# Patient Record
Sex: MALE | Race: WHITE | NOT HISPANIC OR LATINO | Employment: PART TIME | URBAN - METROPOLITAN AREA
[De-identification: names, ages, dates, MRNs, and addresses within clinical notes are randomized per-mention and may not be internally consistent; named-entity substitution may affect disease eponyms.]

---

## 2017-01-18 ENCOUNTER — APPOINTMENT (OUTPATIENT)
Dept: LAB | Facility: HOSPITAL | Age: 67
End: 2017-01-18
Attending: INTERNAL MEDICINE
Payer: COMMERCIAL

## 2017-01-18 DIAGNOSIS — R73.01 IMPAIRED FASTING GLUCOSE: ICD-10-CM

## 2017-01-18 LAB
ANION GAP SERPL CALCULATED.3IONS-SCNC: 7 MMOL/L (ref 4–13)
BUN SERPL-MCNC: 21 MG/DL (ref 5–25)
CALCIUM SERPL-MCNC: 9.3 MG/DL (ref 8.3–10.1)
CHLORIDE SERPL-SCNC: 101 MMOL/L (ref 100–108)
CO2 SERPL-SCNC: 30 MMOL/L (ref 21–32)
CREAT SERPL-MCNC: 0.91 MG/DL (ref 0.6–1.3)
GFR SERPL CREATININE-BSD FRML MDRD: >60 ML/MIN/1.73SQ M
GLUCOSE SERPL-MCNC: 128 MG/DL (ref 65–140)
POTASSIUM SERPL-SCNC: 4.1 MMOL/L (ref 3.5–5.3)
SODIUM SERPL-SCNC: 138 MMOL/L (ref 136–145)

## 2017-01-18 PROCEDURE — 36415 COLL VENOUS BLD VENIPUNCTURE: CPT

## 2017-01-18 PROCEDURE — 80048 BASIC METABOLIC PNL TOTAL CA: CPT

## 2017-01-20 ENCOUNTER — ALLSCRIPTS OFFICE VISIT (OUTPATIENT)
Dept: OTHER | Facility: OTHER | Age: 67
End: 2017-01-20

## 2017-02-07 ENCOUNTER — GENERIC CONVERSION - ENCOUNTER (OUTPATIENT)
Dept: OTHER | Facility: OTHER | Age: 67
End: 2017-02-07

## 2017-06-15 ENCOUNTER — GENERIC CONVERSION - ENCOUNTER (OUTPATIENT)
Dept: OTHER | Facility: OTHER | Age: 67
End: 2017-06-15

## 2017-06-20 ENCOUNTER — TRANSCRIBE ORDERS (OUTPATIENT)
Dept: ADMINISTRATIVE | Facility: HOSPITAL | Age: 67
End: 2017-06-20

## 2017-06-20 ENCOUNTER — APPOINTMENT (OUTPATIENT)
Dept: LAB | Facility: HOSPITAL | Age: 67
End: 2017-06-20
Payer: COMMERCIAL

## 2017-06-20 ENCOUNTER — APPOINTMENT (OUTPATIENT)
Dept: LAB | Facility: HOSPITAL | Age: 67
End: 2017-06-20
Attending: ORTHOPAEDIC SURGERY
Payer: COMMERCIAL

## 2017-06-20 ENCOUNTER — HOSPITAL ENCOUNTER (OUTPATIENT)
Dept: NON INVASIVE DIAGNOSTICS | Facility: HOSPITAL | Age: 67
Discharge: HOME | End: 2017-06-20
Attending: ORTHOPAEDIC SURGERY
Payer: COMMERCIAL

## 2017-06-20 ENCOUNTER — HOSPITAL ENCOUNTER (OUTPATIENT)
Dept: RADIOLOGY | Facility: HOSPITAL | Age: 67
Discharge: HOME | End: 2017-06-20
Attending: ORTHOPAEDIC SURGERY
Payer: COMMERCIAL

## 2017-06-20 ENCOUNTER — ANESTHESIA EVENT (OUTPATIENT)
Dept: PERIOP | Facility: HOSPITAL | Age: 67
DRG: 470 | End: 2017-06-20
Payer: COMMERCIAL

## 2017-06-20 ENCOUNTER — APPOINTMENT (OUTPATIENT)
Dept: PREADMISSION TESTING | Facility: HOSPITAL | Age: 67
End: 2017-06-20
Payer: COMMERCIAL

## 2017-06-20 VITALS
TEMPERATURE: 97 F | SYSTOLIC BLOOD PRESSURE: 160 MMHG | RESPIRATION RATE: 18 BRPM | BODY MASS INDEX: 34.66 KG/M2 | WEIGHT: 234 LBS | HEART RATE: 65 BPM | DIASTOLIC BLOOD PRESSURE: 80 MMHG | HEIGHT: 69 IN

## 2017-06-20 DIAGNOSIS — M17.12 OSTEOARTHRITIS OF LEFT KNEE, UNSPECIFIED OSTEOARTHRITIS TYPE: ICD-10-CM

## 2017-06-20 DIAGNOSIS — Z01.818 PREOP EXAMINATION: Primary | ICD-10-CM

## 2017-06-20 DIAGNOSIS — Z00.8 HEALTH EXAMINATION IN POPULATION SURVEY: ICD-10-CM

## 2017-06-20 DIAGNOSIS — Z01.818 PREOP EXAMINATION: ICD-10-CM

## 2017-06-20 DIAGNOSIS — Z00.8 HEALTH EXAMINATION IN POPULATION SURVEY: Primary | ICD-10-CM

## 2017-06-20 LAB
ALBUMIN SERPL BCP-MCNC: 4.2 G/DL (ref 3.5–5)
ALP SERPL-CCNC: 100 U/L (ref 46–116)
ALT SERPL W P-5'-P-CCNC: 29 U/L (ref 12–78)
ANION GAP SERPL CALCULATED.3IONS-SCNC: 9 MMOL/L (ref 4–13)
AST SERPL W P-5'-P-CCNC: 13 U/L (ref 5–45)
BASOPHILS # BLD AUTO: 0.03 THOUSANDS/ΜL (ref 0–0.1)
BASOPHILS NFR BLD AUTO: 1 % (ref 0–1)
BILIRUB SERPL-MCNC: 0.77 MG/DL (ref 0.2–1)
BILIRUB UR QL STRIP: NEGATIVE
BUN SERPL-MCNC: 22 MG/DL (ref 5–25)
CALCIUM SERPL-MCNC: 9.5 MG/DL (ref 8.3–10.1)
CHLORIDE SERPL-SCNC: 98 MMOL/L (ref 100–108)
CHOLEST SERPL-MCNC: 182 MG/DL (ref 50–200)
CLARITY UR: CLEAR
CO2 SERPL-SCNC: 28 MMOL/L (ref 21–32)
COLOR UR: YELLOW
CREAT SERPL-MCNC: 1.05 MG/DL (ref 0.6–1.3)
EOSINOPHIL # BLD AUTO: 0.08 THOUSAND/ΜL (ref 0–0.61)
EOSINOPHIL NFR BLD AUTO: 1 % (ref 0–6)
ERYTHROCYTE [DISTWIDTH] IN BLOOD BY AUTOMATED COUNT: 12.5 % (ref 11.6–15.1)
EST. AVERAGE GLUCOSE BLD GHB EST-MCNC: 131 MG/DL
GFR SERPL CREATININE-BSD FRML MDRD: >60 ML/MIN/1.73SQ M
GLUCOSE P FAST SERPL-MCNC: 132 MG/DL (ref 65–99)
GLUCOSE UR STRIP-MCNC: NEGATIVE MG/DL
HBA1C MFR BLD: 6.2 % (ref 4.2–6.3)
HCT VFR BLD AUTO: 40.7 % (ref 36.5–49.3)
HDLC SERPL-MCNC: 24 MG/DL (ref 40–60)
HGB BLD-MCNC: 14.2 G/DL (ref 12–17)
HGB UR QL STRIP.AUTO: NEGATIVE
INR PPP: 1.01 (ref 0.86–1.16)
KETONES UR STRIP-MCNC: NEGATIVE MG/DL
LEUKOCYTE ESTERASE UR QL STRIP: NEGATIVE
LYMPHOCYTES # BLD AUTO: 2.1 THOUSANDS/ΜL (ref 0.6–4.47)
LYMPHOCYTES NFR BLD AUTO: 32 % (ref 14–44)
MCH RBC QN AUTO: 32.1 PG (ref 26.8–34.3)
MCHC RBC AUTO-ENTMCNC: 34.9 G/DL (ref 31.4–37.4)
MCV RBC AUTO: 92 FL (ref 82–98)
MONOCYTES # BLD AUTO: 0.77 THOUSAND/ΜL (ref 0.17–1.22)
MONOCYTES NFR BLD AUTO: 12 % (ref 4–12)
NEUTROPHILS # BLD AUTO: 3.57 THOUSANDS/ΜL (ref 1.85–7.62)
NEUTS SEG NFR BLD AUTO: 54 % (ref 43–75)
NITRITE UR QL STRIP: NEGATIVE
PH UR STRIP.AUTO: 5.5 [PH] (ref 4.5–8)
PLATELET # BLD AUTO: 290 THOUSANDS/UL (ref 149–390)
PMV BLD AUTO: 11.4 FL (ref 8.9–12.7)
POTASSIUM SERPL-SCNC: 4.2 MMOL/L (ref 3.5–5.3)
PROT SERPL-MCNC: 7.9 G/DL (ref 6.4–8.2)
PROT UR STRIP-MCNC: NEGATIVE MG/DL
PROTHROMBIN TIME: 13.3 SECONDS (ref 12.1–14.4)
RBC # BLD AUTO: 4.42 MILLION/UL (ref 3.88–5.62)
SODIUM SERPL-SCNC: 135 MMOL/L (ref 136–145)
SP GR UR STRIP.AUTO: <=1.005 (ref 1–1.03)
TRIGL SERPL-MCNC: 479 MG/DL
UROBILINOGEN UR QL STRIP.AUTO: 0.2 E.U./DL
WBC # BLD AUTO: 6.55 THOUSAND/UL (ref 4.31–10.16)

## 2017-06-20 PROCEDURE — 93005 ELECTROCARDIOGRAM TRACING: CPT

## 2017-06-20 PROCEDURE — 85610 PROTHROMBIN TIME: CPT

## 2017-06-20 PROCEDURE — 83036 HEMOGLOBIN GLYCOSYLATED A1C: CPT

## 2017-06-20 PROCEDURE — 85025 COMPLETE CBC W/AUTO DIFF WBC: CPT

## 2017-06-20 PROCEDURE — 71020 HB CHEST X-RAY 2VW FRONTAL&LATL: CPT

## 2017-06-20 PROCEDURE — 36415 COLL VENOUS BLD VENIPUNCTURE: CPT

## 2017-06-20 PROCEDURE — 80053 COMPREHEN METABOLIC PANEL: CPT

## 2017-06-20 PROCEDURE — 80061 LIPID PANEL: CPT

## 2017-06-20 PROCEDURE — 81003 URINALYSIS AUTO W/O SCOPE: CPT | Performed by: ORTHOPAEDIC SURGERY

## 2017-06-20 RX ORDER — MULTIVITAMIN
1 TABLET ORAL DAILY
COMMUNITY
End: 2018-04-17 | Stop reason: SDUPTHER

## 2017-06-20 RX ORDER — AMLODIPINE BESYLATE 10 MG/1
10 TABLET ORAL DAILY
COMMUNITY
End: 2017-06-22

## 2017-06-20 RX ORDER — HYDROCHLOROTHIAZIDE 25 MG/1
25 TABLET ORAL DAILY
COMMUNITY
End: 2018-08-21 | Stop reason: SDUPTHER

## 2017-06-20 RX ORDER — ACETAMINOPHEN 500 MG
500-1000 TABLET ORAL EVERY 6 HOURS PRN
COMMUNITY
End: 2018-04-17 | Stop reason: ALTCHOICE

## 2017-06-20 RX ORDER — SPIRONOLACTONE 25 MG/1
25 TABLET ORAL DAILY
COMMUNITY
End: 2018-06-01 | Stop reason: SDUPTHER

## 2017-06-20 RX ORDER — METOPROLOL TARTRATE 50 MG/1
50 TABLET, FILM COATED ORAL DAILY
COMMUNITY
End: 2017-06-22

## 2017-06-20 RX ORDER — LOSARTAN POTASSIUM 100 MG/1
100 TABLET ORAL DAILY
COMMUNITY
End: 2018-07-26 | Stop reason: SDUPTHER

## 2017-06-21 LAB
ATRIAL RATE: 55 BPM
P AXIS: 8 DEGREES
PR INTERVAL: 142 MS
QRS AXIS: 9 DEGREES
QRSD INTERVAL: 96 MS
QT INTERVAL: 402 MS
QTC INTERVAL: 384 MS
T WAVE AXIS: 20 DEGREES
VENTRICULAR RATE: 55 BPM

## 2017-06-22 RX ORDER — LATANOPROST 50 UG/ML
1 SOLUTION/ DROPS OPHTHALMIC
COMMUNITY
End: 2021-10-19

## 2017-06-22 RX ORDER — AMLODIPINE BESYLATE 5 MG/1
5 TABLET ORAL DAILY
COMMUNITY
End: 2018-07-26 | Stop reason: SDUPTHER

## 2017-06-22 RX ORDER — METOPROLOL SUCCINATE 50 MG/1
50 TABLET, EXTENDED RELEASE ORAL DAILY
COMMUNITY
End: 2019-02-04 | Stop reason: SDUPTHER

## 2017-07-03 ENCOUNTER — ALLSCRIPTS OFFICE VISIT (OUTPATIENT)
Dept: OTHER | Facility: OTHER | Age: 67
End: 2017-07-03

## 2017-07-13 RX ORDER — TRANEXAMIC ACID 100 MG/ML
1000 INJECTION, SOLUTION INTRAVENOUS ONCE
Status: CANCELLED | OUTPATIENT
Start: 2017-07-14 | End: 2017-07-13

## 2017-07-14 ENCOUNTER — APPOINTMENT (INPATIENT)
Dept: RADIOLOGY | Facility: HOSPITAL | Age: 67
DRG: 470 | End: 2017-07-14
Payer: COMMERCIAL

## 2017-07-14 ENCOUNTER — ANESTHESIA (OUTPATIENT)
Dept: PERIOP | Facility: HOSPITAL | Age: 67
DRG: 470 | End: 2017-07-14
Payer: COMMERCIAL

## 2017-07-14 ENCOUNTER — HOSPITAL ENCOUNTER (INPATIENT)
Facility: HOSPITAL | Age: 67
LOS: 2 days | Discharge: HOME HEALTH | DRG: 470 | End: 2017-07-16
Attending: ORTHOPAEDIC SURGERY | Admitting: ORTHOPAEDIC SURGERY
Payer: COMMERCIAL

## 2017-07-14 DIAGNOSIS — M17.12 PRIMARY OSTEOARTHRITIS OF LEFT KNEE: Primary | ICD-10-CM

## 2017-07-14 LAB
ABO GROUP BLD: NORMAL
BLD GP AB SCN SERPL QL: NEGATIVE
RH BLD: POSITIVE
SPECIMEN EXPIRATION DATE: NORMAL

## 2017-07-14 PROCEDURE — C1776 JOINT DEVICE (IMPLANTABLE): HCPCS | Performed by: ORTHOPAEDIC SURGERY

## 2017-07-14 PROCEDURE — C1713 ANCHOR/SCREW BN/BN,TIS/BN: HCPCS | Performed by: ORTHOPAEDIC SURGERY

## 2017-07-14 PROCEDURE — 0SRD0J9 REPLACEMENT OF LEFT KNEE JOINT WITH SYNTHETIC SUBSTITUTE, CEMENTED, OPEN APPROACH: ICD-10-PCS | Performed by: ORTHOPAEDIC SURGERY

## 2017-07-14 PROCEDURE — 86850 RBC ANTIBODY SCREEN: CPT | Performed by: ORTHOPAEDIC SURGERY

## 2017-07-14 PROCEDURE — 86901 BLOOD TYPING SEROLOGIC RH(D): CPT | Performed by: ORTHOPAEDIC SURGERY

## 2017-07-14 PROCEDURE — 86900 BLOOD TYPING SEROLOGIC ABO: CPT | Performed by: ORTHOPAEDIC SURGERY

## 2017-07-14 PROCEDURE — 73560 X-RAY EXAM OF KNEE 1 OR 2: CPT

## 2017-07-14 PROCEDURE — 94762 N-INVAS EAR/PLS OXIMTRY CONT: CPT

## 2017-07-14 DEVICE — ATTUNE KNEE SYSTEM TIBIAL BASE FIXED BEARING SIZE 6 CEMENTED
Type: IMPLANTABLE DEVICE | Site: KNEE | Status: FUNCTIONAL
Brand: ATTUNE

## 2017-07-14 DEVICE — ATTUNE KNEE SYSTEM FEMORAL POSTERIOR STABILIZED SIZE 6 LEFT CEMENTED
Type: IMPLANTABLE DEVICE | Site: KNEE | Status: FUNCTIONAL
Brand: ATTUNE

## 2017-07-14 DEVICE — ATTUNE KNEE SYSTEM TIBIAL INSERT FIXED BEARING POSTERIOR STABILIZED 6 6MM AOX
Type: IMPLANTABLE DEVICE | Site: KNEE | Status: FUNCTIONAL
Brand: ATTUNE

## 2017-07-14 DEVICE — SMARTSET GMV HIGH PERFORMANCE GENTAMICIN MEDIUM VISCOSITY BONE CEMENT 40G
Type: IMPLANTABLE DEVICE | Site: KNEE | Status: FUNCTIONAL
Brand: SMARTSET

## 2017-07-14 DEVICE — ATTUNE PATELLA MEDIALIZED ANATOMIC 35MM CEMENTED AOX
Type: IMPLANTABLE DEVICE | Site: PATELLA | Status: FUNCTIONAL
Brand: ATTUNE

## 2017-07-14 RX ORDER — OXYCODONE HYDROCHLORIDE 10 MG/1
10 TABLET ORAL EVERY 4 HOURS PRN
Status: DISCONTINUED | OUTPATIENT
Start: 2017-07-15 | End: 2017-07-16 | Stop reason: HOSPADM

## 2017-07-14 RX ORDER — METOPROLOL SUCCINATE 50 MG/1
50 TABLET, EXTENDED RELEASE ORAL DAILY
Status: DISCONTINUED | OUTPATIENT
Start: 2017-07-15 | End: 2017-07-16 | Stop reason: HOSPADM

## 2017-07-14 RX ORDER — PROPOFOL 10 MG/ML
INJECTION, EMULSION INTRAVENOUS CONTINUOUS PRN
Status: DISCONTINUED | OUTPATIENT
Start: 2017-07-14 | End: 2017-07-14 | Stop reason: SURG

## 2017-07-14 RX ORDER — ONDANSETRON 2 MG/ML
4 INJECTION INTRAMUSCULAR; INTRAVENOUS EVERY 4 HOURS PRN
Status: DISPENSED | OUTPATIENT
Start: 2017-07-14 | End: 2017-07-15

## 2017-07-14 RX ORDER — MIDAZOLAM HYDROCHLORIDE 1 MG/ML
INJECTION INTRAMUSCULAR; INTRAVENOUS AS NEEDED
Status: DISCONTINUED | OUTPATIENT
Start: 2017-07-14 | End: 2017-07-14 | Stop reason: SURG

## 2017-07-14 RX ORDER — LATANOPROST 50 UG/ML
1 SOLUTION/ DROPS OPHTHALMIC
Status: DISCONTINUED | OUTPATIENT
Start: 2017-07-14 | End: 2017-07-16 | Stop reason: HOSPADM

## 2017-07-14 RX ORDER — METOCLOPRAMIDE HYDROCHLORIDE 5 MG/ML
5 INJECTION INTRAMUSCULAR; INTRAVENOUS EVERY 6 HOURS PRN
Status: ACTIVE | OUTPATIENT
Start: 2017-07-14 | End: 2017-07-15

## 2017-07-14 RX ORDER — TAMSULOSIN HYDROCHLORIDE 0.4 MG/1
0.4 CAPSULE ORAL
Status: DISCONTINUED | OUTPATIENT
Start: 2017-07-14 | End: 2017-07-16 | Stop reason: HOSPADM

## 2017-07-14 RX ORDER — ROPIVACAINE HYDROCHLORIDE 5 MG/ML
INJECTION, SOLUTION EPIDURAL; INFILTRATION; PERINEURAL AS NEEDED
Status: DISCONTINUED | OUTPATIENT
Start: 2017-07-14 | End: 2017-07-14 | Stop reason: SURG

## 2017-07-14 RX ORDER — SODIUM CHLORIDE 9 MG/ML
125 INJECTION, SOLUTION INTRAVENOUS CONTINUOUS
Status: DISCONTINUED | OUTPATIENT
Start: 2017-07-14 | End: 2017-07-16 | Stop reason: HOSPADM

## 2017-07-14 RX ORDER — BUPIVACAINE HYDROCHLORIDE AND EPINEPHRINE 5; 5 MG/ML; UG/ML
INJECTION, SOLUTION PERINEURAL AS NEEDED
Status: DISCONTINUED | OUTPATIENT
Start: 2017-07-14 | End: 2017-07-14 | Stop reason: HOSPADM

## 2017-07-14 RX ORDER — EPHEDRINE SULFATE 50 MG/ML
INJECTION, SOLUTION INTRAVENOUS AS NEEDED
Status: DISCONTINUED | OUTPATIENT
Start: 2017-07-14 | End: 2017-07-14 | Stop reason: SURG

## 2017-07-14 RX ORDER — FENTANYL CITRATE 50 UG/ML
INJECTION, SOLUTION INTRAMUSCULAR; INTRAVENOUS AS NEEDED
Status: DISCONTINUED | OUTPATIENT
Start: 2017-07-14 | End: 2017-07-14 | Stop reason: SURG

## 2017-07-14 RX ORDER — SODIUM CHLORIDE, SODIUM LACTATE, POTASSIUM CHLORIDE, CALCIUM CHLORIDE 600; 310; 30; 20 MG/100ML; MG/100ML; MG/100ML; MG/100ML
100 INJECTION, SOLUTION INTRAVENOUS CONTINUOUS
Status: DISCONTINUED | OUTPATIENT
Start: 2017-07-14 | End: 2017-07-16 | Stop reason: HOSPADM

## 2017-07-14 RX ORDER — NALBUPHINE HCL 10 MG/ML
3 AMPUL (ML) INJECTION
Status: DISCONTINUED | OUTPATIENT
Start: 2017-07-14 | End: 2017-07-14

## 2017-07-14 RX ORDER — KETOROLAC TROMETHAMINE 30 MG/ML
15 INJECTION, SOLUTION INTRAMUSCULAR; INTRAVENOUS EVERY 6 HOURS PRN
Status: DISCONTINUED | OUTPATIENT
Start: 2017-07-14 | End: 2017-07-16 | Stop reason: HOSPADM

## 2017-07-14 RX ORDER — ZOLPIDEM TARTRATE 5 MG/1
5 TABLET ORAL
Status: DISCONTINUED | OUTPATIENT
Start: 2017-07-15 | End: 2017-07-16 | Stop reason: HOSPADM

## 2017-07-14 RX ORDER — MORPHINE SULFATE 1 MG/ML
INJECTION, SOLUTION EPIDURAL; INTRATHECAL; INTRAVENOUS AS NEEDED
Status: DISCONTINUED | OUTPATIENT
Start: 2017-07-14 | End: 2017-07-14 | Stop reason: SURG

## 2017-07-14 RX ORDER — DOCUSATE SODIUM 100 MG/1
100 CAPSULE, LIQUID FILLED ORAL 2 TIMES DAILY
Status: DISCONTINUED | OUTPATIENT
Start: 2017-07-14 | End: 2017-07-16 | Stop reason: HOSPADM

## 2017-07-14 RX ORDER — DIPHENHYDRAMINE HYDROCHLORIDE 50 MG/ML
50 INJECTION INTRAMUSCULAR; INTRAVENOUS EVERY 6 HOURS PRN
Status: DISCONTINUED | OUTPATIENT
Start: 2017-07-15 | End: 2017-07-16 | Stop reason: HOSPADM

## 2017-07-14 RX ORDER — ASPIRIN 325 MG
325 TABLET ORAL 2 TIMES DAILY
Status: DISCONTINUED | OUTPATIENT
Start: 2017-07-14 | End: 2017-07-16 | Stop reason: HOSPADM

## 2017-07-14 RX ORDER — ALBUTEROL SULFATE 2.5 MG/3ML
2.5 SOLUTION RESPIRATORY (INHALATION) ONCE AS NEEDED
Status: DISCONTINUED | OUTPATIENT
Start: 2017-07-14 | End: 2017-07-14 | Stop reason: HOSPADM

## 2017-07-14 RX ORDER — TAMSULOSIN HYDROCHLORIDE 0.4 MG/1
0.4 CAPSULE ORAL
COMMUNITY
End: 2018-04-03 | Stop reason: SDUPTHER

## 2017-07-14 RX ORDER — NALOXONE HYDROCHLORIDE 0.4 MG/ML
0.1 INJECTION, SOLUTION INTRAMUSCULAR; INTRAVENOUS; SUBCUTANEOUS
Status: ACTIVE | OUTPATIENT
Start: 2017-07-14 | End: 2017-07-15

## 2017-07-14 RX ORDER — ONDANSETRON 2 MG/ML
4 INJECTION INTRAMUSCULAR; INTRAVENOUS ONCE AS NEEDED
Status: DISCONTINUED | OUTPATIENT
Start: 2017-07-14 | End: 2017-07-14 | Stop reason: HOSPADM

## 2017-07-14 RX ORDER — BUPIVACAINE HYDROCHLORIDE 7.5 MG/ML
INJECTION, SOLUTION INTRASPINAL AS NEEDED
Status: DISCONTINUED | OUTPATIENT
Start: 2017-07-14 | End: 2017-07-14 | Stop reason: SURG

## 2017-07-14 RX ORDER — ACETAMINOPHEN 325 MG/1
650 TABLET ORAL EVERY 6 HOURS PRN
Status: DISCONTINUED | OUTPATIENT
Start: 2017-07-14 | End: 2017-07-16 | Stop reason: HOSPADM

## 2017-07-14 RX ORDER — NALOXONE HYDROCHLORIDE 0.4 MG/ML
0.04 INJECTION, SOLUTION INTRAMUSCULAR; INTRAVENOUS; SUBCUTANEOUS AS NEEDED
Status: DISCONTINUED | OUTPATIENT
Start: 2017-07-15 | End: 2017-07-16 | Stop reason: HOSPADM

## 2017-07-14 RX ORDER — CALCIUM CARBONATE 200(500)MG
1000 TABLET,CHEWABLE ORAL DAILY PRN
Status: DISCONTINUED | OUTPATIENT
Start: 2017-07-14 | End: 2017-07-16 | Stop reason: HOSPADM

## 2017-07-14 RX ORDER — HYDROCHLOROTHIAZIDE 25 MG/1
25 TABLET ORAL DAILY
Status: DISCONTINUED | OUTPATIENT
Start: 2017-07-15 | End: 2017-07-16 | Stop reason: HOSPADM

## 2017-07-14 RX ORDER — MAGNESIUM HYDROXIDE 1200 MG/15ML
LIQUID ORAL AS NEEDED
Status: DISCONTINUED | OUTPATIENT
Start: 2017-07-14 | End: 2017-07-14 | Stop reason: HOSPADM

## 2017-07-14 RX ORDER — AMLODIPINE BESYLATE 5 MG/1
5 TABLET ORAL DAILY
Status: DISCONTINUED | OUTPATIENT
Start: 2017-07-15 | End: 2017-07-16 | Stop reason: HOSPADM

## 2017-07-14 RX ORDER — MORPHINE SULFATE 2 MG/ML
2 INJECTION, SOLUTION INTRAMUSCULAR; INTRAVENOUS EVERY 2 HOUR PRN
Status: DISCONTINUED | OUTPATIENT
Start: 2017-07-15 | End: 2017-07-16 | Stop reason: HOSPADM

## 2017-07-14 RX ORDER — TRAMADOL HYDROCHLORIDE 50 MG/1
50 TABLET ORAL EVERY 6 HOURS PRN
Status: DISCONTINUED | OUTPATIENT
Start: 2017-07-15 | End: 2017-07-16 | Stop reason: HOSPADM

## 2017-07-14 RX ORDER — TRANEXAMIC ACID 100 MG/ML
INJECTION, SOLUTION INTRAVENOUS AS NEEDED
Status: DISCONTINUED | OUTPATIENT
Start: 2017-07-14 | End: 2017-07-14 | Stop reason: HOSPADM

## 2017-07-14 RX ORDER — SPIRONOLACTONE 25 MG/1
25 TABLET ORAL DAILY
Status: DISCONTINUED | OUTPATIENT
Start: 2017-07-15 | End: 2017-07-16 | Stop reason: HOSPADM

## 2017-07-14 RX ORDER — METHOCARBAMOL 500 MG/1
500 TABLET, FILM COATED ORAL EVERY 6 HOURS PRN
Status: DISCONTINUED | OUTPATIENT
Start: 2017-07-14 | End: 2017-07-16 | Stop reason: HOSPADM

## 2017-07-14 RX ORDER — HYDROMORPHONE HCL 110MG/55ML
0.5 PATIENT CONTROLLED ANALGESIA SYRINGE INTRAVENOUS EVERY 4 HOURS PRN
Status: DISCONTINUED | OUTPATIENT
Start: 2017-07-14 | End: 2017-07-16 | Stop reason: HOSPADM

## 2017-07-14 RX ORDER — NALBUPHINE HCL 10 MG/ML
1 AMPUL (ML) INJECTION
Status: DISCONTINUED | OUTPATIENT
Start: 2017-07-14 | End: 2017-07-16 | Stop reason: HOSPADM

## 2017-07-14 RX ORDER — FENTANYL CITRATE/PF 50 MCG/ML
50 SYRINGE (ML) INJECTION
Status: DISCONTINUED | OUTPATIENT
Start: 2017-07-14 | End: 2017-07-14 | Stop reason: HOSPADM

## 2017-07-14 RX ORDER — ONDANSETRON 2 MG/ML
4 INJECTION INTRAMUSCULAR; INTRAVENOUS EVERY 8 HOURS PRN
Status: DISCONTINUED | OUTPATIENT
Start: 2017-07-14 | End: 2017-07-16 | Stop reason: HOSPADM

## 2017-07-14 RX ORDER — OXYCODONE HYDROCHLORIDE 5 MG/1
5 TABLET ORAL EVERY 4 HOURS PRN
Status: DISCONTINUED | OUTPATIENT
Start: 2017-07-15 | End: 2017-07-16 | Stop reason: HOSPADM

## 2017-07-14 RX ORDER — LOSARTAN POTASSIUM 50 MG/1
100 TABLET ORAL DAILY
Status: DISCONTINUED | OUTPATIENT
Start: 2017-07-15 | End: 2017-07-16 | Stop reason: HOSPADM

## 2017-07-14 RX ORDER — BISACODYL 10 MG
10 SUPPOSITORY, RECTAL RECTAL DAILY PRN
Status: DISCONTINUED | OUTPATIENT
Start: 2017-07-14 | End: 2017-07-16 | Stop reason: HOSPADM

## 2017-07-14 RX ADMIN — EPHEDRINE SULFATE 5 MG: 50 INJECTION, SOLUTION INTRAMUSCULAR; INTRAVENOUS; SUBCUTANEOUS at 11:54

## 2017-07-14 RX ADMIN — EPHEDRINE SULFATE 10 MG: 50 INJECTION, SOLUTION INTRAMUSCULAR; INTRAVENOUS; SUBCUTANEOUS at 12:00

## 2017-07-14 RX ADMIN — EPHEDRINE SULFATE 15 MG: 50 INJECTION, SOLUTION INTRAMUSCULAR; INTRAVENOUS; SUBCUTANEOUS at 12:13

## 2017-07-14 RX ADMIN — BUPIVACAINE HYDROCHLORIDE IN DEXTROSE 1.6 ML: 7.5 INJECTION, SOLUTION SUBARACHNOID at 11:10

## 2017-07-14 RX ADMIN — ONDANSETRON 4 MG: 2 INJECTION INTRAMUSCULAR; INTRAVENOUS at 18:02

## 2017-07-14 RX ADMIN — SODIUM CHLORIDE 125 ML/HR: 0.9 INJECTION, SOLUTION INTRAVENOUS at 08:41

## 2017-07-14 RX ADMIN — DOCUSATE SODIUM 100 MG: 100 CAPSULE, LIQUID FILLED ORAL at 18:02

## 2017-07-14 RX ADMIN — HYDROMORPHONE HYDROCHLORIDE 0.5 MG: 2 INJECTION, SOLUTION INTRAMUSCULAR; INTRAVENOUS; SUBCUTANEOUS at 23:25

## 2017-07-14 RX ADMIN — ASPIRIN 325 MG: 325 TABLET ORAL at 18:02

## 2017-07-14 RX ADMIN — CEFAZOLIN SODIUM 2000 MG: 2 SOLUTION INTRAVENOUS at 10:57

## 2017-07-14 RX ADMIN — FENTANYL CITRATE 100 MCG: 50 INJECTION, SOLUTION INTRAMUSCULAR; INTRAVENOUS at 10:35

## 2017-07-14 RX ADMIN — CEFAZOLIN SODIUM 1000 MG: 1 SOLUTION INTRAVENOUS at 20:06

## 2017-07-14 RX ADMIN — LATANOPROST 1 DROP: 50 SOLUTION OPHTHALMIC at 23:25

## 2017-07-14 RX ADMIN — MORPHINE SULFATE 0.1 MG: 1 INJECTION, SOLUTION EPIDURAL; INTRATHECAL; INTRAVENOUS at 11:10

## 2017-07-14 RX ADMIN — PROPOFOL 100 MCG/KG/MIN: 10 INJECTION, EMULSION INTRAVENOUS at 11:10

## 2017-07-14 RX ADMIN — NALBUPHINE HYDROCHLORIDE 1 MG: 10 INJECTION, SOLUTION INTRAMUSCULAR; INTRAVENOUS; SUBCUTANEOUS at 21:00

## 2017-07-14 RX ADMIN — MIDAZOLAM HYDROCHLORIDE 2 MG: 1 INJECTION, SOLUTION INTRAMUSCULAR; INTRAVENOUS at 10:35

## 2017-07-14 RX ADMIN — ROPIVACAINE HYDROCHLORIDE 30 ML: 5 INJECTION, SOLUTION EPIDURAL; INFILTRATION; PERINEURAL at 10:38

## 2017-07-14 RX ADMIN — SODIUM CHLORIDE, SODIUM LACTATE, POTASSIUM CHLORIDE, AND CALCIUM CHLORIDE 100 ML/HR: .6; .31; .03; .02 INJECTION, SOLUTION INTRAVENOUS at 16:21

## 2017-07-14 RX ADMIN — MIDAZOLAM HYDROCHLORIDE 2 MG: 1 INJECTION, SOLUTION INTRAMUSCULAR; INTRAVENOUS at 11:07

## 2017-07-14 RX ADMIN — TAMSULOSIN HYDROCHLORIDE 0.4 MG: 0.4 CAPSULE ORAL at 20:06

## 2017-07-14 RX ADMIN — TRANEXAMIC ACID 1 G: 100 INJECTION, SOLUTION INTRAVENOUS at 11:11

## 2017-07-15 ENCOUNTER — APPOINTMENT (INPATIENT)
Dept: PHYSICAL THERAPY | Facility: HOSPITAL | Age: 67
DRG: 470 | End: 2017-07-15
Payer: COMMERCIAL

## 2017-07-15 LAB
ERYTHROCYTE [DISTWIDTH] IN BLOOD BY AUTOMATED COUNT: 12.8 % (ref 11.6–15.1)
HCT VFR BLD AUTO: 30.7 % (ref 36.5–49.3)
HGB BLD-MCNC: 10.4 G/DL (ref 12–17)
MCH RBC QN AUTO: 32 PG (ref 26.8–34.3)
MCHC RBC AUTO-ENTMCNC: 33.9 G/DL (ref 31.4–37.4)
MCV RBC AUTO: 95 FL (ref 82–98)
PLATELET # BLD AUTO: 230 THOUSANDS/UL (ref 149–390)
PMV BLD AUTO: 10.7 FL (ref 8.9–12.7)
RBC # BLD AUTO: 3.25 MILLION/UL (ref 3.88–5.62)
WBC # BLD AUTO: 10.73 THOUSAND/UL (ref 4.31–10.16)

## 2017-07-15 PROCEDURE — 97530 THERAPEUTIC ACTIVITIES: CPT

## 2017-07-15 PROCEDURE — 97116 GAIT TRAINING THERAPY: CPT

## 2017-07-15 PROCEDURE — G8979 MOBILITY GOAL STATUS: HCPCS

## 2017-07-15 PROCEDURE — G8978 MOBILITY CURRENT STATUS: HCPCS

## 2017-07-15 PROCEDURE — 97162 PT EVAL MOD COMPLEX 30 MIN: CPT

## 2017-07-15 PROCEDURE — 85027 COMPLETE CBC AUTOMATED: CPT | Performed by: PHYSICIAN ASSISTANT

## 2017-07-15 RX ADMIN — OXYCODONE HYDROCHLORIDE 5 MG: 5 TABLET ORAL at 14:55

## 2017-07-15 RX ADMIN — AMLODIPINE BESYLATE 5 MG: 5 TABLET ORAL at 08:40

## 2017-07-15 RX ADMIN — LATANOPROST 1 DROP: 50 SOLUTION OPHTHALMIC at 22:36

## 2017-07-15 RX ADMIN — KETOROLAC TROMETHAMINE 15 MG: 30 INJECTION, SOLUTION INTRAMUSCULAR at 09:56

## 2017-07-15 RX ADMIN — DOCUSATE SODIUM 100 MG: 100 CAPSULE, LIQUID FILLED ORAL at 17:59

## 2017-07-15 RX ADMIN — SPIRONOLACTONE 25 MG: 25 TABLET, FILM COATED ORAL at 08:39

## 2017-07-15 RX ADMIN — CEFAZOLIN SODIUM 1000 MG: 1 SOLUTION INTRAVENOUS at 03:09

## 2017-07-15 RX ADMIN — HYDROCHLOROTHIAZIDE 25 MG: 25 TABLET ORAL at 08:39

## 2017-07-15 RX ADMIN — DOCUSATE SODIUM 100 MG: 100 CAPSULE, LIQUID FILLED ORAL at 08:40

## 2017-07-15 RX ADMIN — ASPIRIN 325 MG: 325 TABLET ORAL at 08:39

## 2017-07-15 RX ADMIN — METOPROLOL SUCCINATE 50 MG: 50 TABLET, EXTENDED RELEASE ORAL at 08:40

## 2017-07-15 RX ADMIN — OXYCODONE HYDROCHLORIDE 5 MG: 5 TABLET ORAL at 22:55

## 2017-07-15 RX ADMIN — SODIUM CHLORIDE, SODIUM LACTATE, POTASSIUM CHLORIDE, AND CALCIUM CHLORIDE 100 ML/HR: .6; .31; .03; .02 INJECTION, SOLUTION INTRAVENOUS at 03:09

## 2017-07-15 RX ADMIN — OXYCODONE HYDROCHLORIDE 5 MG: 5 TABLET ORAL at 18:53

## 2017-07-15 RX ADMIN — Medication 1 TABLET: at 08:40

## 2017-07-15 RX ADMIN — NALBUPHINE HYDROCHLORIDE 1 MG: 10 INJECTION, SOLUTION INTRAMUSCULAR; INTRAVENOUS; SUBCUTANEOUS at 03:22

## 2017-07-15 RX ADMIN — ASPIRIN 325 MG: 325 TABLET ORAL at 17:25

## 2017-07-15 RX ADMIN — HYDROMORPHONE HYDROCHLORIDE 0.5 MG: 2 INJECTION, SOLUTION INTRAMUSCULAR; INTRAVENOUS; SUBCUTANEOUS at 06:45

## 2017-07-15 RX ADMIN — LOSARTAN POTASSIUM 100 MG: 50 TABLET, FILM COATED ORAL at 08:39

## 2017-07-15 RX ADMIN — TAMSULOSIN HYDROCHLORIDE 0.4 MG: 0.4 CAPSULE ORAL at 17:25

## 2017-07-16 VITALS
WEIGHT: 234 LBS | DIASTOLIC BLOOD PRESSURE: 69 MMHG | HEIGHT: 69 IN | RESPIRATION RATE: 16 BRPM | TEMPERATURE: 98.9 F | BODY MASS INDEX: 34.66 KG/M2 | SYSTOLIC BLOOD PRESSURE: 139 MMHG | HEART RATE: 73 BPM | OXYGEN SATURATION: 94 %

## 2017-07-16 LAB
ANION GAP SERPL CALCULATED.3IONS-SCNC: 7 MMOL/L (ref 4–13)
BUN SERPL-MCNC: 15 MG/DL (ref 5–25)
CALCIUM SERPL-MCNC: 8.5 MG/DL (ref 8.3–10.1)
CHLORIDE SERPL-SCNC: 102 MMOL/L (ref 100–108)
CO2 SERPL-SCNC: 29 MMOL/L (ref 21–32)
CREAT SERPL-MCNC: 1.05 MG/DL (ref 0.6–1.3)
GFR SERPL CREATININE-BSD FRML MDRD: >60 ML/MIN/1.73SQ M
GLUCOSE SERPL-MCNC: 132 MG/DL (ref 65–140)
POTASSIUM SERPL-SCNC: 3.8 MMOL/L (ref 3.5–5.3)
SODIUM SERPL-SCNC: 138 MMOL/L (ref 136–145)

## 2017-07-16 PROCEDURE — 97110 THERAPEUTIC EXERCISES: CPT

## 2017-07-16 PROCEDURE — 80048 BASIC METABOLIC PNL TOTAL CA: CPT | Performed by: INTERNAL MEDICINE

## 2017-07-16 PROCEDURE — 97116 GAIT TRAINING THERAPY: CPT

## 2017-07-16 PROCEDURE — 97530 THERAPEUTIC ACTIVITIES: CPT

## 2017-07-16 RX ADMIN — AMLODIPINE BESYLATE 5 MG: 5 TABLET ORAL at 08:05

## 2017-07-16 RX ADMIN — KETOROLAC TROMETHAMINE 15 MG: 30 INJECTION, SOLUTION INTRAMUSCULAR at 00:20

## 2017-07-16 RX ADMIN — LOSARTAN POTASSIUM 100 MG: 50 TABLET, FILM COATED ORAL at 08:04

## 2017-07-16 RX ADMIN — TRAMADOL HYDROCHLORIDE 50 MG: 50 TABLET, COATED ORAL at 08:05

## 2017-07-16 RX ADMIN — ASPIRIN 325 MG: 325 TABLET ORAL at 08:04

## 2017-07-16 RX ADMIN — SPIRONOLACTONE 25 MG: 25 TABLET, FILM COATED ORAL at 08:05

## 2017-07-16 RX ADMIN — HYDROCHLOROTHIAZIDE 25 MG: 25 TABLET ORAL at 08:05

## 2017-07-16 RX ADMIN — OXYCODONE HYDROCHLORIDE 10 MG: 10 TABLET ORAL at 09:57

## 2017-07-16 RX ADMIN — OXYCODONE HYDROCHLORIDE 5 MG: 5 TABLET ORAL at 06:00

## 2017-07-16 RX ADMIN — TRAMADOL HYDROCHLORIDE 50 MG: 50 TABLET, COATED ORAL at 14:13

## 2017-07-16 RX ADMIN — DOCUSATE SODIUM 100 MG: 100 CAPSULE, LIQUID FILLED ORAL at 08:05

## 2017-07-16 RX ADMIN — Medication 1 TABLET: at 08:05

## 2017-07-16 RX ADMIN — METOPROLOL SUCCINATE 50 MG: 50 TABLET, EXTENDED RELEASE ORAL at 08:05

## 2017-07-20 ENCOUNTER — GENERIC CONVERSION - ENCOUNTER (OUTPATIENT)
Dept: OTHER | Facility: OTHER | Age: 67
End: 2017-07-20

## 2017-08-11 ENCOUNTER — APPOINTMENT (OUTPATIENT)
Dept: PHYSICAL THERAPY | Facility: MEDICAL CENTER | Age: 67
End: 2017-08-11
Payer: COMMERCIAL

## 2017-08-11 PROCEDURE — G8991 OTHER PT/OT GOAL STATUS: HCPCS

## 2017-08-11 PROCEDURE — 97161 PT EVAL LOW COMPLEX 20 MIN: CPT

## 2017-08-11 PROCEDURE — 97140 MANUAL THERAPY 1/> REGIONS: CPT

## 2017-08-11 PROCEDURE — G8990 OTHER PT/OT CURRENT STATUS: HCPCS

## 2017-08-14 ENCOUNTER — APPOINTMENT (OUTPATIENT)
Dept: PHYSICAL THERAPY | Facility: MEDICAL CENTER | Age: 67
End: 2017-08-14
Payer: COMMERCIAL

## 2017-08-14 PROCEDURE — 97110 THERAPEUTIC EXERCISES: CPT

## 2017-08-14 PROCEDURE — 97140 MANUAL THERAPY 1/> REGIONS: CPT

## 2017-08-16 ENCOUNTER — APPOINTMENT (OUTPATIENT)
Dept: PHYSICAL THERAPY | Facility: MEDICAL CENTER | Age: 67
End: 2017-08-16
Payer: COMMERCIAL

## 2017-08-16 PROCEDURE — 97140 MANUAL THERAPY 1/> REGIONS: CPT

## 2017-08-16 PROCEDURE — 97110 THERAPEUTIC EXERCISES: CPT

## 2017-08-16 PROCEDURE — 97014 ELECTRIC STIMULATION THERAPY: CPT

## 2017-08-17 ENCOUNTER — ANESTHESIA EVENT (OUTPATIENT)
Dept: PERIOP | Facility: HOSPITAL | Age: 67
DRG: 470 | End: 2017-08-17
Payer: COMMERCIAL

## 2017-08-17 RX ORDER — SODIUM CHLORIDE 9 MG/ML
125 INJECTION, SOLUTION INTRAVENOUS CONTINUOUS
Status: CANCELLED | OUTPATIENT
Start: 2017-09-05

## 2017-08-18 ENCOUNTER — APPOINTMENT (OUTPATIENT)
Dept: PREADMISSION TESTING | Facility: HOSPITAL | Age: 67
End: 2017-08-18
Payer: COMMERCIAL

## 2017-08-18 ENCOUNTER — APPOINTMENT (OUTPATIENT)
Dept: LAB | Facility: HOSPITAL | Age: 67
End: 2017-08-18
Attending: ORTHOPAEDIC SURGERY
Payer: COMMERCIAL

## 2017-08-18 ENCOUNTER — TRANSCRIBE ORDERS (OUTPATIENT)
Dept: ADMINISTRATIVE | Facility: HOSPITAL | Age: 67
End: 2017-08-18

## 2017-08-18 ENCOUNTER — APPOINTMENT (OUTPATIENT)
Dept: PHYSICAL THERAPY | Facility: MEDICAL CENTER | Age: 67
End: 2017-08-18
Payer: COMMERCIAL

## 2017-08-18 VITALS
HEART RATE: 86 BPM | SYSTOLIC BLOOD PRESSURE: 126 MMHG | TEMPERATURE: 96.5 F | BODY MASS INDEX: 35.07 KG/M2 | HEIGHT: 69 IN | WEIGHT: 236.8 LBS | DIASTOLIC BLOOD PRESSURE: 76 MMHG | RESPIRATION RATE: 18 BRPM

## 2017-08-18 DIAGNOSIS — Z01.818 PREOP EXAMINATION: Primary | ICD-10-CM

## 2017-08-18 DIAGNOSIS — M17.11 OSTEOARTHRITIS OF RIGHT KNEE, UNSPECIFIED OSTEOARTHRITIS TYPE: ICD-10-CM

## 2017-08-18 DIAGNOSIS — Z01.818 PREOP EXAMINATION: ICD-10-CM

## 2017-08-18 LAB
ALBUMIN SERPL BCP-MCNC: 4 G/DL (ref 3.5–5)
ALP SERPL-CCNC: 89 U/L (ref 46–116)
ALT SERPL W P-5'-P-CCNC: 32 U/L (ref 12–78)
ANION GAP SERPL CALCULATED.3IONS-SCNC: 6 MMOL/L (ref 4–13)
AST SERPL W P-5'-P-CCNC: 22 U/L (ref 5–45)
BASOPHILS # BLD AUTO: 0.03 THOUSANDS/ΜL (ref 0–0.1)
BASOPHILS NFR BLD AUTO: 1 % (ref 0–1)
BILIRUB SERPL-MCNC: 0.47 MG/DL (ref 0.2–1)
BILIRUB UR QL STRIP: NEGATIVE
BUN SERPL-MCNC: 31 MG/DL (ref 5–25)
CALCIUM SERPL-MCNC: 9.1 MG/DL (ref 8.3–10.1)
CHLORIDE SERPL-SCNC: 101 MMOL/L (ref 100–108)
CLARITY UR: NORMAL
CO2 SERPL-SCNC: 31 MMOL/L (ref 21–32)
COLOR UR: YELLOW
CREAT SERPL-MCNC: 1.14 MG/DL (ref 0.6–1.3)
EOSINOPHIL # BLD AUTO: 0.18 THOUSAND/ΜL (ref 0–0.61)
EOSINOPHIL NFR BLD AUTO: 3 % (ref 0–6)
ERYTHROCYTE [DISTWIDTH] IN BLOOD BY AUTOMATED COUNT: 13.2 % (ref 11.6–15.1)
GFR SERPL CREATININE-BSD FRML MDRD: 67 ML/MIN/1.73SQ M
GLUCOSE SERPL-MCNC: 163 MG/DL (ref 65–140)
GLUCOSE UR STRIP-MCNC: NEGATIVE MG/DL
HCT VFR BLD AUTO: 35.3 % (ref 36.5–49.3)
HGB BLD-MCNC: 11.6 G/DL (ref 12–17)
HGB UR QL STRIP.AUTO: NEGATIVE
INR PPP: 1.05 (ref 0.86–1.16)
KETONES UR STRIP-MCNC: NEGATIVE MG/DL
LEUKOCYTE ESTERASE UR QL STRIP: NEGATIVE
LYMPHOCYTES # BLD AUTO: 2.2 THOUSANDS/ΜL (ref 0.6–4.47)
LYMPHOCYTES NFR BLD AUTO: 36 % (ref 14–44)
MCH RBC QN AUTO: 31.1 PG (ref 26.8–34.3)
MCHC RBC AUTO-ENTMCNC: 32.9 G/DL (ref 31.4–37.4)
MCV RBC AUTO: 95 FL (ref 82–98)
MONOCYTES # BLD AUTO: 0.6 THOUSAND/ΜL (ref 0.17–1.22)
MONOCYTES NFR BLD AUTO: 10 % (ref 4–12)
NEUTROPHILS # BLD AUTO: 3.1 THOUSANDS/ΜL (ref 1.85–7.62)
NEUTS SEG NFR BLD AUTO: 50 % (ref 43–75)
NITRITE UR QL STRIP: NEGATIVE
NRBC BLD AUTO-RTO: 0 /100 WBCS
PH UR STRIP.AUTO: 5.5 [PH] (ref 4.5–8)
PLATELET # BLD AUTO: 283 THOUSANDS/UL (ref 149–390)
PMV BLD AUTO: 10.9 FL (ref 8.9–12.7)
POTASSIUM SERPL-SCNC: 4 MMOL/L (ref 3.5–5.3)
PROT SERPL-MCNC: 7.5 G/DL (ref 6.4–8.2)
PROT UR STRIP-MCNC: NEGATIVE MG/DL
PROTHROMBIN TIME: 13.7 SECONDS (ref 12.1–14.4)
RBC # BLD AUTO: 3.73 MILLION/UL (ref 3.88–5.62)
SODIUM SERPL-SCNC: 138 MMOL/L (ref 136–145)
SP GR UR STRIP.AUTO: >=1.03 (ref 1–1.03)
UROBILINOGEN UR QL STRIP.AUTO: 1 E.U./DL
WBC # BLD AUTO: 6.11 THOUSAND/UL (ref 4.31–10.16)

## 2017-08-18 PROCEDURE — 97110 THERAPEUTIC EXERCISES: CPT

## 2017-08-18 PROCEDURE — 81003 URINALYSIS AUTO W/O SCOPE: CPT | Performed by: ORTHOPAEDIC SURGERY

## 2017-08-18 PROCEDURE — 85025 COMPLETE CBC W/AUTO DIFF WBC: CPT

## 2017-08-18 PROCEDURE — 36415 COLL VENOUS BLD VENIPUNCTURE: CPT

## 2017-08-18 PROCEDURE — 80053 COMPREHEN METABOLIC PANEL: CPT

## 2017-08-18 PROCEDURE — 85610 PROTHROMBIN TIME: CPT

## 2017-08-18 PROCEDURE — 97140 MANUAL THERAPY 1/> REGIONS: CPT

## 2017-08-18 RX ORDER — ASPIRIN 325 MG
325 TABLET ORAL 2 TIMES DAILY
COMMUNITY
End: 2018-04-17 | Stop reason: ALTCHOICE

## 2017-08-18 RX ORDER — TRAMADOL HYDROCHLORIDE 50 MG/1
50 TABLET ORAL EVERY 6 HOURS PRN
COMMUNITY
End: 2018-04-17 | Stop reason: ALTCHOICE

## 2017-08-18 RX ORDER — OXYCODONE HYDROCHLORIDE 5 MG/1
5 CAPSULE ORAL EVERY 4 HOURS PRN
COMMUNITY
End: 2018-04-17 | Stop reason: ALTCHOICE

## 2017-08-21 ENCOUNTER — APPOINTMENT (OUTPATIENT)
Dept: PHYSICAL THERAPY | Facility: MEDICAL CENTER | Age: 67
End: 2017-08-21
Payer: COMMERCIAL

## 2017-08-21 PROCEDURE — 97140 MANUAL THERAPY 1/> REGIONS: CPT

## 2017-08-21 PROCEDURE — 97110 THERAPEUTIC EXERCISES: CPT

## 2017-08-23 ENCOUNTER — APPOINTMENT (OUTPATIENT)
Dept: PHYSICAL THERAPY | Facility: MEDICAL CENTER | Age: 67
End: 2017-08-23
Payer: COMMERCIAL

## 2017-08-23 PROCEDURE — 97110 THERAPEUTIC EXERCISES: CPT

## 2017-08-23 PROCEDURE — 97140 MANUAL THERAPY 1/> REGIONS: CPT

## 2017-08-25 ENCOUNTER — APPOINTMENT (OUTPATIENT)
Dept: PHYSICAL THERAPY | Facility: MEDICAL CENTER | Age: 67
End: 2017-08-25
Payer: COMMERCIAL

## 2017-08-25 PROCEDURE — 97110 THERAPEUTIC EXERCISES: CPT

## 2017-08-28 ENCOUNTER — GENERIC CONVERSION - ENCOUNTER (OUTPATIENT)
Dept: OTHER | Facility: OTHER | Age: 67
End: 2017-08-28

## 2017-08-28 ENCOUNTER — APPOINTMENT (OUTPATIENT)
Dept: PHYSICAL THERAPY | Facility: MEDICAL CENTER | Age: 67
End: 2017-08-28
Payer: COMMERCIAL

## 2017-08-28 ENCOUNTER — ALLSCRIPTS OFFICE VISIT (OUTPATIENT)
Dept: OTHER | Facility: OTHER | Age: 67
End: 2017-08-28

## 2017-08-28 DIAGNOSIS — D62 ACUTE POSTHEMORRHAGIC ANEMIA: ICD-10-CM

## 2017-08-28 PROCEDURE — 97110 THERAPEUTIC EXERCISES: CPT

## 2017-08-28 PROCEDURE — 97140 MANUAL THERAPY 1/> REGIONS: CPT

## 2017-08-30 ENCOUNTER — APPOINTMENT (OUTPATIENT)
Dept: PHYSICAL THERAPY | Facility: MEDICAL CENTER | Age: 67
End: 2017-08-30
Payer: COMMERCIAL

## 2017-08-30 PROCEDURE — 97140 MANUAL THERAPY 1/> REGIONS: CPT

## 2017-08-30 PROCEDURE — 97110 THERAPEUTIC EXERCISES: CPT

## 2017-09-01 ENCOUNTER — APPOINTMENT (OUTPATIENT)
Dept: PHYSICAL THERAPY | Facility: MEDICAL CENTER | Age: 67
End: 2017-09-01
Payer: COMMERCIAL

## 2017-09-01 PROCEDURE — 97140 MANUAL THERAPY 1/> REGIONS: CPT

## 2017-09-01 PROCEDURE — G8991 OTHER PT/OT GOAL STATUS: HCPCS

## 2017-09-01 PROCEDURE — 97110 THERAPEUTIC EXERCISES: CPT

## 2017-09-01 RX ORDER — TRANEXAMIC ACID 100 MG/ML
1000 INJECTION, SOLUTION INTRAVENOUS ONCE
Status: CANCELLED | OUTPATIENT
Start: 2017-09-05 | End: 2017-09-01

## 2017-09-04 DIAGNOSIS — M17.11 PRIMARY OSTEOARTHRITIS OF RIGHT KNEE: Primary | ICD-10-CM

## 2017-09-04 RX ORDER — CALCIUM CARBONATE 200(500)MG
1000 TABLET,CHEWABLE ORAL DAILY PRN
Status: CANCELLED | OUTPATIENT
Start: 2017-09-04

## 2017-09-04 RX ORDER — TRAMADOL HYDROCHLORIDE 50 MG/1
50 TABLET ORAL EVERY 6 HOURS PRN
Status: CANCELLED | OUTPATIENT
Start: 2017-09-04

## 2017-09-04 RX ORDER — ASPIRIN 325 MG
325 TABLET ORAL 2 TIMES DAILY
Status: CANCELLED | OUTPATIENT
Start: 2017-09-04

## 2017-09-04 RX ORDER — DIPHENHYDRAMINE HYDROCHLORIDE 50 MG/ML
50 INJECTION INTRAMUSCULAR; INTRAVENOUS EVERY 6 HOURS PRN
Status: CANCELLED | OUTPATIENT
Start: 2017-09-04

## 2017-09-04 RX ORDER — KETOROLAC TROMETHAMINE 30 MG/ML
15 INJECTION, SOLUTION INTRAMUSCULAR; INTRAVENOUS EVERY 6 HOURS PRN
Status: CANCELLED | OUTPATIENT
Start: 2017-09-04 | End: 2017-09-06

## 2017-09-04 RX ORDER — METHOCARBAMOL 500 MG/1
500 TABLET, FILM COATED ORAL EVERY 6 HOURS PRN
Status: CANCELLED | OUTPATIENT
Start: 2017-09-04

## 2017-09-04 RX ORDER — DOCUSATE SODIUM 100 MG/1
100 CAPSULE, LIQUID FILLED ORAL 2 TIMES DAILY
Status: CANCELLED | OUTPATIENT
Start: 2017-09-04

## 2017-09-04 RX ORDER — MORPHINE SULFATE 2 MG/ML
2 INJECTION, SOLUTION INTRAMUSCULAR; INTRAVENOUS EVERY 2 HOUR PRN
Status: CANCELLED | OUTPATIENT
Start: 2017-09-04

## 2017-09-04 RX ORDER — ONDANSETRON 2 MG/ML
4 INJECTION INTRAMUSCULAR; INTRAVENOUS EVERY 8 HOURS PRN
Status: CANCELLED | OUTPATIENT
Start: 2017-09-04

## 2017-09-04 RX ORDER — NALOXONE HYDROCHLORIDE 0.4 MG/ML
0.04 INJECTION, SOLUTION INTRAMUSCULAR; INTRAVENOUS; SUBCUTANEOUS AS NEEDED
Status: CANCELLED | OUTPATIENT
Start: 2017-09-04

## 2017-09-04 RX ORDER — ACETAMINOPHEN 325 MG/1
650 TABLET ORAL EVERY 6 HOURS PRN
Status: CANCELLED | OUTPATIENT
Start: 2017-09-04

## 2017-09-04 RX ORDER — OXYCODONE HYDROCHLORIDE 5 MG/1
5 TABLET ORAL EVERY 4 HOURS PRN
Status: CANCELLED | OUTPATIENT
Start: 2017-09-04

## 2017-09-04 RX ORDER — BISACODYL 10 MG
10 SUPPOSITORY, RECTAL RECTAL DAILY PRN
Status: CANCELLED | OUTPATIENT
Start: 2017-09-04

## 2017-09-04 RX ORDER — SODIUM CHLORIDE, SODIUM LACTATE, POTASSIUM CHLORIDE, CALCIUM CHLORIDE 600; 310; 30; 20 MG/100ML; MG/100ML; MG/100ML; MG/100ML
100 INJECTION, SOLUTION INTRAVENOUS CONTINUOUS
Status: CANCELLED | OUTPATIENT
Start: 2017-09-04

## 2017-09-04 RX ORDER — OXYCODONE HYDROCHLORIDE 10 MG/1
10 TABLET ORAL EVERY 4 HOURS PRN
Status: CANCELLED | OUTPATIENT
Start: 2017-09-04

## 2017-09-05 ENCOUNTER — ANESTHESIA (OUTPATIENT)
Dept: PERIOP | Facility: HOSPITAL | Age: 67
DRG: 470 | End: 2017-09-05
Payer: COMMERCIAL

## 2017-09-05 ENCOUNTER — APPOINTMENT (INPATIENT)
Dept: RADIOLOGY | Facility: HOSPITAL | Age: 67
DRG: 470 | End: 2017-09-05
Payer: COMMERCIAL

## 2017-09-05 ENCOUNTER — HOSPITAL ENCOUNTER (INPATIENT)
Facility: HOSPITAL | Age: 67
LOS: 2 days | Discharge: HOME HEALTH | DRG: 470 | End: 2017-09-07
Attending: ORTHOPAEDIC SURGERY | Admitting: ORTHOPAEDIC SURGERY
Payer: COMMERCIAL

## 2017-09-05 DIAGNOSIS — M17.11 PRIMARY OSTEOARTHRITIS OF RIGHT KNEE: ICD-10-CM

## 2017-09-05 PROCEDURE — 86900 BLOOD TYPING SEROLOGIC ABO: CPT | Performed by: ORTHOPAEDIC SURGERY

## 2017-09-05 PROCEDURE — C1776 JOINT DEVICE (IMPLANTABLE): HCPCS | Performed by: ORTHOPAEDIC SURGERY

## 2017-09-05 PROCEDURE — G8979 MOBILITY GOAL STATUS: HCPCS | Performed by: PHYSICAL THERAPIST

## 2017-09-05 PROCEDURE — C1713 ANCHOR/SCREW BN/BN,TIS/BN: HCPCS | Performed by: ORTHOPAEDIC SURGERY

## 2017-09-05 PROCEDURE — 94762 N-INVAS EAR/PLS OXIMTRY CONT: CPT

## 2017-09-05 PROCEDURE — 86901 BLOOD TYPING SEROLOGIC RH(D): CPT | Performed by: ORTHOPAEDIC SURGERY

## 2017-09-05 PROCEDURE — 97163 PT EVAL HIGH COMPLEX 45 MIN: CPT | Performed by: PHYSICAL THERAPIST

## 2017-09-05 PROCEDURE — G8987 SELF CARE CURRENT STATUS: HCPCS

## 2017-09-05 PROCEDURE — G8988 SELF CARE GOAL STATUS: HCPCS

## 2017-09-05 PROCEDURE — G8978 MOBILITY CURRENT STATUS: HCPCS | Performed by: PHYSICAL THERAPIST

## 2017-09-05 PROCEDURE — G8989 SELF CARE D/C STATUS: HCPCS

## 2017-09-05 PROCEDURE — 0SRC0J9 REPLACEMENT OF RIGHT KNEE JOINT WITH SYNTHETIC SUBSTITUTE, CEMENTED, OPEN APPROACH: ICD-10-PCS | Performed by: ORTHOPAEDIC SURGERY

## 2017-09-05 PROCEDURE — 73560 X-RAY EXAM OF KNEE 1 OR 2: CPT

## 2017-09-05 PROCEDURE — 97166 OT EVAL MOD COMPLEX 45 MIN: CPT

## 2017-09-05 PROCEDURE — 86850 RBC ANTIBODY SCREEN: CPT | Performed by: ORTHOPAEDIC SURGERY

## 2017-09-05 DEVICE — SMARTSET GMV HIGH PERFORMANCE GENTAMICIN MEDIUM VISCOSITY BONE CEMENT 40G
Type: IMPLANTABLE DEVICE | Site: KNEE | Status: FUNCTIONAL
Brand: SMARTSET

## 2017-09-05 DEVICE — ATTUNE PATELLA MEDIALIZED ANATOMIC 35MM CEMENTED AOX
Type: IMPLANTABLE DEVICE | Site: PATELLA | Status: FUNCTIONAL
Brand: ATTUNE

## 2017-09-05 DEVICE — ATTUNE KNEE SYSTEM FEMORAL POSTERIOR STABILIZED SIZE 6 RIGHT CEMENTED
Type: IMPLANTABLE DEVICE | Site: KNEE | Status: FUNCTIONAL
Brand: ATTUNE

## 2017-09-05 DEVICE — ATTUNE KNEE SYSTEM TIBIAL BASE FIXED BEARING SIZE 6 CEMENTED
Type: IMPLANTABLE DEVICE | Site: KNEE | Status: FUNCTIONAL
Brand: ATTUNE

## 2017-09-05 DEVICE — ATTUNE KNEE SYSTEM TIBIAL INSERT FIXED BEARING POSTERIOR STABILIZED 6 7MM AOX
Type: IMPLANTABLE DEVICE | Site: KNEE | Status: FUNCTIONAL
Brand: ATTUNE

## 2017-09-05 RX ORDER — PNV NO.95/FERROUS FUM/FOLIC AC 28MG-0.8MG
1 TABLET ORAL 2 TIMES DAILY
COMMUNITY
End: 2018-04-17 | Stop reason: ALTCHOICE

## 2017-09-05 RX ORDER — LATANOPROST 50 UG/ML
1 SOLUTION/ DROPS OPHTHALMIC
Status: DISCONTINUED | OUTPATIENT
Start: 2017-09-05 | End: 2017-09-07 | Stop reason: HOSPADM

## 2017-09-05 RX ORDER — PROPOFOL 10 MG/ML
INJECTION, EMULSION INTRAVENOUS CONTINUOUS PRN
Status: DISCONTINUED | OUTPATIENT
Start: 2017-09-05 | End: 2017-09-05 | Stop reason: SURG

## 2017-09-05 RX ORDER — TAMSULOSIN HYDROCHLORIDE 0.4 MG/1
0.4 CAPSULE ORAL
Status: DISCONTINUED | OUTPATIENT
Start: 2017-09-06 | End: 2017-09-07 | Stop reason: HOSPADM

## 2017-09-05 RX ORDER — ONDANSETRON 2 MG/ML
INJECTION INTRAMUSCULAR; INTRAVENOUS AS NEEDED
Status: DISCONTINUED | OUTPATIENT
Start: 2017-09-05 | End: 2017-09-05 | Stop reason: SURG

## 2017-09-05 RX ORDER — LANSOPRAZOLE 15 MG/1
15 CAPSULE, DELAYED RELEASE ORAL DAILY
COMMUNITY
End: 2018-04-17 | Stop reason: ALTCHOICE

## 2017-09-05 RX ORDER — ONDANSETRON 2 MG/ML
4 INJECTION INTRAMUSCULAR; INTRAVENOUS EVERY 8 HOURS PRN
Status: DISCONTINUED | OUTPATIENT
Start: 2017-09-05 | End: 2017-09-07 | Stop reason: HOSPADM

## 2017-09-05 RX ORDER — TRAMADOL HYDROCHLORIDE 50 MG/1
50 TABLET ORAL EVERY 6 HOURS PRN
Status: DISCONTINUED | OUTPATIENT
Start: 2017-09-06 | End: 2017-09-07 | Stop reason: HOSPADM

## 2017-09-05 RX ORDER — MORPHINE SULFATE 0.5 MG/ML
INJECTION, SOLUTION EPIDURAL; INTRATHECAL; INTRAVENOUS AS NEEDED
Status: DISCONTINUED | OUTPATIENT
Start: 2017-09-05 | End: 2017-09-05 | Stop reason: SURG

## 2017-09-05 RX ORDER — LOSARTAN POTASSIUM 50 MG/1
100 TABLET ORAL DAILY
Status: DISCONTINUED | OUTPATIENT
Start: 2017-09-06 | End: 2017-09-07 | Stop reason: HOSPADM

## 2017-09-05 RX ORDER — ONDANSETRON 2 MG/ML
4 INJECTION INTRAMUSCULAR; INTRAVENOUS ONCE AS NEEDED
Status: DISCONTINUED | OUTPATIENT
Start: 2017-09-05 | End: 2017-09-05 | Stop reason: HOSPADM

## 2017-09-05 RX ORDER — TRANEXAMIC ACID 100 MG/ML
INJECTION, SOLUTION INTRAVENOUS AS NEEDED
Status: DISCONTINUED | OUTPATIENT
Start: 2017-09-05 | End: 2017-09-05 | Stop reason: HOSPADM

## 2017-09-05 RX ORDER — CELECOXIB 200 MG/1
200 CAPSULE ORAL DAILY
Status: DISCONTINUED | OUTPATIENT
Start: 2017-09-05 | End: 2017-09-07 | Stop reason: HOSPADM

## 2017-09-05 RX ORDER — DIPHENHYDRAMINE HYDROCHLORIDE 50 MG/ML
25 INJECTION INTRAMUSCULAR; INTRAVENOUS EVERY 6 HOURS PRN
Status: DISPENSED | OUTPATIENT
Start: 2017-09-05 | End: 2017-09-06

## 2017-09-05 RX ORDER — TRANEXAMIC ACID 100 MG/ML
INJECTION, SOLUTION INTRAVENOUS AS NEEDED
Status: DISCONTINUED | OUTPATIENT
Start: 2017-09-05 | End: 2017-09-05 | Stop reason: SURG

## 2017-09-05 RX ORDER — BUPIVACAINE HYDROCHLORIDE 7.5 MG/ML
INJECTION, SOLUTION INTRASPINAL AS NEEDED
Status: DISCONTINUED | OUTPATIENT
Start: 2017-09-05 | End: 2017-09-05 | Stop reason: SURG

## 2017-09-05 RX ORDER — METHOCARBAMOL 500 MG/1
500 TABLET, FILM COATED ORAL EVERY 6 HOURS PRN
Status: DISCONTINUED | OUTPATIENT
Start: 2017-09-05 | End: 2017-09-07 | Stop reason: HOSPADM

## 2017-09-05 RX ORDER — ASPIRIN 325 MG
325 TABLET ORAL 2 TIMES DAILY
Status: DISCONTINUED | OUTPATIENT
Start: 2017-09-05 | End: 2017-09-07 | Stop reason: HOSPADM

## 2017-09-05 RX ORDER — ACETAMINOPHEN 325 MG/1
650 TABLET ORAL EVERY 6 HOURS PRN
Status: DISCONTINUED | OUTPATIENT
Start: 2017-09-05 | End: 2017-09-07 | Stop reason: HOSPADM

## 2017-09-05 RX ORDER — SODIUM CHLORIDE, SODIUM LACTATE, POTASSIUM CHLORIDE, CALCIUM CHLORIDE 600; 310; 30; 20 MG/100ML; MG/100ML; MG/100ML; MG/100ML
100 INJECTION, SOLUTION INTRAVENOUS CONTINUOUS
Status: DISCONTINUED | OUTPATIENT
Start: 2017-09-05 | End: 2017-09-07 | Stop reason: HOSPADM

## 2017-09-05 RX ORDER — PANTOPRAZOLE SODIUM 40 MG/1
40 TABLET, DELAYED RELEASE ORAL
Status: DISCONTINUED | OUTPATIENT
Start: 2017-09-05 | End: 2017-09-05 | Stop reason: SDUPTHER

## 2017-09-05 RX ORDER — KETOROLAC TROMETHAMINE 30 MG/ML
15 INJECTION, SOLUTION INTRAMUSCULAR; INTRAVENOUS EVERY 6 HOURS PRN
Status: DISCONTINUED | OUTPATIENT
Start: 2017-09-06 | End: 2017-09-07 | Stop reason: HOSPADM

## 2017-09-05 RX ORDER — AMLODIPINE BESYLATE 5 MG/1
5 TABLET ORAL DAILY
Status: DISCONTINUED | OUTPATIENT
Start: 2017-09-06 | End: 2017-09-07 | Stop reason: HOSPADM

## 2017-09-05 RX ORDER — NALBUPHINE HCL 10 MG/ML
5 AMPUL (ML) INJECTION
Status: ACTIVE | OUTPATIENT
Start: 2017-09-05 | End: 2017-09-06

## 2017-09-05 RX ORDER — MORPHINE SULFATE 4 MG/ML
2 INJECTION, SOLUTION INTRAMUSCULAR; INTRAVENOUS EVERY 2 HOUR PRN
Status: DISCONTINUED | OUTPATIENT
Start: 2017-09-06 | End: 2017-09-07 | Stop reason: HOSPADM

## 2017-09-05 RX ORDER — METOCLOPRAMIDE HYDROCHLORIDE 5 MG/ML
5 INJECTION INTRAMUSCULAR; INTRAVENOUS EVERY 6 HOURS PRN
Status: ACTIVE | OUTPATIENT
Start: 2017-09-05 | End: 2017-09-06

## 2017-09-05 RX ORDER — OXYCODONE HYDROCHLORIDE 5 MG/1
5 TABLET ORAL EVERY 4 HOURS PRN
Status: DISCONTINUED | OUTPATIENT
Start: 2017-09-06 | End: 2017-09-07 | Stop reason: HOSPADM

## 2017-09-05 RX ORDER — PANTOPRAZOLE SODIUM 40 MG/1
40 TABLET, DELAYED RELEASE ORAL
Status: DISCONTINUED | OUTPATIENT
Start: 2017-09-06 | End: 2017-09-07 | Stop reason: HOSPADM

## 2017-09-05 RX ORDER — ONDANSETRON 2 MG/ML
4 INJECTION INTRAMUSCULAR; INTRAVENOUS EVERY 4 HOURS PRN
Status: ACTIVE | OUTPATIENT
Start: 2017-09-05 | End: 2017-09-06

## 2017-09-05 RX ORDER — OXYCODONE HYDROCHLORIDE 10 MG/1
10 TABLET ORAL EVERY 4 HOURS PRN
Status: DISCONTINUED | OUTPATIENT
Start: 2017-09-06 | End: 2017-09-07 | Stop reason: HOSPADM

## 2017-09-05 RX ORDER — METOPROLOL SUCCINATE 50 MG/1
50 TABLET, EXTENDED RELEASE ORAL DAILY
Status: DISCONTINUED | OUTPATIENT
Start: 2017-09-06 | End: 2017-09-07 | Stop reason: HOSPADM

## 2017-09-05 RX ORDER — MELATONIN 5 MG
2 TABLET,CHEWABLE ORAL
COMMUNITY
End: 2018-04-17 | Stop reason: ALTCHOICE

## 2017-09-05 RX ORDER — CALCIUM CARBONATE 200(500)MG
1000 TABLET,CHEWABLE ORAL DAILY PRN
Status: DISCONTINUED | OUTPATIENT
Start: 2017-09-05 | End: 2017-09-07 | Stop reason: HOSPADM

## 2017-09-05 RX ORDER — FENTANYL CITRATE/PF 50 MCG/ML
25 SYRINGE (ML) INJECTION
Status: DISCONTINUED | OUTPATIENT
Start: 2017-09-05 | End: 2017-09-05 | Stop reason: HOSPADM

## 2017-09-05 RX ORDER — FENTANYL CITRATE 50 UG/ML
INJECTION, SOLUTION INTRAMUSCULAR; INTRAVENOUS AS NEEDED
Status: DISCONTINUED | OUTPATIENT
Start: 2017-09-05 | End: 2017-09-05 | Stop reason: SURG

## 2017-09-05 RX ORDER — BISACODYL 10 MG
10 SUPPOSITORY, RECTAL RECTAL DAILY PRN
Status: DISCONTINUED | OUTPATIENT
Start: 2017-09-05 | End: 2017-09-07 | Stop reason: HOSPADM

## 2017-09-05 RX ORDER — SODIUM CHLORIDE 9 MG/ML
125 INJECTION, SOLUTION INTRAVENOUS CONTINUOUS
Status: DISCONTINUED | OUTPATIENT
Start: 2017-09-05 | End: 2017-09-07 | Stop reason: HOSPADM

## 2017-09-05 RX ORDER — SPIRONOLACTONE 25 MG/1
25 TABLET ORAL DAILY
Status: DISCONTINUED | OUTPATIENT
Start: 2017-09-06 | End: 2017-09-07 | Stop reason: HOSPADM

## 2017-09-05 RX ORDER — DIPHENHYDRAMINE HYDROCHLORIDE 50 MG/ML
50 INJECTION INTRAMUSCULAR; INTRAVENOUS EVERY 6 HOURS PRN
Status: DISCONTINUED | OUTPATIENT
Start: 2017-09-06 | End: 2017-09-07 | Stop reason: HOSPADM

## 2017-09-05 RX ORDER — DOCUSATE SODIUM 100 MG/1
100 CAPSULE, LIQUID FILLED ORAL 2 TIMES DAILY
Status: DISCONTINUED | OUTPATIENT
Start: 2017-09-05 | End: 2017-09-07 | Stop reason: HOSPADM

## 2017-09-05 RX ORDER — MAGNESIUM HYDROXIDE 1200 MG/15ML
LIQUID ORAL AS NEEDED
Status: DISCONTINUED | OUTPATIENT
Start: 2017-09-05 | End: 2017-09-05 | Stop reason: HOSPADM

## 2017-09-05 RX ORDER — NALOXONE HYDROCHLORIDE 0.4 MG/ML
0.04 INJECTION, SOLUTION INTRAMUSCULAR; INTRAVENOUS; SUBCUTANEOUS AS NEEDED
Status: DISCONTINUED | OUTPATIENT
Start: 2017-09-05 | End: 2017-09-07 | Stop reason: HOSPADM

## 2017-09-05 RX ORDER — MIDAZOLAM HYDROCHLORIDE 1 MG/ML
INJECTION INTRAMUSCULAR; INTRAVENOUS AS NEEDED
Status: DISCONTINUED | OUTPATIENT
Start: 2017-09-05 | End: 2017-09-05 | Stop reason: SURG

## 2017-09-05 RX ADMIN — CELECOXIB 200 MG: 200 CAPSULE ORAL at 15:03

## 2017-09-05 RX ADMIN — PANTOPRAZOLE SODIUM 40 MG: 40 TABLET, DELAYED RELEASE ORAL at 15:03

## 2017-09-05 RX ADMIN — TRANEXAMIC ACID 1000 MG: 100 INJECTION, SOLUTION INTRAVENOUS at 08:21

## 2017-09-05 RX ADMIN — DOCUSATE SODIUM 100 MG: 100 CAPSULE, LIQUID FILLED ORAL at 15:03

## 2017-09-05 RX ADMIN — PROPOFOL 70 MCG/KG/MIN: 10 INJECTION, EMULSION INTRAVENOUS at 08:18

## 2017-09-05 RX ADMIN — SODIUM CHLORIDE: 0.9 INJECTION, SOLUTION INTRAVENOUS at 08:36

## 2017-09-05 RX ADMIN — ONDANSETRON 4 MG: 2 INJECTION INTRAMUSCULAR; INTRAVENOUS at 15:04

## 2017-09-05 RX ADMIN — MIDAZOLAM HYDROCHLORIDE 4 MG: 1 INJECTION, SOLUTION INTRAMUSCULAR; INTRAVENOUS at 07:46

## 2017-09-05 RX ADMIN — MORPHINE SULFATE 0.1 MG: 0.5 INJECTION, SOLUTION EPIDURAL; INTRATHECAL; INTRAVENOUS at 08:16

## 2017-09-05 RX ADMIN — SODIUM CHLORIDE, SODIUM LACTATE, POTASSIUM CHLORIDE, AND CALCIUM CHLORIDE 100 ML/HR: .6; .31; .03; .02 INJECTION, SOLUTION INTRAVENOUS at 10:40

## 2017-09-05 RX ADMIN — SODIUM CHLORIDE, SODIUM LACTATE, POTASSIUM CHLORIDE, AND CALCIUM CHLORIDE 100 ML/HR: .6; .31; .03; .02 INJECTION, SOLUTION INTRAVENOUS at 21:56

## 2017-09-05 RX ADMIN — FENTANYL CITRATE 100 MCG: 50 INJECTION, SOLUTION INTRAMUSCULAR; INTRAVENOUS at 07:46

## 2017-09-05 RX ADMIN — BUPIVACAINE HYDROCHLORIDE IN DEXTROSE 1.8 ML: 7.5 INJECTION, SOLUTION SUBARACHNOID at 08:16

## 2017-09-05 RX ADMIN — ASPIRIN 325 MG: 325 TABLET ORAL at 18:36

## 2017-09-05 RX ADMIN — LATANOPROST 1 DROP: 50 SOLUTION OPHTHALMIC at 23:41

## 2017-09-05 RX ADMIN — SODIUM CHLORIDE 125 ML/HR: 0.9 INJECTION, SOLUTION INTRAVENOUS at 07:12

## 2017-09-05 RX ADMIN — SODIUM CHLORIDE: 0.9 INJECTION, SOLUTION INTRAVENOUS at 09:31

## 2017-09-05 RX ADMIN — ONDANSETRON HYDROCHLORIDE 4 MG: 2 INJECTION, SOLUTION INTRAVENOUS at 09:25

## 2017-09-05 RX ADMIN — CEFAZOLIN SODIUM 2000 MG: 2 SOLUTION INTRAVENOUS at 08:00

## 2017-09-05 RX ADMIN — CEFAZOLIN SODIUM 1000 MG: 1 SOLUTION INTRAVENOUS at 16:19

## 2017-09-06 LAB
ERYTHROCYTE [DISTWIDTH] IN BLOOD BY AUTOMATED COUNT: 13.1 % (ref 11.6–15.1)
HCT VFR BLD AUTO: 30.9 % (ref 36.5–49.3)
HGB BLD-MCNC: 10.2 G/DL (ref 12–17)
MCH RBC QN AUTO: 31.5 PG (ref 26.8–34.3)
MCHC RBC AUTO-ENTMCNC: 33 G/DL (ref 31.4–37.4)
MCV RBC AUTO: 95 FL (ref 82–98)
PLATELET # BLD AUTO: 226 THOUSANDS/UL (ref 149–390)
PMV BLD AUTO: 10.9 FL (ref 8.9–12.7)
RBC # BLD AUTO: 3.24 MILLION/UL (ref 3.88–5.62)
WBC # BLD AUTO: 9.19 THOUSAND/UL (ref 4.31–10.16)

## 2017-09-06 PROCEDURE — 85027 COMPLETE CBC AUTOMATED: CPT | Performed by: PHYSICIAN ASSISTANT

## 2017-09-06 PROCEDURE — 97530 THERAPEUTIC ACTIVITIES: CPT

## 2017-09-06 PROCEDURE — 97110 THERAPEUTIC EXERCISES: CPT

## 2017-09-06 PROCEDURE — 97116 GAIT TRAINING THERAPY: CPT

## 2017-09-06 RX ADMIN — ACETAMINOPHEN 650 MG: 325 TABLET, FILM COATED ORAL at 00:26

## 2017-09-06 RX ADMIN — CEFAZOLIN SODIUM 1000 MG: 1 SOLUTION INTRAVENOUS at 00:27

## 2017-09-06 RX ADMIN — DIPHENHYDRAMINE HYDROCHLORIDE 50 MG: 50 INJECTION, SOLUTION INTRAMUSCULAR; INTRAVENOUS at 22:55

## 2017-09-06 RX ADMIN — LOSARTAN POTASSIUM 100 MG: 50 TABLET, FILM COATED ORAL at 08:47

## 2017-09-06 RX ADMIN — HYDROMORPHONE HYDROCHLORIDE 0.5 MG: 1 INJECTION, SOLUTION INTRAMUSCULAR; INTRAVENOUS; SUBCUTANEOUS at 08:04

## 2017-09-06 RX ADMIN — OXYCODONE HYDROCHLORIDE 10 MG: 10 TABLET ORAL at 17:16

## 2017-09-06 RX ADMIN — DOCUSATE SODIUM 100 MG: 100 CAPSULE, LIQUID FILLED ORAL at 17:17

## 2017-09-06 RX ADMIN — PANTOPRAZOLE SODIUM 40 MG: 40 TABLET, DELAYED RELEASE ORAL at 07:55

## 2017-09-06 RX ADMIN — TAMSULOSIN HYDROCHLORIDE 0.4 MG: 0.4 CAPSULE ORAL at 17:17

## 2017-09-06 RX ADMIN — SPIRONOLACTONE 25 MG: 25 TABLET, FILM COATED ORAL at 08:48

## 2017-09-06 RX ADMIN — CELECOXIB 200 MG: 200 CAPSULE ORAL at 08:47

## 2017-09-06 RX ADMIN — PANTOPRAZOLE SODIUM 40 MG: 40 TABLET, DELAYED RELEASE ORAL at 17:17

## 2017-09-06 RX ADMIN — OXYCODONE HYDROCHLORIDE 10 MG: 10 TABLET ORAL at 13:06

## 2017-09-06 RX ADMIN — METOPROLOL SUCCINATE 50 MG: 50 TABLET, EXTENDED RELEASE ORAL at 08:48

## 2017-09-06 RX ADMIN — ASPIRIN 325 MG: 325 TABLET ORAL at 08:47

## 2017-09-06 RX ADMIN — KETOROLAC TROMETHAMINE 15 MG: 30 INJECTION, SOLUTION INTRAMUSCULAR at 11:01

## 2017-09-06 RX ADMIN — LATANOPROST 1 DROP: 50 SOLUTION OPHTHALMIC at 22:48

## 2017-09-06 RX ADMIN — OXYCODONE HYDROCHLORIDE 5 MG: 5 TABLET ORAL at 22:56

## 2017-09-06 RX ADMIN — KETOROLAC TROMETHAMINE 15 MG: 30 INJECTION, SOLUTION INTRAMUSCULAR at 20:17

## 2017-09-06 RX ADMIN — ASPIRIN 325 MG: 325 TABLET ORAL at 17:15

## 2017-09-06 RX ADMIN — DIPHENHYDRAMINE HYDROCHLORIDE 25 MG: 50 INJECTION, SOLUTION INTRAMUSCULAR; INTRAVENOUS at 00:27

## 2017-09-06 RX ADMIN — DOCUSATE SODIUM 100 MG: 100 CAPSULE, LIQUID FILLED ORAL at 08:47

## 2017-09-06 RX ADMIN — Medication 1 TABLET: at 08:47

## 2017-09-07 VITALS
OXYGEN SATURATION: 96 % | BODY MASS INDEX: 34.66 KG/M2 | HEIGHT: 69 IN | SYSTOLIC BLOOD PRESSURE: 107 MMHG | HEART RATE: 66 BPM | WEIGHT: 234 LBS | RESPIRATION RATE: 18 BRPM | TEMPERATURE: 96.6 F | DIASTOLIC BLOOD PRESSURE: 57 MMHG

## 2017-09-07 PROCEDURE — 97116 GAIT TRAINING THERAPY: CPT

## 2017-09-07 PROCEDURE — 97110 THERAPEUTIC EXERCISES: CPT

## 2017-09-07 RX ORDER — CELECOXIB 200 MG/1
200 CAPSULE ORAL DAILY
Refills: 0
Start: 2017-09-07 | End: 2018-04-17 | Stop reason: ALTCHOICE

## 2017-09-07 RX ADMIN — TRAMADOL HYDROCHLORIDE 50 MG: 50 TABLET, COATED ORAL at 12:50

## 2017-09-07 RX ADMIN — OXYCODONE HYDROCHLORIDE 10 MG: 10 TABLET ORAL at 14:36

## 2017-09-07 RX ADMIN — CELECOXIB 200 MG: 200 CAPSULE ORAL at 09:32

## 2017-09-07 RX ADMIN — Medication 1 TABLET: at 09:32

## 2017-09-07 RX ADMIN — PANTOPRAZOLE SODIUM 40 MG: 40 TABLET, DELAYED RELEASE ORAL at 06:02

## 2017-09-07 RX ADMIN — TAMSULOSIN HYDROCHLORIDE 0.4 MG: 0.4 CAPSULE ORAL at 16:48

## 2017-09-07 RX ADMIN — SPIRONOLACTONE 25 MG: 25 TABLET, FILM COATED ORAL at 09:32

## 2017-09-07 RX ADMIN — METOPROLOL SUCCINATE 50 MG: 50 TABLET, EXTENDED RELEASE ORAL at 09:33

## 2017-09-07 RX ADMIN — HYDROMORPHONE HYDROCHLORIDE 0.5 MG: 1 INJECTION, SOLUTION INTRAMUSCULAR; INTRAVENOUS; SUBCUTANEOUS at 11:50

## 2017-09-07 RX ADMIN — OXYCODONE HYDROCHLORIDE 5 MG: 5 TABLET ORAL at 03:52

## 2017-09-07 RX ADMIN — ASPIRIN 325 MG: 325 TABLET ORAL at 17:36

## 2017-09-07 RX ADMIN — PANTOPRAZOLE SODIUM 40 MG: 40 TABLET, DELAYED RELEASE ORAL at 16:48

## 2017-09-07 RX ADMIN — KETOROLAC TROMETHAMINE 15 MG: 30 INJECTION, SOLUTION INTRAMUSCULAR at 07:55

## 2017-09-07 RX ADMIN — DOCUSATE SODIUM 100 MG: 100 CAPSULE, LIQUID FILLED ORAL at 09:33

## 2017-09-07 RX ADMIN — OXYCODONE HYDROCHLORIDE 5 MG: 5 TABLET ORAL at 09:40

## 2017-09-07 RX ADMIN — LOSARTAN POTASSIUM 100 MG: 50 TABLET, FILM COATED ORAL at 09:32

## 2017-09-07 RX ADMIN — OXYCODONE HYDROCHLORIDE 5 MG: 5 TABLET ORAL at 16:48

## 2017-09-07 RX ADMIN — AMLODIPINE BESYLATE 5 MG: 5 TABLET ORAL at 09:32

## 2017-09-07 RX ADMIN — ASPIRIN 325 MG: 325 TABLET ORAL at 09:32

## 2017-09-07 RX ADMIN — DOCUSATE SODIUM 100 MG: 100 CAPSULE, LIQUID FILLED ORAL at 17:36

## 2017-09-12 ENCOUNTER — GENERIC CONVERSION - ENCOUNTER (OUTPATIENT)
Dept: OTHER | Facility: OTHER | Age: 67
End: 2017-09-12

## 2017-09-25 ENCOUNTER — APPOINTMENT (OUTPATIENT)
Dept: PHYSICAL THERAPY | Facility: MEDICAL CENTER | Age: 67
End: 2017-09-25
Payer: COMMERCIAL

## 2017-09-25 PROCEDURE — G8990 OTHER PT/OT CURRENT STATUS: HCPCS

## 2017-09-25 PROCEDURE — 97110 THERAPEUTIC EXERCISES: CPT

## 2017-09-25 PROCEDURE — 97161 PT EVAL LOW COMPLEX 20 MIN: CPT

## 2017-09-25 PROCEDURE — G8991 OTHER PT/OT GOAL STATUS: HCPCS

## 2017-09-27 ENCOUNTER — APPOINTMENT (OUTPATIENT)
Dept: PHYSICAL THERAPY | Facility: MEDICAL CENTER | Age: 67
End: 2017-09-27
Payer: COMMERCIAL

## 2017-09-27 PROCEDURE — 97110 THERAPEUTIC EXERCISES: CPT

## 2017-09-27 PROCEDURE — 97140 MANUAL THERAPY 1/> REGIONS: CPT

## 2017-09-29 ENCOUNTER — APPOINTMENT (OUTPATIENT)
Dept: PHYSICAL THERAPY | Facility: MEDICAL CENTER | Age: 67
End: 2017-09-29
Payer: COMMERCIAL

## 2017-09-29 PROCEDURE — 97110 THERAPEUTIC EXERCISES: CPT

## 2017-09-29 PROCEDURE — 97140 MANUAL THERAPY 1/> REGIONS: CPT

## 2017-10-02 ENCOUNTER — APPOINTMENT (OUTPATIENT)
Dept: PHYSICAL THERAPY | Facility: MEDICAL CENTER | Age: 67
End: 2017-10-02
Payer: COMMERCIAL

## 2017-10-02 PROCEDURE — 97140 MANUAL THERAPY 1/> REGIONS: CPT

## 2017-10-02 PROCEDURE — 97110 THERAPEUTIC EXERCISES: CPT

## 2017-10-04 ENCOUNTER — APPOINTMENT (OUTPATIENT)
Dept: PHYSICAL THERAPY | Facility: MEDICAL CENTER | Age: 67
End: 2017-10-04
Payer: COMMERCIAL

## 2017-10-04 PROCEDURE — 97110 THERAPEUTIC EXERCISES: CPT

## 2017-10-06 ENCOUNTER — APPOINTMENT (OUTPATIENT)
Dept: PHYSICAL THERAPY | Facility: MEDICAL CENTER | Age: 67
End: 2017-10-06
Payer: COMMERCIAL

## 2017-10-06 PROCEDURE — 97110 THERAPEUTIC EXERCISES: CPT

## 2017-10-06 PROCEDURE — 97140 MANUAL THERAPY 1/> REGIONS: CPT

## 2017-10-11 ENCOUNTER — APPOINTMENT (OUTPATIENT)
Dept: PHYSICAL THERAPY | Facility: MEDICAL CENTER | Age: 67
End: 2017-10-11
Payer: COMMERCIAL

## 2017-10-11 ENCOUNTER — TRANSCRIBE ORDERS (OUTPATIENT)
Dept: ADMINISTRATIVE | Facility: HOSPITAL | Age: 67
End: 2017-10-11

## 2017-10-11 ENCOUNTER — APPOINTMENT (OUTPATIENT)
Dept: LAB | Facility: MEDICAL CENTER | Age: 67
End: 2017-10-11
Payer: COMMERCIAL

## 2017-10-11 DIAGNOSIS — D62 ACUTE POSTHEMORRHAGIC ANEMIA: ICD-10-CM

## 2017-10-11 LAB
BASOPHILS # BLD AUTO: 0.04 THOUSANDS/ΜL (ref 0–0.1)
BASOPHILS NFR BLD AUTO: 1 % (ref 0–1)
EOSINOPHIL # BLD AUTO: 0.16 THOUSAND/ΜL (ref 0–0.61)
EOSINOPHIL NFR BLD AUTO: 2 % (ref 0–6)
ERYTHROCYTE [DISTWIDTH] IN BLOOD BY AUTOMATED COUNT: 12.8 % (ref 11.6–15.1)
HCT VFR BLD AUTO: 36.3 % (ref 36.5–49.3)
HGB BLD-MCNC: 12.2 G/DL (ref 12–17)
LYMPHOCYTES # BLD AUTO: 2.08 THOUSANDS/ΜL (ref 0.6–4.47)
LYMPHOCYTES NFR BLD AUTO: 25 % (ref 14–44)
MCH RBC QN AUTO: 31.1 PG (ref 26.8–34.3)
MCHC RBC AUTO-ENTMCNC: 33.6 G/DL (ref 31.4–37.4)
MCV RBC AUTO: 93 FL (ref 82–98)
MONOCYTES # BLD AUTO: 0.77 THOUSAND/ΜL (ref 0.17–1.22)
MONOCYTES NFR BLD AUTO: 9 % (ref 4–12)
NEUTROPHILS # BLD AUTO: 5.38 THOUSANDS/ΜL (ref 1.85–7.62)
NEUTS SEG NFR BLD AUTO: 63 % (ref 43–75)
NRBC BLD AUTO-RTO: 0 /100 WBCS
PLATELET # BLD AUTO: 303 THOUSANDS/UL (ref 149–390)
PMV BLD AUTO: 11.5 FL (ref 8.9–12.7)
RBC # BLD AUTO: 3.92 MILLION/UL (ref 3.88–5.62)
WBC # BLD AUTO: 8.45 THOUSAND/UL (ref 4.31–10.16)

## 2017-10-11 PROCEDURE — 97140 MANUAL THERAPY 1/> REGIONS: CPT

## 2017-10-11 PROCEDURE — 97110 THERAPEUTIC EXERCISES: CPT

## 2017-10-11 PROCEDURE — 85025 COMPLETE CBC W/AUTO DIFF WBC: CPT

## 2017-10-11 PROCEDURE — 36415 COLL VENOUS BLD VENIPUNCTURE: CPT

## 2017-10-12 ENCOUNTER — GENERIC CONVERSION - ENCOUNTER (OUTPATIENT)
Dept: OTHER | Facility: OTHER | Age: 67
End: 2017-10-12

## 2017-10-13 ENCOUNTER — APPOINTMENT (OUTPATIENT)
Dept: PHYSICAL THERAPY | Facility: MEDICAL CENTER | Age: 67
End: 2017-10-13
Payer: COMMERCIAL

## 2017-10-13 PROCEDURE — 97140 MANUAL THERAPY 1/> REGIONS: CPT

## 2017-10-13 PROCEDURE — 97110 THERAPEUTIC EXERCISES: CPT

## 2017-10-13 PROCEDURE — 97530 THERAPEUTIC ACTIVITIES: CPT

## 2017-10-16 ENCOUNTER — ALLSCRIPTS OFFICE VISIT (OUTPATIENT)
Dept: OTHER | Facility: OTHER | Age: 67
End: 2017-10-16

## 2017-10-16 ENCOUNTER — APPOINTMENT (OUTPATIENT)
Dept: PHYSICAL THERAPY | Facility: MEDICAL CENTER | Age: 67
End: 2017-10-16
Payer: COMMERCIAL

## 2017-10-17 ENCOUNTER — APPOINTMENT (OUTPATIENT)
Dept: PHYSICAL THERAPY | Facility: MEDICAL CENTER | Age: 67
End: 2017-10-17
Payer: COMMERCIAL

## 2017-10-17 PROCEDURE — 97140 MANUAL THERAPY 1/> REGIONS: CPT

## 2017-10-17 PROCEDURE — 97530 THERAPEUTIC ACTIVITIES: CPT

## 2017-10-18 ENCOUNTER — APPOINTMENT (OUTPATIENT)
Dept: PHYSICAL THERAPY | Facility: MEDICAL CENTER | Age: 67
End: 2017-10-18
Payer: COMMERCIAL

## 2017-10-18 PROCEDURE — 97110 THERAPEUTIC EXERCISES: CPT

## 2017-10-18 PROCEDURE — 97140 MANUAL THERAPY 1/> REGIONS: CPT

## 2017-10-18 PROCEDURE — 97112 NEUROMUSCULAR REEDUCATION: CPT

## 2017-10-18 NOTE — PROGRESS NOTES
Assessment  1  Hypertension (401 9) (I10)   2  Hyperlipidemia (272 4) (E78 5)   3  Impaired fasting glucose (790 21) (R73 01)   4  Benign prostatic hyperplasia (600 00) (N40 0)   5  Osteoarthritis of both knees (715 96) (M17 0)    Plan  Hypertension    · Follow-up visit in 3 months Evaluation and Treatment  Follow-up  Status: Complete  Done: 66AYU0498  Need for prophylactic vaccination and inoculation against influenza    · Stop: Fluzone High-Dose 0 5 ML Intramuscular Suspension Prefilled Syringe  Nocturia    · 1 - Dagoberto NEWELL, Linnette Whitney  (Urology) Physician Referral  Consult Only: the expectation is that the  referring provider will communicate back to the patient on treatment options  Evaluation and  Treatment: the expectation is that the referred to provider will communicate back to the patient  on treatment options  Status: Active  Requested for: 20Jan2017  Care Summary provided  : Yes  PMH: Postoperative anemia due to acute blood loss    · Iron 325 (65 Fe) MG Oral Tablet    Discussion/Summary  Discussion Summary:   The patient is doing generally well  His blood pressure is well controlled  His lipid status is satisfactory  His glucose metabolism has been stable  His BPH symptoms have improved on tamsulosin  His arthritic symptoms are improved since his surgery  He will maintain his current medications  He will return here for follow-up in 3 months  Chief Complaint  Chief Complaint Free Text Note Form: 6 week f/u for hypertensionASA 325mg BID until 10/17/2017 s/p R TKRthe flu vaccine, states cannot tolerate vaccine      History of Present Illness  HPI: The patient returned to the office re-evaluation of hypertension, hyperlipidemia, impaired fasting glucose, and osteoarthritis  Since his last visit he underwent bilateral total knee replacements  He tolerated surgery well and is making a good recovery  He will be returning to work later this week  Otherwise, he is doing well   He has had no chest pain, shortness of breath, palpitations, or dizziness  He is tolerating his medications well  Review of Systems  Complete-Male:   Constitutional: No fever or chills, feels well, no tiredness, no recent weight gain or weight loss  Eyes: No complaints of eye pain, no red eyes, no discharge from eyes, no itchy eyes  ENT: no complaints of earache, no hearing loss, no nosebleeds, no nasal discharge, no sore throat, no hoarseness  Cardiovascular: as noted in HPI  Respiratory: as noted in HPI  Gastrointestinal: No complaints of abdominal pain, no constipation, no nausea or vomiting, no diarrhea or bloody stools  Genitourinary: as noted in HPI  Musculoskeletal: as noted in HPI  Neurological: No compliants of headache, no confusion, no convulsions, no numbness or tingling, no dizziness or fainting, no limb weakness, no difficulty walking  Psychiatric: Is not suicidal, no sleep disturbances, no anxiety or depression, no change in personality, no emotional problems  Endocrine: no muscle weakness  Hematologic/Lymphatic: no tendency for easy bruising  Active Problems  1  Benign prostatic hyperplasia (600 00) (N40 0)   2  Cataract (366 9) (H26 9)   3  Glaucoma (365 9) (H40 9)   4  History of total left knee replacement (V43 65) (Z96 652)   5  Hyperlipidemia (272 4) (E78 5)   6  Hypertension (401 9) (I10)   7  Impaired fasting glucose (790 21) (R73 01)   8  Leg edema (782 3) (R60 0)   9  Nocturia (788 43) (R35 1)   10  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)   11  Osteoarthritis of both knees (715 96) (M17 0)   12  Premature ventricular contraction (427 69) (I49 3)   13  Screen for colon cancer (V76 51) (Z12 11)   14  Screening for diabetes mellitus (V77 1) (Z13 1)   15  Screening for genitourinary condition (V81 6) (Z13 89)   16  Sebaceous cyst (706 2) (L72 3)   17  Skin lesion (709 9) (L98 9)   18  Vitreous detachment of left eye (379 21) (B54 016)    Past Medical History  1   History of Hearing Loss (389 9)  Active Problems And Past Medical History Reviewed: The active problems and past medical history were reviewed and updated today  Surgical History  1  History of Cataract Surgery   2  History of Complete Colonoscopy    Family History  Mother    1  Family history of Diabetes Mellitus (V18 0)  Father    2  Family history of Chronic Obstructive Pulmonary Disease   3  Family history of Gastric Cancer (V16 0)  Paternal Uncle    4  Family history of Gastric Cancer (V16 0)  Family History    5  Family history of Breast Cancer (V16 3)   6  Family history of Thyroid Cancer  Family History Reviewed: The family history was reviewed and updated today  Social History   · Never a smoker   · Never Drank Alcohol  Social History Reviewed: The social history was reviewed and is unchanged  Current Meds   1  AmLODIPine Besylate 5 MG Oral Tablet; take 1 tablet by mouth daily; Therapy: 21OWZ9878 to (Last Rx:18Aox7930)  Requested for: 17Ujx9280 Ordered   2  HydroCHLOROthiazide 25 MG Oral Tablet; take one tablet by mouth every day; Therapy: 57CPZ7450 to (Last Rx:80Bex9276)  Requested for: 47Ajf1505 Ordered   3  Iron 325 (65 Fe) MG Oral Tablet; TAKE 1 TABLET TWICE DAILY AS DIRECTED; Therapy: 92Zvq7233 to (96 [phone]) Recorded   4  Latanoprost 0 005 % Ophthalmic Solution; INSTILL 1 DROP INTO LEFT EYE AT BEDTIME; Therapy: (BKXPKUVV:38IFD2112) to Recorded   5  Losartan Potassium 100 MG Oral Tablet; take one tablet by mouth every day; Therapy: 99VWT4317 to (Last Rx:76Kbs3923)  Requested for: 29Peh1857 Ordered   6  Metoprolol Succinate ER 50 MG Oral Tablet Extended Release 24 Hour; TAKE 1 TABLET DAILY; Therapy: 36DMZ0659 to (Evaluate:55Vfj1353)  Requested for: 36ZOU3428; Last Rx:19Xop2617   Ordered   7  Multi-Vitamin TABS; TAKE 1 TABLET DAILY; Therapy: ([id number]) to Recorded   8  Spironolactone 25 MG Oral Tablet; take one tablet by mouth daily;    Therapy: 97SAH1613 to (Last Rx: 72PUA1527)  Requested for: 82Alz7918 Ordered   9  Tamsulosin HCl - 0 4 MG Oral Capsule; TAKE 1 CAPSULE AT BEDTIME; Therapy: 36SPD3879 to ([phone])  Requested for: 72RIE3812; Last Rx:81Dmh2813   Ordered   10  TraMADol HCl - 50 MG Oral Tablet; TAKE 1 TABLET Bedtime; Therapy: 06Vmm7862 to Recorded    Allergies  1  CeleBREX CAPS    Vitals  Vital Signs    Recorded: 70IQH0508 01:14PM   Temperature 97 3 F   Heart Rate 68   Respiration 12   Systolic 548   Diastolic 72   Height 5 ft 9 in   Weight 239 lb 4 oz   BMI Calculated 35 33   BSA Calculated 2 23     Physical Exam    Constitutional   General appearance: No acute distress, well appearing and well nourished  Pulmonary   Respiratory effort: No increased work of breathing or signs of respiratory distress  Auscultation of lungs: Clear to auscultation, equal breath sounds bilaterally, no wheezes, no rales, no rhonci  Cardiovascular   Auscultation of heart: Normal rate and rhythm, normal S1 and S2, without murmurs  Examination of extremities for edema and/or varicosities: Abnormal  -- +1 edema is noted  Carotid pulses: Normal     Abdomen   Abdomen: Non-tender, no masses  Liver and spleen: No hepatomegaly or splenomegaly  Lymphatic   Palpation of lymph nodes in neck: No lymphadenopathy  Musculoskeletal   Gait and station: Normal     Inspection/palpation of joints, bones, and muscles: Normal     Psychiatric   Orientation to person, place and time: Normal     Mood and affect: Normal          Future Appointments    Date/Time Provider Specialty Site   01/15/2018 03:30 PM ELISE Padilla   Internal Medicine [facility]     Signatures   Electronically signed by : Carlotta Felty, M D ; Oct 17 2017 11:56AM EST                       (Author)

## 2017-10-20 ENCOUNTER — APPOINTMENT (OUTPATIENT)
Dept: PHYSICAL THERAPY | Facility: MEDICAL CENTER | Age: 67
End: 2017-10-20
Payer: COMMERCIAL

## 2017-10-20 PROCEDURE — 97140 MANUAL THERAPY 1/> REGIONS: CPT

## 2017-10-20 PROCEDURE — 97530 THERAPEUTIC ACTIVITIES: CPT

## 2017-10-20 PROCEDURE — 97110 THERAPEUTIC EXERCISES: CPT

## 2017-10-23 ENCOUNTER — APPOINTMENT (OUTPATIENT)
Dept: PHYSICAL THERAPY | Facility: MEDICAL CENTER | Age: 67
End: 2017-10-23
Payer: COMMERCIAL

## 2017-10-23 PROCEDURE — 97140 MANUAL THERAPY 1/> REGIONS: CPT

## 2017-10-23 PROCEDURE — 97110 THERAPEUTIC EXERCISES: CPT

## 2017-10-23 PROCEDURE — 97112 NEUROMUSCULAR REEDUCATION: CPT

## 2017-10-25 ENCOUNTER — APPOINTMENT (OUTPATIENT)
Dept: PHYSICAL THERAPY | Facility: MEDICAL CENTER | Age: 67
End: 2017-10-25
Payer: COMMERCIAL

## 2017-10-25 PROCEDURE — 97530 THERAPEUTIC ACTIVITIES: CPT

## 2017-10-25 PROCEDURE — 97112 NEUROMUSCULAR REEDUCATION: CPT

## 2017-10-25 PROCEDURE — 97140 MANUAL THERAPY 1/> REGIONS: CPT

## 2017-10-27 ENCOUNTER — APPOINTMENT (OUTPATIENT)
Dept: PHYSICAL THERAPY | Facility: MEDICAL CENTER | Age: 67
End: 2017-10-27
Payer: COMMERCIAL

## 2017-10-27 PROCEDURE — 97110 THERAPEUTIC EXERCISES: CPT

## 2017-10-27 PROCEDURE — 97530 THERAPEUTIC ACTIVITIES: CPT

## 2017-10-27 PROCEDURE — 97112 NEUROMUSCULAR REEDUCATION: CPT

## 2017-11-07 ENCOUNTER — APPOINTMENT (OUTPATIENT)
Dept: PHYSICAL THERAPY | Facility: MEDICAL CENTER | Age: 67
End: 2017-11-07
Payer: COMMERCIAL

## 2017-11-07 PROCEDURE — 97110 THERAPEUTIC EXERCISES: CPT

## 2017-11-07 PROCEDURE — 97140 MANUAL THERAPY 1/> REGIONS: CPT

## 2017-11-07 PROCEDURE — 97530 THERAPEUTIC ACTIVITIES: CPT

## 2017-11-08 ENCOUNTER — HOSPITAL ENCOUNTER (OUTPATIENT)
Dept: NON INVASIVE DIAGNOSTICS | Facility: HOSPITAL | Age: 67
Discharge: HOME | End: 2017-11-08
Attending: INTERNAL MEDICINE
Payer: COMMERCIAL

## 2017-11-08 DIAGNOSIS — M79.669 PAIN OF LOWER LEG: ICD-10-CM

## 2017-11-08 PROCEDURE — 93971 EXTREMITY STUDY: CPT

## 2017-11-09 ENCOUNTER — APPOINTMENT (OUTPATIENT)
Dept: PHYSICAL THERAPY | Facility: MEDICAL CENTER | Age: 67
End: 2017-11-09
Payer: COMMERCIAL

## 2017-11-09 PROCEDURE — 97110 THERAPEUTIC EXERCISES: CPT

## 2017-11-09 PROCEDURE — 97112 NEUROMUSCULAR REEDUCATION: CPT

## 2017-11-09 PROCEDURE — 97140 MANUAL THERAPY 1/> REGIONS: CPT

## 2017-11-13 ENCOUNTER — APPOINTMENT (OUTPATIENT)
Dept: PHYSICAL THERAPY | Facility: MEDICAL CENTER | Age: 67
End: 2017-11-13
Payer: COMMERCIAL

## 2017-11-13 PROCEDURE — 97140 MANUAL THERAPY 1/> REGIONS: CPT

## 2017-11-13 PROCEDURE — 97112 NEUROMUSCULAR REEDUCATION: CPT

## 2017-11-13 PROCEDURE — 97530 THERAPEUTIC ACTIVITIES: CPT

## 2017-11-16 ENCOUNTER — APPOINTMENT (OUTPATIENT)
Dept: PHYSICAL THERAPY | Facility: MEDICAL CENTER | Age: 67
End: 2017-11-16
Payer: COMMERCIAL

## 2017-11-16 PROCEDURE — 97112 NEUROMUSCULAR REEDUCATION: CPT

## 2017-11-16 PROCEDURE — 97530 THERAPEUTIC ACTIVITIES: CPT

## 2017-11-16 PROCEDURE — 97110 THERAPEUTIC EXERCISES: CPT

## 2017-11-16 PROCEDURE — 97140 MANUAL THERAPY 1/> REGIONS: CPT

## 2017-11-21 ENCOUNTER — APPOINTMENT (OUTPATIENT)
Dept: PHYSICAL THERAPY | Facility: MEDICAL CENTER | Age: 67
End: 2017-11-21
Payer: COMMERCIAL

## 2017-11-21 PROCEDURE — 97110 THERAPEUTIC EXERCISES: CPT

## 2017-11-21 PROCEDURE — 97140 MANUAL THERAPY 1/> REGIONS: CPT

## 2017-11-21 PROCEDURE — 97010 HOT OR COLD PACKS THERAPY: CPT

## 2017-11-27 ENCOUNTER — APPOINTMENT (OUTPATIENT)
Dept: PHYSICAL THERAPY | Facility: MEDICAL CENTER | Age: 67
End: 2017-11-27
Payer: COMMERCIAL

## 2017-12-14 ENCOUNTER — GENERIC CONVERSION - ENCOUNTER (OUTPATIENT)
Dept: OTHER | Facility: OTHER | Age: 67
End: 2017-12-14

## 2017-12-14 ENCOUNTER — GENERIC CONVERSION - ENCOUNTER (OUTPATIENT)
Dept: INTERNAL MEDICINE CLINIC | Facility: CLINIC | Age: 67
End: 2017-12-14

## 2018-01-11 NOTE — MISCELLANEOUS
Message     Date: 26 Aug 2016 3:47 PM EST, Recorded By: Analy Chua For: RupaliTheron   Caller: Liza Tilley, Ryan   Phone: [phone] (Home), [phone] (Work)   Reason: Medical Complaint   having a problem with leg swelling since amlodipine was increased to 5 mg BId, since yesterday red rash on ankles  instructed to decrease Amlodipine to 5 mg daily, monitor blood pressure, wcb next week with progress report  following with eye doctor concerning pressure behind eye to see retinal specialist next week  Active Problems    1  Arthralgia of knee, left (719 46) (M25 562)   2  Cataract (366 9) (H26 9)   3  Cough (786 2) (R05)   4  Glaucoma (365 9) (H40 9)   5  Hyperlipidemia (272 4) (E78 5)   6  Hypertension (401 9) (I10)   7  Impaired fasting glucose (790 21) (R73 01)   8  Premature ventricular contraction (427 69) (I49 3)   9  Screen for colon cancer (V76 51) (Z12 11)   10  Screening for diabetes mellitus (V77 1) (Z13 1)   11  Sebaceous cyst (706 2) (L72 3)   12  Skin lesion (709 9) (L98 9)   13  Vitreous detachment of left eye (379 21) (H43 812)    Current Meds   1  AmLODIPine Besylate 5 MG Oral Tablet; Take 1 tablet daily; Therapy: 30IYP2454 to (Evaluate:40Gcp6822)  Requested for: 52Anf3750 Recorded   2  Hydrochlorothiazide 25 MG Oral Tablet; take one tablet by mouth every day; Therapy: 35ADK1927 to (Last Rx:40Lns8230)  Requested for: 67PXQ9635 Ordered   3  Losartan Potassium 100 MG Oral Tablet; take one tablet by mouth every day; Therapy: 03XXH3398 to (Last Rx:45Mdz0519)  Requested for: 84JFS0903 Ordered   4  Multi-Vitamin TABS; TAKE 1 TABLET DAILY; Therapy: ([id number]) to Recorded   5  Travatan Z 0 004 % Ophthalmic Solution; 1 drop left eye at bedtime; Therapy: (Recorded:92Nur3837) to Recorded    Allergies    1   No Known Drug Allergies    Signatures   Electronically signed by : ELISE Watters ; Aug 29 2016  1:25PM EST                       (Author)

## 2018-01-11 NOTE — RESULT NOTES
Message   Recorded as Task   Date: 01/20/2017 04:20 PM, Created By: System   Task Name: Schedule Appointment   Assigned To: Nika Salcido   Regarding Patient: Lauryn Del Valle, Status: In Progress   Comment:    System - 20 Jan 2017 4:20 PM     Preferred Communication: Mail  Ordering Site: [facility]    Referred To: Bruce Arauz MD, Christina Manriquez  Urology  To Be Done: 20 Jan 2017   [facility] - 20 Jan 2017 4:49 PM     TASK IN PROGRESS   Lidia Burns - 23 Jan 2017 10:12 AM     TASK EDITED  lvm @ [id number]   Lidia Burns - 27 Jan 2017 4:24 PM     TASK EDITED  lvm @ [id number]   John Hamilton - 30 Jan 2017 2:27 PM     TASK EDITED  [id number]   John Hamilton - 06 Feb 2017 12:44 PM     TASK EDITED  3 attempts made, no appt scheduled  Please FU w/ pt and have pt call office directly to schedule        Plan  Nocturia    · 1 - Dagoberto NEWELL, Christina Manriquez  (Urology) Physician Referral  Consult Only: the expectation is  that the referring provider will communicate back to the patient on treatment options  Evaluation and Treatment: the expectation is that the referred to provider will  communicate back to the patient on treatment options    Status: Active  Requested for:  20Jan2017  Care Summary provided  : Yes

## 2018-01-13 VITALS
HEART RATE: 68 BPM | WEIGHT: 239.25 LBS | DIASTOLIC BLOOD PRESSURE: 72 MMHG | SYSTOLIC BLOOD PRESSURE: 156 MMHG | BODY MASS INDEX: 35.44 KG/M2 | HEIGHT: 69 IN | TEMPERATURE: 97.3 F | RESPIRATION RATE: 12 BRPM

## 2018-01-13 VITALS
BODY MASS INDEX: 35.03 KG/M2 | RESPIRATION RATE: 12 BRPM | TEMPERATURE: 97.8 F | WEIGHT: 236.5 LBS | HEART RATE: 68 BPM | SYSTOLIC BLOOD PRESSURE: 138 MMHG | HEIGHT: 69 IN | DIASTOLIC BLOOD PRESSURE: 78 MMHG

## 2018-01-13 NOTE — RESULT NOTES
Message   Recorded as Task   Date: 10/12/2017 11:24 AM, Created By: Robinson Antoine   Task Name: Follow Up   Assigned To: Eda Seip   Regarding Patient: Maureen Chadwick, Status: In Progress   Comment:    Elizabeth Burleson - 12 Oct 2017 11:24 AM     TASK CREATED  blood count was good  Brewster Chivo - 12 Oct 2017 1:32 PM     TASK EDITED  I left a message for Lauren Bessemer to call back  Gallardo Chivo - 12 Oct 2017 1:32 PM     TASK IN PROGRESS   Gallardo Chivo - 23 Oct 2017 9:45 AM     TASK EDITED  2nd message left for Lauren Bessemer to call back  Gallardo Chivo - 24 Oct 2017 1:12 PM     TASK EDITED  I spoke with Lauren Bessemer and he is aware of his results  Verified Results  (1) CBC/PLT/DIFF 11Oct2017 11:57AM Robinson Antoine   TW Order Number: [id number]     Test Name Result Flag Reference   WBC COUNT 8 45 Thousand/uL  4 31-10 16   RBC COUNT 3 92 Million/uL  3 88-5 62   HEMOGLOBIN 12 2 g/dL  12 0-17 0   HEMATOCRIT 36 3 % L 36 5-49 3   MCV 93 fL  82-98   MCH 31 1 pg  26 8-34 3   MCHC 33 6 g/dL  31 4-37 4   RDW 12 8 %  11 6-15 1   MPV 11 5 fL  8 9-12 7   PLATELET COUNT 594 Thousands/uL  149-390   nRBC AUTOMATED 0 /100 WBCs     NEUTROPHILS RELATIVE PERCENT 63 %  43-75   LYMPHOCYTES RELATIVE PERCENT 25 %  14-44   MONOCYTES RELATIVE PERCENT 9 %  4-12   EOSINOPHILS RELATIVE PERCENT 2 %  0-6   BASOPHILS RELATIVE PERCENT 1 %  0-1   NEUTROPHILS ABSOLUTE COUNT 5 38 Thousands/? ??L  1 85-7 62   LYMPHOCYTES ABSOLUTE COUNT 2 08 Thousands/? ??L  0 60-4 47   MONOCYTES ABSOLUTE COUNT 0 77 Thousand/? ??L  0 17-1 22   EOSINOPHILS ABSOLUTE COUNT 0 16 Thousand/? ??L  0 00-0 61   BASOPHILS ABSOLUTE COUNT 0 04 Thousands/? ??L  0 00-0 10

## 2018-01-14 VITALS
WEIGHT: 233.5 LBS | SYSTOLIC BLOOD PRESSURE: 148 MMHG | DIASTOLIC BLOOD PRESSURE: 82 MMHG | HEIGHT: 69 IN | HEART RATE: 68 BPM | RESPIRATION RATE: 16 BRPM | TEMPERATURE: 97.5 F | BODY MASS INDEX: 34.58 KG/M2

## 2018-01-15 ENCOUNTER — ALLSCRIPTS OFFICE VISIT (OUTPATIENT)
Dept: OTHER | Facility: OTHER | Age: 68
End: 2018-01-15

## 2018-01-15 VITALS
HEART RATE: 60 BPM | TEMPERATURE: 96.8 F | DIASTOLIC BLOOD PRESSURE: 84 MMHG | HEIGHT: 69 IN | WEIGHT: 236.5 LBS | SYSTOLIC BLOOD PRESSURE: 140 MMHG | BODY MASS INDEX: 35.03 KG/M2 | RESPIRATION RATE: 12 BRPM

## 2018-01-15 NOTE — MISCELLANEOUS
Chief Complaint  Chief Complaint Free Text Note Form: no appointment scheduled, I called patient twice but no response  no d/c summary      History of Present Illness  TCM Communication [facility]: The patient is being contacted for no appointment scheduled  He was hospitalized [facility]  The date of admission: 9/5/2017, date of discharge: 9/6/2017  Diagnosis: right TKR  He was discharged to home  Medications were not reviewed today  He did not schedule a follow up appointment  Follow-up appointments with other specialists: ortho  Communication performed and completed by Dee Bloch A Ronemus      Active Problems    1  Arthralgia of knee, left (719 46) (M25 562)   2  Benign prostatic hyperplasia (600 00) (N40 0)   3  Cataract (366 9) (H26 9)   4  Glaucoma (365 9) (H40 9)   5  History of total left knee replacement (V43 65) (Z96 652)   6  Hyperlipidemia (272 4) (E78 5)   7  Hypertension (401 9) (I10)   8  Impaired fasting glucose (790 21) (R73 01)   9  Leg edema (782 3) (R60 0)   10  Nocturia (788 43) (R35 1)   11  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)   12  Osteoarthritis of both knees (715 96) (M17 0)   13  Postoperative anemia due to acute blood loss (285 1) (D62)   14  Premature ventricular contraction (427 69) (I49 3)   15  Screen for colon cancer (V76 51) (Z12 11)   16  Screening for diabetes mellitus (V77 1) (Z13 1)   17  Screening for genitourinary condition (V81 6) (Z13 89)   18  Sebaceous cyst (706 2) (L72 3)   19  Skin lesion (709 9) (L98 9)   20  Vitreous detachment of left eye (379 21) (K15 270)    Past Medical History    1  History of Hearing Loss (389 9)    Surgical History    1  History of Cataract Surgery   2  History of Complete Colonoscopy    Family History  Mother    1  Family history of Diabetes Mellitus (V18 0)  Father    2  Family history of Chronic Obstructive Pulmonary Disease   3  Family history of Gastric Cancer (V16 0)  Paternal Uncle    4   Family history of Gastric Cancer (V16 0)  Family History    5  Family history of Breast Cancer (V16 3)   6  Family history of Thyroid Cancer    Social History    · Never a smoker   · Never Drank Alcohol    Current Meds   1  AmLODIPine Besylate 5 MG Oral Tablet; take 1 tablet by mouth daily; Therapy: 19ZAG6782 to (Last Rx:33Ryn2987)  Requested for: 06Enn7384 Ordered   2  HydroCHLOROthiazide 25 MG Oral Tablet; take one tablet by mouth every day; Therapy: 79YGX7122 to (Last Rx:41Wvl8515)  Requested for: 97Ghp6629 Ordered   3  Iron 325 (65 Fe) MG Oral Tablet; TAKE 1 TABLET TWICE DAILY AS DIRECTED; Therapy: 63Zis5388 to (768 478 173) Recorded   4  Latanoprost 0 005 % Ophthalmic Solution; INSTILL 1 DROP INTO LEFT EYE AT   BEDTIME; Therapy: (FTRWLIXR:95ALN5624) to Recorded   5  Losartan Potassium 100 MG Oral Tablet; take one tablet by mouth every day; Therapy: 79EDV7277 to (Last Rx:65Dpi4678)  Requested for: 62Uzy4997 Ordered   6  Metoprolol Succinate ER 50 MG Oral Tablet Extended Release 24 Hour; TAKE 1 TABLET   DAILY; Therapy: 92NKN0244 to (Evaluate:86Ula1291)  Requested for: 82THM7423; Last   Rx:39Veh0181 Ordered   7  Multi-Vitamin TABS; TAKE 1 TABLET DAILY; Therapy: (Maik Hubbard) to Recorded   8  Spironolactone 25 MG Oral Tablet; take one tablet by mouth daily; Therapy: 42NVH1116 to (Last Rx:92Sje8660)  Requested for: 10Iar8116 Ordered   9  Tamsulosin HCl - 0 4 MG Oral Capsule; TAKE 1 CAPSULE AT BEDTIME; Therapy: 55CYY0244 to (Nay Barney)  Requested for: 05XKO3816; Last   Rx:43Utv3144 Ordered   10  TraMADol HCl - 50 MG Oral Tablet; TAKE 1 TABLET Bedtime; Therapy: 35Nbz0793 to Recorded    Allergies    1  CeleBREX CAPS    Future Appointments    Date/Time Provider Specialty Site   10/16/2017 01:15 PM [facility]   [facility]     Signatures   Electronically signed by : ELISE Hammond ; Sep 13 2017 12:50PM EST                       (Author)

## 2018-01-15 NOTE — MISCELLANEOUS
Provider Comments  Provider Comments:   Patient NO SHOW on 3/11/2016 at 3:30pm letter sent to patient      Signatures   Electronically signed by : ELISE Morrissey ; Mar 16 2016 10:42AM EST                       (Author)

## 2018-01-17 NOTE — MISCELLANEOUS
Chief Complaint  Chief Complaint Free Text Note Form: unable to reach patient by phone, no NANCY scheduled, no discharge summmary      History of Present Illness  TCM Communication [facility]: The patient is being contacted for no appointment scheduled  He was hospitalized at and [facility]  The date of admission: 7/14/2017, date of discharge: 7/16/2017  Diagnosis: left TKR  He was discharged to home  Medications were not reviewed today  He did not schedule a follow up appointment  Follow-up appointments with other specialists: ortho, Dr Jg Holman  Communication performed and completed by Candis Graves      Active Problems    1  Arthralgia of knee, left (719 46) (M25 562)   2  Benign prostatic hyperplasia (600 00) (N40 0)   3  Cataract (366 9) (H26 9)   4  Glaucoma (365 9) (H40 9)   5  Hyperlipidemia (272 4) (E78 5)   6  Hypertension (401 9) (I10)   7  Impaired fasting glucose (790 21) (R73 01)   8  Leg edema (782 3) (R60 0)   9  Nocturia (788 43) (R35 1)   10  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)   11  Premature ventricular contraction (427 69) (I49 3)   12  Primary osteoarthritis of left knee (715 16) (M17 12)   13  Screen for colon cancer (V76 51) (Z12 11)   14  Screening for diabetes mellitus (V77 1) (Z13 1)   15  Screening for genitourinary condition (V81 6) (Z13 89)   16  Sebaceous cyst (706 2) (L72 3)   17  Skin lesion (709 9) (L98 9)   18  Vitreous detachment of left eye (379 21) (H88 534)    Past Medical History    1  History of Hearing Loss (389 9)    Surgical History    1  History of Cataract Surgery   2  History of Complete Colonoscopy    Family History  Mother    1  Family history of Diabetes Mellitus (V18 0)  Father    2  Family history of Chronic Obstructive Pulmonary Disease   3  Family history of Gastric Cancer (V16 0)  Paternal Uncle    4  Family history of Gastric Cancer (V16 0)  Family History    5  Family history of Breast Cancer (V16 3)   6   Family history of Thyroid Cancer    Social History    · Never a smoker   · Never Drank Alcohol    Current Meds   1  AmLODIPine Besylate 5 MG Oral Tablet; take 1 tablet by mouth twice daily; Therapy: 28RID1928 to (Last YQ:76BEB9292)  Requested for: 57ZFR1185 Ordered   2  HydroCHLOROthiazide 25 MG Oral Tablet; take one tablet by mouth every day; Therapy: 03OEF1330 to (Last Rx:11Xby3032)  Requested for: 25KKD2068 Ordered   3  Latanoprost 0 005 % Ophthalmic Solution; INSTILL 1 DROP INTO LEFT EYE AT   BEDTIME; Therapy: (NWNQBLAJ:97MVO9572) to Recorded   4  Losartan Potassium 100 MG Oral Tablet; take one tablet by mouth every day; Therapy: 47YXL8591 to (Last Rx:04Teq4705)  Requested for: 96Mce1093 Ordered   5  Metoprolol Succinate ER 50 MG Oral Tablet Extended Release 24 Hour; TAKE 1 TABLET   DAILY; Therapy: 75QUT2597 to (Evaluate:93Ozs4677)  Requested for: 59KXY8325; Last   Rx:74Swz0302 Ordered   6  Multi-Vitamin TABS; TAKE 1 TABLET DAILY; Therapy: (Lavelle Ngo) to Recorded   7  Spironolactone 25 MG Oral Tablet; take one tablet by mouth every day; Therapy: 48YSH7871 to (Last Rx:09Jun2017)  Requested for: 05XOR4417 Ordered   8  Tamsulosin HCl - 0 4 MG Oral Capsule; TAKE 1 CAPSULE AT BEDTIME; Therapy: 94BKS4211 to (Evaluate:52Jfx3554)  Requested for: 65SVV8840; Last   Rx:67Mts1708 Ordered    Allergies    1  No Known Drug Allergies    Message   Recorded as Task   Date: 07/16/2017 02:51 PM, Created By: System   Task Name: [facility] NANCY   Assigned To: Carrie Graves   Regarding Patient: Kassidy Galeano, Status:  In Progress   Comment:    System - 16 Jul 2017 2:51 PM     Patient discharged from hospital   Patient Name: Enrique Bar  Patient YOB: 1950  Discharge Date: 7/16/2017  Facility: [facility] - 17 Jul 2017 7:35 AM     TASK REASSIGNED: Previously Assigned To Theron Zapien Mary - 17 Jul 2017 10:39 AM     TASK EDITED  left message for patient to call the office   Carrie Graves - 17 Jul 2017 10:39 AM TASK IN PROGRESS     Signatures   Electronically signed by : ELISE Butcher ; Jul 20 2017 11:13AM EST                       (Author)

## 2018-01-17 NOTE — PROGRESS NOTES
Assessment   1  Hypertension (401 9) (I10)   2  Hyperlipidemia (272 4) (E78 5)   3  Benign prostatic hyperplasia (600 00) (N40 0)   4  Impaired fasting glucose (790 21) (R73 01)    Plan   Hyperlipidemia, Hypertension, Impaired fasting glucose    · (1) HEMOGLOBIN A1C; Status:Active; Requested for:02Apr2018;    · (1) LIPID PANEL, FASTING; Status:Active; Requested for:02Apr2018;    · Follow-up visit in 3 months Evaluation and Treatment  Follow-up  Status: Complete  Done: [id number]  Hypertension    · Metoprolol Succinate ER 50 MG Oral Tablet Extended Release 24 Hour; TAKE 1 TABLET    DAILY  PMH: Screening for genitourinary condition    · *VB - Urinary Incontinence Screen (Dx Z13 89 Screen for UI); Status:Complete;   Done: [id number]    03:50PM    Discussion/Summary   Discussion Summary:    The patient is doing generally well  His blood pressure is well controlled  His lipid status is favorable  His glucose metabolism has been stable  He is hopeful to start exercising more regularly in the near future  He is going to try to lose some weight  We elected to continue his current medications for now  Hopefully, with exercise and weight loss is edema will improve  He will be seen back in 3 months to reassess this  If it does not improve, his diuretic therapy may need to be intensified  Chief Complaint   Chief Complaint Free Text Note Form: 3 month f/u for hypertension to take Celebrex with PPI, taken off allergy list UI falls in the past year      History of Present Illness   HPI: The patient returned to the office for re-evaluation of hypertension, hyperlipidemia, impaired fasting glucose, and BPH  Since his last visit he has been feeling well  He has had no chest pain, shortness of breath, palpitations, or dizziness  He is well satisfied with the results of his knee replacements  He does have some leg edema which has been persistent postoperatively  He has not been exercising much because of the weather   He is tolerating his medications well  Review of Systems   Complete-Male:      Constitutional: No fever or chills, feels well, no tiredness, no recent weight gain or weight loss  Eyes: No complaints of eye pain, no red eyes, no discharge from eyes, no itchy eyes  ENT: no complaints of earache, no hearing loss, no nosebleeds, no nasal discharge, no sore throat, no hoarseness  Cardiovascular: as noted in HPI  Respiratory: No complaints of shortness of breath, no wheezing, no cough, no SOB on exertion, no orthopnea or PND  Gastrointestinal: No complaints of abdominal pain, no constipation, no nausea or vomiting, no diarrhea or bloody stools  Genitourinary: as noted in HPI  Musculoskeletal: as noted in HPI  Neurological: No compliants of headache, no confusion, no convulsions, no numbness or tingling, no dizziness or fainting, no limb weakness, no difficulty walking  Psychiatric: Is not suicidal, no sleep disturbances, no anxiety or depression, no change in personality, no emotional problems  Endocrine: no muscle weakness  Hematologic/Lymphatic: no tendency for easy bruising  Active Problems   1  Benign prostatic hyperplasia (600 00) (N40 0)   2  Cataract (366 9) (H26 9)   3  Glaucoma (365 9) (H40 9)   4  Hyperlipidemia (272 4) (E78 5)   5  Hypertension (401 9) (I10)   6  Impaired fasting glucose (790 21) (R73 01)   7  Leg edema (782 3) (R60 0)   8  Nocturia (788 43) (R35 1)   9  Osteoarthritis of both knees (715 96) (M17 0)   10  Premature ventricular contraction (427 69) (I49 3)   11  Vitreous detachment of left eye (379 21) (S10 184)    Past Medical History   1  History of Hearing Loss (389 9)  Active Problems And Past Medical History Reviewed: The active problems and past medical history were reviewed and updated today  Surgical History   1  History of Cataract Surgery   2  History of Complete Colonoscopy   3   History of Knee Replacement    Family History   Mother    1  Family history of Diabetes Mellitus (V18 0)  Father    2  Family history of Chronic Obstructive Pulmonary Disease   3  Family history of Gastric Cancer (V16 0)  Paternal Uncle    4  Family history of Gastric Cancer (V16 0)  Family History    5  Family history of Breast Cancer (V16 3)   6  Family history of Thyroid Cancer  Family History Reviewed: The family history was reviewed and updated today  Social History    · Never a smoker   · Never Drank Alcohol  Social History Reviewed: The social history was reviewed and is unchanged  Current Meds    1  AmLODIPine Besylate 5 MG Oral Tablet; take 1 tablet by mouth daily; Therapy: 80FRS7821 to (Last Rx:22Rdh5679)  Requested for: 52Buf6929 Ordered   2  HydroCHLOROthiazide 25 MG Oral Tablet; take one tablet by mouth daily; Therapy: 10QND1368 to (Last Rx:17Nov2017)  Requested for: 02PQB3036 Ordered   3  Latanoprost 0 005 % Ophthalmic Solution; INSTILL 1 DROP INTO LEFT EYE AT BEDTIME; Therapy: (FZIBYRZA:02SBT0321) to Recorded   4  Losartan Potassium 100 MG Oral Tablet; take one tablet by mouth every day; Therapy: 11GRC7266 to (Last Rx:20Qkr2094)  Requested for: 30Hqg6207 Ordered   5  Metoprolol Succinate ER 50 MG Oral Tablet Extended Release 24 Hour; TAKE 1 TABLET DAILY; Therapy: 84HKI8357 to (Evaluate:69Mhq7118)  Requested for: 03UJS0059; Last Rx:54Vnn2217     Ordered   6  Multi-Vitamin TABS; TAKE 1 TABLET DAILY; Therapy: (Recorded:15Jan2018) to Recorded   7  Spironolactone 25 MG Oral Tablet; take one tablet by mouth daily; Therapy: 53ZUR0770 to (Evaluate:13Jun2018)  Requested for: 48ALP8528; Last Rx:50Qdh0623;     Status: ACTIVE - Renewal Denied Ordered   8  Tamsulosin HCl - 0 4 MG Oral Capsule; TAKE 1 CAPSULE AT BEDTIME;      Therapy: 36DXJ4161 to (Evaluate:21Mar2018)  Requested for: 05TBC7263; Last Rx:42Yok5162     Ordered    Vitals   Vital Signs    Recorded: 37ROR0010 03:36PM   Temperature 97 2 F   Heart Rate 68   Respiration 15   Systolic 590   Diastolic 84   Height 5 ft 9 in   Weight 249 lb    BMI Calculated 36 77   BSA Calculated 2 27     Physical Exam        Constitutional      General appearance: No acute distress, well appearing and well nourished  Pulmonary      Respiratory effort: No increased work of breathing or signs of respiratory distress  Auscultation of lungs: Clear to auscultation, equal breath sounds bilaterally, no wheezes, no rales, no rhonci  Cardiovascular      Auscultation of heart: Normal rate and rhythm, normal S1 and S2, without murmurs  Examination of extremities for edema and/or varicosities: Abnormal  -- +2 edema is noted  Carotid pulses: Normal        Abdomen      Abdomen: Non-tender, no masses  Liver and spleen: No hepatomegaly or splenomegaly  Lymphatic      Palpation of lymph nodes in neck: No lymphadenopathy  Musculoskeletal      Gait and station: Normal        Inspection/palpation of joints, bones, and muscles: Normal        Psychiatric      Orientation to person, place and time: Normal        Mood and affect: Normal           Results/Data   *VB - Urinary Incontinence Screen (Dx Z13 89 Screen for UI) 72VLP3668 03:50PM Kathy Leal      Test Name Result Flag Reference   Urinary Incontinence Assessment 43WDH8683        Falls Risk Assessment (Dx Z13 89 Screen for Neurologic Disorder) 72CBF6307 03:48PM User, Alfa      Test Name Result Flag Reference   Falls Risk      No falls in the past year      (1) HEMOGLOBIN A1C 20Jun2017 09:16AM Pepper Closs      Test Name Result Flag Reference   HEMOGLOBIN A1C 6 2 %  4 2-6 3   EST  AVG   GLUCOSE 131 mg/dl          Signatures    Electronically signed by : ELISE Rankin ; Jan 16 2018 11:31AM EST                       (Author)

## 2018-01-22 VITALS
TEMPERATURE: 97.2 F | HEART RATE: 68 BPM | WEIGHT: 249 LBS | DIASTOLIC BLOOD PRESSURE: 84 MMHG | SYSTOLIC BLOOD PRESSURE: 160 MMHG | HEIGHT: 69 IN | BODY MASS INDEX: 36.88 KG/M2 | RESPIRATION RATE: 15 BRPM

## 2018-04-02 DIAGNOSIS — E78.5 HYPERLIPIDEMIA: ICD-10-CM

## 2018-04-02 DIAGNOSIS — I10 ESSENTIAL (PRIMARY) HYPERTENSION: ICD-10-CM

## 2018-04-02 DIAGNOSIS — R73.01 IMPAIRED FASTING GLUCOSE: ICD-10-CM

## 2018-04-03 DIAGNOSIS — N40.0 BPH (BENIGN PROSTATIC HYPERPLASIA): Primary | ICD-10-CM

## 2018-04-03 RX ORDER — TAMSULOSIN HYDROCHLORIDE 0.4 MG/1
CAPSULE ORAL
Qty: 90 CAPSULE | Refills: 0 | Status: SHIPPED | OUTPATIENT
Start: 2018-04-03 | End: 2018-07-05 | Stop reason: SDUPTHER

## 2018-04-13 ENCOUNTER — APPOINTMENT (OUTPATIENT)
Dept: LAB | Facility: MEDICAL CENTER | Age: 68
End: 2018-04-13
Payer: COMMERCIAL

## 2018-04-13 DIAGNOSIS — E78.5 HYPERLIPIDEMIA: ICD-10-CM

## 2018-04-13 DIAGNOSIS — R73.01 IMPAIRED FASTING GLUCOSE: ICD-10-CM

## 2018-04-13 DIAGNOSIS — I10 ESSENTIAL (PRIMARY) HYPERTENSION: ICD-10-CM

## 2018-04-13 LAB
CHOLEST SERPL-MCNC: 186 MG/DL (ref 50–200)
EST. AVERAGE GLUCOSE BLD GHB EST-MCNC: 134 MG/DL
HBA1C MFR BLD: 6.3 % (ref 4.2–6.3)
HDLC SERPL-MCNC: 27 MG/DL (ref 40–60)
NONHDLC SERPL-MCNC: 159 MG/DL
TRIGL SERPL-MCNC: 460 MG/DL

## 2018-04-13 PROCEDURE — 36415 COLL VENOUS BLD VENIPUNCTURE: CPT

## 2018-04-13 PROCEDURE — 83036 HEMOGLOBIN GLYCOSYLATED A1C: CPT

## 2018-04-13 PROCEDURE — 80061 LIPID PANEL: CPT

## 2018-04-17 ENCOUNTER — OFFICE VISIT (OUTPATIENT)
Dept: INTERNAL MEDICINE CLINIC | Facility: CLINIC | Age: 68
End: 2018-04-17
Payer: COMMERCIAL

## 2018-04-17 VITALS
TEMPERATURE: 97 F | HEIGHT: 69 IN | RESPIRATION RATE: 16 BRPM | WEIGHT: 248 LBS | DIASTOLIC BLOOD PRESSURE: 80 MMHG | BODY MASS INDEX: 36.73 KG/M2 | HEART RATE: 68 BPM | SYSTOLIC BLOOD PRESSURE: 134 MMHG

## 2018-04-17 DIAGNOSIS — R33.8 BENIGN PROSTATIC HYPERPLASIA WITH URINARY RETENTION: ICD-10-CM

## 2018-04-17 DIAGNOSIS — R73.01 IMPAIRED FASTING GLUCOSE: ICD-10-CM

## 2018-04-17 DIAGNOSIS — I10 ESSENTIAL HYPERTENSION: Primary | ICD-10-CM

## 2018-04-17 DIAGNOSIS — E78.5 HYPERLIPIDEMIA, UNSPECIFIED HYPERLIPIDEMIA TYPE: ICD-10-CM

## 2018-04-17 DIAGNOSIS — N40.1 BENIGN PROSTATIC HYPERPLASIA WITH URINARY RETENTION: ICD-10-CM

## 2018-04-17 PROBLEM — N40.0 BENIGN PROSTATIC HYPERPLASIA: Status: ACTIVE | Noted: 2017-07-03

## 2018-04-17 PROBLEM — R35.1 NOCTURIA: Status: ACTIVE | Noted: 2017-01-20

## 2018-04-17 PROCEDURE — 1101F PT FALLS ASSESS-DOCD LE1/YR: CPT | Performed by: INTERNAL MEDICINE

## 2018-04-17 PROCEDURE — 3079F DIAST BP 80-89 MM HG: CPT | Performed by: INTERNAL MEDICINE

## 2018-04-17 PROCEDURE — 99214 OFFICE O/P EST MOD 30 MIN: CPT | Performed by: INTERNAL MEDICINE

## 2018-04-17 PROCEDURE — 3008F BODY MASS INDEX DOCD: CPT | Performed by: INTERNAL MEDICINE

## 2018-04-17 PROCEDURE — 3075F SYST BP GE 130 - 139MM HG: CPT | Performed by: INTERNAL MEDICINE

## 2018-04-17 NOTE — PROGRESS NOTES
[facility] [place]      NAME: Irving Griffin  AGE: 78 y o  SEX: male  : 1950   MRN: [id number]    DATE: 2018  TIME: 2:17 PM    Assessment and Plan   1  Essential hypertension  Adequately controlled  Because the patient's blood pressure was elevated when he came in, I asked him to check this at home on a regular basis  - CBC; Future  - Comprehensive metabolic panel; Future    2  Hyperlipidemia, unspecified hyperlipidemia type    The patient's lab tests lipid profile is not optimal   He intends to resume his usual exercise program in the near future  He hopes to lose weight over the summer  This will be repeated in about 4 months  - Lipid panel; Future    3  Impaired fasting glucose    Stable on diet   - HEMOGLOBIN A1C W/ EAG ESTIMATION; Future    4  Benign prostatic hyperplasia with urinary retention    Symptoms are well controlled on tamsulosin  Return to office in:   Six months  Chief Complaint     Interval follow-up  History of Present Illness       The patient returned to the office for re-evaluation of hypertension, hyperlipidemia, impaired fasting glucose, and BPH  Since his last visit he has been feeling fairly well  He is now working part-time which is much less stressful  He is having no chest pain, shortness of breath, palpitations, or dizziness  He has not been exercising much  He has gained some weight  He says that now that spring is here he is going to start to ride his bike again  The following portions of the patient's history were reviewed and updated as appropriate: allergies, current medications, past family history, past medical history, past social history, past surgical history and problem list     Review of Systems   Review of Systems   Constitutional: Negative  HENT: Negative for congestion, ear pain, postnasal drip, rhinorrhea, sore throat and trouble swallowing      Eyes: Negative for pain, discharge, redness and visual disturbance  Respiratory: Negative for cough, shortness of breath and wheezing  Cardiovascular: Negative  Gastrointestinal: Negative  Endocrine: Negative  Genitourinary: Negative for difficulty urinating, dysuria, frequency, hematuria and urgency  Musculoskeletal: Negative for arthralgias, gait problem, joint swelling and myalgias  Skin: Negative for rash  Neurological: Negative for dizziness, speech difficulty, weakness, light-headedness, numbness and headaches  Hematological: Negative  Psychiatric/Behavioral: Negative for confusion, decreased concentration, dysphoric mood and sleep disturbance  The patient is not nervous/anxious  Active Problem List     Patient Active Problem List   Diagnosis    Primary osteoarthritis of right knee    Benign prostatic hyperplasia    Glaucoma    Hypertension    Hyperlipidemia    Impaired fasting glucose    Nocturia       Objective   BP (!) 174/86 (BP Location: Left arm, Patient Position: Sitting, Cuff Size: Standard)   Pulse 68   Temp (!) 97 °F (36 1 °C) (Tympanic)   Resp 16   Ht 5' 9" (1 753 m)   Wt 112 kg (248 lb)   BMI 36 62 kg/m²     Physical Exam   Constitutional: He is oriented to person, place, and time  He appears well-developed and well-nourished  HENT:   Head: Normocephalic and atraumatic  Neck: Neck supple  No JVD present  No tracheal deviation present  No thyromegaly present  Cardiovascular: Normal rate, regular rhythm, normal heart sounds and intact distal pulses  Exam reveals no gallop and no friction rub  No murmur heard  Pulmonary/Chest: Effort normal and breath sounds normal  He has no wheezes  He has no rales  He exhibits no tenderness  Abdominal: Soft  Bowel sounds are normal  He exhibits no distension and no mass  There is no tenderness  There is no rebound  Musculoskeletal: Normal range of motion  He exhibits no edema or tenderness  Lymphadenopathy:     He has no cervical adenopathy     Neurological: He is alert and oriented to person, place, and time  Skin: Skin is warm and dry  Psychiatric: He has a normal mood and affect         Pertinent Laboratory/Diagnostic Studies:  Appointment on 04/13/2018   Component Date Value Ref Range Status    Hemoglobin A1C 04/13/2018 6 3  4 2 - 6 3 % Final    EAG 04/13/2018 134  mg/dl Final    Cholesterol 04/13/2018 186  50 - 200 mg/dL Final    Triglycerides 04/13/2018 460* <=150 mg/dL Final    HDL, Direct 04/13/2018 27* 40 - 60 mg/dL Final    LDL Calculated 04/13/2018   0 - 100 mg/dL Final    Non-HDL-Chol (CHOL-HDL) 04/13/2018 159  mg/dl Final           Current Medications     Current Outpatient Prescriptions:     amLODIPine (NORVASC) 5 mg tablet, Take 5 mg by mouth daily, Disp: , Rfl:     hydrochlorothiazide (HYDRODIURIL) 25 mg tablet, Take 25 mg by mouth daily, Disp: , Rfl:     latanoprost (XALATAN) 0 005 % ophthalmic solution, Administer 1 drop into the left eye daily at bedtime, Disp: , Rfl:     losartan (COZAAR) 100 MG tablet, Take 100 mg by mouth daily, Disp: , Rfl:     metoprolol succinate (TOPROL-XL) 50 mg 24 hr tablet, Take 50 mg by mouth daily, Disp: , Rfl:     Multiple Vitamin (MULTI-VITAMIN DAILY PO), Take 1 tablet by mouth daily, Disp: , Rfl:     spironolactone (ALDACTONE) 25 mg tablet, Take 25 mg by mouth daily, Disp: , Rfl:     tamsulosin (FLOMAX) 0 4 mg, TAKE ONE CAPSULE BY MOUTH AT BEDTIME, Disp: 90 capsule, Rfl: 0       Lali Vergara MD

## 2018-05-11 PROCEDURE — 88305 TISSUE EXAM BY PATHOLOGIST: CPT | Performed by: PATHOLOGY

## 2018-05-14 ENCOUNTER — LAB REQUISITION (OUTPATIENT)
Dept: LAB | Facility: HOSPITAL | Age: 68
End: 2018-05-14
Payer: COMMERCIAL

## 2018-05-14 DIAGNOSIS — D48.5 NEOPLASM OF UNCERTAIN BEHAVIOR OF SKIN: ICD-10-CM

## 2018-06-01 DIAGNOSIS — I10 HYPERTENSION, UNSPECIFIED TYPE: Primary | ICD-10-CM

## 2018-06-01 RX ORDER — SPIRONOLACTONE 25 MG/1
25 TABLET ORAL DAILY
Qty: 90 TABLET | Refills: 1 | Status: SHIPPED | OUTPATIENT
Start: 2018-06-01 | End: 2018-12-05 | Stop reason: SDUPTHER

## 2018-07-05 DIAGNOSIS — N40.1 BENIGN PROSTATIC HYPERPLASIA WITH LOWER URINARY TRACT SYMPTOMS, SYMPTOM DETAILS UNSPECIFIED: ICD-10-CM

## 2018-07-05 RX ORDER — TAMSULOSIN HYDROCHLORIDE 0.4 MG/1
0.4 CAPSULE ORAL
Qty: 90 CAPSULE | Refills: 1 | Status: SHIPPED | OUTPATIENT
Start: 2018-07-05 | End: 2018-12-31 | Stop reason: SDUPTHER

## 2018-07-26 DIAGNOSIS — I10 ESSENTIAL HYPERTENSION: Primary | ICD-10-CM

## 2018-07-27 RX ORDER — LOSARTAN POTASSIUM 100 MG/1
100 TABLET ORAL DAILY
Qty: 90 TABLET | Refills: 1 | Status: SHIPPED | OUTPATIENT
Start: 2018-07-27 | End: 2019-01-17 | Stop reason: SDUPTHER

## 2018-07-27 RX ORDER — AMLODIPINE BESYLATE 5 MG/1
5 TABLET ORAL DAILY
Qty: 90 TABLET | Refills: 1 | Status: SHIPPED | OUTPATIENT
Start: 2018-07-27 | End: 2019-02-04 | Stop reason: SDUPTHER

## 2018-08-21 DIAGNOSIS — I10 ESSENTIAL HYPERTENSION: Primary | ICD-10-CM

## 2018-08-22 ENCOUNTER — APPOINTMENT (OUTPATIENT)
Dept: LAB | Facility: MEDICAL CENTER | Age: 68
End: 2018-08-22

## 2018-08-22 ENCOUNTER — TRANSCRIBE ORDERS (OUTPATIENT)
Dept: ADMINISTRATIVE | Facility: HOSPITAL | Age: 68
End: 2018-08-22

## 2018-08-22 DIAGNOSIS — Z00.8 ENCOUNTER FOR OTHER GENERAL EXAMINATION: Primary | ICD-10-CM

## 2018-08-22 DIAGNOSIS — Z00.8 ENCOUNTER FOR OTHER GENERAL EXAMINATION: ICD-10-CM

## 2018-08-22 LAB
CHOLEST SERPL-MCNC: 172 MG/DL (ref 50–200)
EST. AVERAGE GLUCOSE BLD GHB EST-MCNC: 143 MG/DL
HBA1C MFR BLD: 6.6 % (ref 4.2–6.3)
HDLC SERPL-MCNC: 28 MG/DL (ref 40–60)
NONHDLC SERPL-MCNC: 144 MG/DL
TRIGL SERPL-MCNC: 411 MG/DL

## 2018-08-22 PROCEDURE — 36415 COLL VENOUS BLD VENIPUNCTURE: CPT

## 2018-08-22 PROCEDURE — 80061 LIPID PANEL: CPT

## 2018-08-22 PROCEDURE — 83036 HEMOGLOBIN GLYCOSYLATED A1C: CPT

## 2018-08-22 RX ORDER — HYDROCHLOROTHIAZIDE 25 MG/1
25 TABLET ORAL DAILY
Qty: 90 TABLET | Refills: 1 | Status: SHIPPED | OUTPATIENT
Start: 2018-08-22 | End: 2019-02-26 | Stop reason: SDUPTHER

## 2018-11-20 ENCOUNTER — OFFICE VISIT (OUTPATIENT)
Dept: INTERNAL MEDICINE CLINIC | Facility: CLINIC | Age: 68
End: 2018-11-20
Payer: COMMERCIAL

## 2018-11-20 VITALS
BODY MASS INDEX: 37.03 KG/M2 | HEART RATE: 72 BPM | RESPIRATION RATE: 15 BRPM | WEIGHT: 250 LBS | TEMPERATURE: 96.5 F | DIASTOLIC BLOOD PRESSURE: 84 MMHG | SYSTOLIC BLOOD PRESSURE: 154 MMHG | HEIGHT: 69 IN

## 2018-11-20 DIAGNOSIS — N40.1 BENIGN PROSTATIC HYPERPLASIA WITH URINARY RETENTION: ICD-10-CM

## 2018-11-20 DIAGNOSIS — R33.8 BENIGN PROSTATIC HYPERPLASIA WITH URINARY RETENTION: ICD-10-CM

## 2018-11-20 DIAGNOSIS — R21 RASH: ICD-10-CM

## 2018-11-20 DIAGNOSIS — R73.01 IMPAIRED FASTING GLUCOSE: ICD-10-CM

## 2018-11-20 DIAGNOSIS — Z11.59 NEED FOR HEPATITIS C SCREENING TEST: ICD-10-CM

## 2018-11-20 DIAGNOSIS — E78.5 HYPERLIPIDEMIA, UNSPECIFIED HYPERLIPIDEMIA TYPE: ICD-10-CM

## 2018-11-20 DIAGNOSIS — Z23 NEED FOR PNEUMOCOCCAL VACCINATION: ICD-10-CM

## 2018-11-20 DIAGNOSIS — I10 ESSENTIAL HYPERTENSION: Primary | ICD-10-CM

## 2018-11-20 PROCEDURE — 3008F BODY MASS INDEX DOCD: CPT | Performed by: INTERNAL MEDICINE

## 2018-11-20 PROCEDURE — 99214 OFFICE O/P EST MOD 30 MIN: CPT | Performed by: INTERNAL MEDICINE

## 2018-11-20 PROCEDURE — 1160F RVW MEDS BY RX/DR IN RCRD: CPT

## 2018-11-20 PROCEDURE — 1160F RVW MEDS BY RX/DR IN RCRD: CPT | Performed by: INTERNAL MEDICINE

## 2018-11-20 PROCEDURE — 90471 IMMUNIZATION ADMIN: CPT

## 2018-11-20 PROCEDURE — 1036F TOBACCO NON-USER: CPT | Performed by: INTERNAL MEDICINE

## 2018-11-20 PROCEDURE — 90670 PCV13 VACCINE IM: CPT

## 2018-11-20 RX ORDER — MOMETASONE FUROATE 1 MG/G
CREAM TOPICAL DAILY
Qty: 45 G | Refills: 0 | Status: SHIPPED | OUTPATIENT
Start: 2018-11-20 | End: 2019-09-05 | Stop reason: ALTCHOICE

## 2018-11-21 NOTE — PROGRESS NOTES
[facility] [place]      NAME: Lucina Griffin  AGE: 80 y o  SEX: male  : 1950   MRN: [id number]    DATE: 2018  TIME: 4:20 PM    Assessment and Plan   1  Essential hypertension  Adequately controlled  - CBC  - Comprehensive metabolic panel    2  Hyperlipidemia, unspecified hyperlipidemia type  Currently under dietary therapy  Repeat lipid profile is due  - Lipid panel    3  Impaired fasting glucose  On diet  Check hemoglobin A1c   - Hemoglobin A1C    4  Benign prostatic hyperplasia  Urology evaluation has been requested  The patient remains on tamsulosin  - Ambulatory referral to Urology; Future  - PSA, Total Screen; Future    5  Rash  - mometasone (ELOCON) 0 1 % cream; Apply topically daily  Dispense: 45 g; Refill: 0    6  Need for hepatitis C screening test  - Hepatitis C Qualitative by PCR    7  Need for pneumococcal vaccination  - PNEUMOCOCCAL CONJUGATE VACCINE 13-VALENT GREATER THAN 6 MONTHS    The patient is doing generally well  His blood pressure is well controlled  His lipids and glucose were good when last checked  He recently joined a gym  He he will be trying to lose weight  He was very successful with this in the past   I asked him to return for follow-up in 6 months  Return to office in:   6 months    Chief Complaint     Chief Complaint   Patient presents with    Follow-up     6 months, rash of legs, declines the flu vaccine       History of Present Illness     The patient returned to the office for re-evaluation of hypertension, hyper lipidemia, impaired fasting glucose, and BPH  Since his last visit he has been feeling reasonably well  He has had no chest pain, shortness of breath, palpitations, or dizziness  He does have nocturia approximately every 2 hours  When he started on tamsulosin this improved temporarily but has again worsened  He has had no hematuria or dysuria  He has had no issues with his medications      The following portions of the patient's history were reviewed and updated as appropriate: allergies, current medications, past family history, past medical history, past social history, past surgical history and problem list     Review of Systems   Review of Systems   Constitutional: Negative  HENT: Negative for congestion, ear pain, postnasal drip, rhinorrhea, sore throat and trouble swallowing  Eyes: Negative for pain, discharge, redness and visual disturbance  Respiratory: Negative for cough, shortness of breath and wheezing  Cardiovascular: Negative  Gastrointestinal: Negative  Endocrine: Negative  Genitourinary: Positive for frequency  Negative for difficulty urinating, dysuria, hematuria and urgency  Musculoskeletal: Negative for arthralgias, gait problem, joint swelling and myalgias  Skin: Negative for rash  Neurological: Negative for dizziness, speech difficulty, weakness, light-headedness, numbness and headaches  Hematological: Negative  Psychiatric/Behavioral: Negative for confusion, decreased concentration, dysphoric mood and sleep disturbance  The patient is not nervous/anxious  Active Problem List     Patient Active Problem List   Diagnosis    Primary osteoarthritis of right knee    Benign prostatic hyperplasia    Glaucoma    Hypertension    Hyperlipidemia    Impaired fasting glucose    Nocturia    Rash       Objective   /84 (BP Location: Left arm, Patient Position: Sitting, Cuff Size: Standard)   Pulse 72   Temp (!) 96 5 °F (35 8 °C) (Tympanic)   Resp 15   Ht 5' 9" (1 753 m)   Wt 113 kg (250 lb)   BMI 36 92 kg/m²     Physical Exam   Constitutional: He is oriented to person, place, and time  He appears well-developed and well-nourished  HENT:   Head: Normocephalic and atraumatic  Neck: Neck supple  No JVD present  No tracheal deviation present  No thyromegaly present  Cardiovascular: Normal rate, regular rhythm, normal heart sounds and intact distal pulses    Exam reveals no gallop and no friction rub  No murmur heard  Pulmonary/Chest: Effort normal and breath sounds normal  He has no wheezes  He has no rales  He exhibits no tenderness  Abdominal: Soft  Bowel sounds are normal  He exhibits no distension and no mass  There is no tenderness  There is no rebound  Musculoskeletal: Normal range of motion  He exhibits no tenderness  1+ edema   Lymphadenopathy:     He has no cervical adenopathy  Neurological: He is alert and oriented to person, place, and time  Skin: Skin is warm and dry  Psychiatric: He has a normal mood and affect         Pertinent Laboratory/Diagnostic Studies:  Appointment on 08/22/2018   Component Date Value Ref Range Status    Hemoglobin A1C 08/22/2018 6 6* 4 2 - 6 3 % Final    EAG 08/22/2018 143  mg/dl Final    Cholesterol 08/22/2018 172  50 - 200 mg/dL Final    Triglycerides 08/22/2018 411* <=150 mg/dL Final    HDL, Direct 08/22/2018 28* 40 - 60 mg/dL Final    LDL Calculated 08/22/2018   0 - 100 mg/dL Final    Non-HDL-Chol (CHOL-HDL) 08/22/2018 144  mg/dl Final           Current Medications     Current Outpatient Prescriptions:     amLODIPine (NORVASC) 5 mg tablet, Take 1 tablet (5 mg total) by mouth daily, Disp: 90 tablet, Rfl: 1    hydrochlorothiazide (HYDRODIURIL) 25 mg tablet, Take 1 tablet (25 mg total) by mouth daily, Disp: 90 tablet, Rfl: 1    latanoprost (XALATAN) 0 005 % ophthalmic solution, Administer 1 drop into the left eye daily at bedtime, Disp: , Rfl:     losartan (COZAAR) 100 MG tablet, Take 1 tablet (100 mg total) by mouth daily, Disp: 90 tablet, Rfl: 1    metoprolol succinate (TOPROL-XL) 50 mg 24 hr tablet, Take 50 mg by mouth daily, Disp: , Rfl:     Multiple Vitamin (MULTI-VITAMIN DAILY PO), Take 1 tablet by mouth daily, Disp: , Rfl:     spironolactone (ALDACTONE) 25 mg tablet, Take 1 tablet (25 mg total) by mouth daily, Disp: 90 tablet, Rfl: 1    tamsulosin (FLOMAX) 0 4 mg, Take 1 capsule (0 4 mg total) by mouth daily at bedtime, Disp: 90 capsule, Rfl: 1    mometasone (ELOCON) 0 1 % cream, Apply topically daily, Disp: 45 g, Rfl: 0      Marrian Cabot, MD

## 2018-12-05 DIAGNOSIS — I10 HYPERTENSION, UNSPECIFIED TYPE: ICD-10-CM

## 2018-12-05 RX ORDER — SPIRONOLACTONE 25 MG/1
25 TABLET ORAL DAILY
Qty: 90 TABLET | Refills: 1 | Status: SHIPPED | OUTPATIENT
Start: 2018-12-05 | End: 2019-06-07 | Stop reason: SDUPTHER

## 2018-12-14 ENCOUNTER — OFFICE VISIT (OUTPATIENT)
Dept: UROLOGY | Facility: MEDICAL CENTER | Age: 68
End: 2018-12-14
Payer: COMMERCIAL

## 2018-12-14 VITALS
WEIGHT: 249 LBS | SYSTOLIC BLOOD PRESSURE: 138 MMHG | DIASTOLIC BLOOD PRESSURE: 70 MMHG | HEART RATE: 69 BPM | BODY MASS INDEX: 36.88 KG/M2 | HEIGHT: 69 IN

## 2018-12-14 DIAGNOSIS — N40.1 BENIGN PROSTATIC HYPERPLASIA WITH NOCTURIA: ICD-10-CM

## 2018-12-14 DIAGNOSIS — R35.1 BENIGN PROSTATIC HYPERPLASIA WITH NOCTURIA: ICD-10-CM

## 2018-12-14 LAB
SL AMB  POCT GLUCOSE, UA: NORMAL
SL AMB LEUKOCYTE ESTERASE,UA: NORMAL
SL AMB POCT BILIRUBIN,UA: NORMAL
SL AMB POCT BLOOD,UA: NORMAL
SL AMB POCT CLARITY,UA: CLEAR
SL AMB POCT COLOR,UA: YELLOW
SL AMB POCT KETONES,UA: NORMAL
SL AMB POCT NITRITE,UA: NORMAL
SL AMB POCT PH,UA: 6.5
SL AMB POCT SPECIFIC GRAVITY,UA: 1.02
SL AMB POCT URINE PROTEIN: NORMAL
SL AMB POCT UROBILINOGEN: 0.2

## 2018-12-14 PROCEDURE — 99244 OFF/OP CNSLTJ NEW/EST MOD 40: CPT | Performed by: UROLOGY

## 2018-12-14 PROCEDURE — 81003 URINALYSIS AUTO W/O SCOPE: CPT | Performed by: UROLOGY

## 2018-12-14 NOTE — PROGRESS NOTES
Assessment/Plan:    Benign prostatic hyperplasia  AUA symptom score is 11 on tamsulosin  Urinalysis is negative  He is satisfied with his voiding pattern  I recommended fluid restriction in the evening to help with the nocturia  We discussed urolift as a minimally invasive form of therapy that might allow him to get off of prostate medication and provide greater symptom relief  Information was given and he will consider this option  If he does not wish to proceed with urolift, he will return in 1 year  He will have his PSA level done in the next few weeks  Diagnoses and all orders for this visit:    Benign prostatic hyperplasia with nocturia  -     Ambulatory referral to Urology  -     POCT urine dip auto non-scope          Subjective:      Patient ID: Ann Marie Phipps is a 80 y o  male  Benign Prostatic Hypertrophy   This is a chronic problem  The current episode started more than 1 year ago  The problem has been gradually improving since onset  Irritative symptoms include nocturia and urgency  Obstructive symptoms include dribbling and a slower stream  Obstructive symptoms do not include incomplete emptying, an intermittent stream, straining or a weak stream  Pertinent negatives include no chills, dysuria, genital pain, hematuria, hesitancy, nausea or vomiting  AUA score is 8-19  Nothing aggravates the symptoms  Past treatments include tamsulosin  The treatment provided moderate relief  He has been using treatment for 6 to 12 months  The following portions of the patient's history were reviewed and updated as appropriate: allergies, current medications, past family history, past medical history, past social history, past surgical history and problem list     Review of Systems   Constitutional: Negative  Negative for chills, diaphoresis, fatigue and fever  HENT: Negative  Eyes: Negative  Respiratory: Negative  Cardiovascular: Positive for leg swelling     Gastrointestinal: Negative  Negative for nausea and vomiting  Endocrine: Negative  Genitourinary: Positive for nocturia and urgency  Negative for dysuria, hematuria, hesitancy and incomplete emptying  See HPI   Musculoskeletal: Positive for back pain  Skin: Negative  Allergic/Immunologic: Negative  Neurological: Negative  Hematological: Negative  Psychiatric/Behavioral: Negative  Objective:      /70 (BP Location: Left arm, Patient Position: Sitting)   Pulse 69   Ht 5' 9" (1 753 m)   Wt 113 kg (249 lb)   BMI 36 77 kg/m²          Physical Exam   Constitutional: He is oriented to person, place, and time  He appears well-developed and well-nourished  HENT:   Head: Normocephalic and atraumatic  Eyes: Conjunctivae are normal    Neck: Neck supple  Cardiovascular: Normal rate  Pulmonary/Chest: Effort normal    Abdominal: Soft  Bowel sounds are normal  He exhibits no distension and no mass  There is no tenderness  There is no rebound, no guarding and no CVA tenderness  Genitourinary: Rectum normal, testes normal and penis normal  Right testis shows no mass  Left testis shows no mass  No phimosis or hypospadias  Genitourinary Comments: Prostate 2+ enlarged and palpably benign  Musculoskeletal: He exhibits no edema  Neurological: He is alert and oriented to person, place, and time  Skin: Skin is warm and dry  Psychiatric: He has a normal mood and affect  His behavior is normal  Judgment and thought content normal    Vitals reviewed

## 2018-12-14 NOTE — PATIENT INSTRUCTIONS
Urolift information was given  Benign Prostatic Hypertrophy   WHAT YOU NEED TO KNOW:   Benign prostatic hypertrophy (BPH) is a condition that causes your prostate gland to grow larger than normal  The prostate gland is the male sex gland that produces a fluid that is part of semen  It is about the size of a walnut and it is located under the bladder  As the prostate grows, it can squeeze the urethra  This can block urine flow and cause urinary problems  DISCHARGE INSTRUCTIONS:   Medicines:   · Alpha blockers: This medicine relaxes the muscles in your prostate and bladder  It may help you urinate more easily  · 5 alpha reductase inhibitors: These medicines block the production of a hormone that causes the prostate to get larger  It may help slow the growth of the prostate or shrink the prostate  · Take your medicine as directed  Contact your healthcare provider if you think your medicine is not helping or if you have side effects  Tell him or her if you are allergic to any medicine  Keep a list of the medicines, vitamins, and herbs you take  Include the amounts, and when and why you take them  Bring the list or the pill bottles to follow-up visits  Carry your medicine list with you in case of an emergency  Follow up with your healthcare provider as directed:  Write down your questions so you remember to ask them during your visits  Manage BPH:   · Do not let your bladder get too full before you empty it  Urinate when you feel the urge  · Limit alcohol  Do not drink large amounts of any liquid at one time  · Decrease the amount of salt you eat  Examples of salty foods are chips, cured meats, and canned soups  Do not use table salt  · Healthcare providers may tell you not to eat spicy foods such as chilli peppers  This may help you find out if spicy food makes your BPH symptoms worse  · You may have sex if you feel well    Contact your healthcare provider if:   · There is a large amount of blood in your urine  · Your signs and symptoms get worse  · You have a fever  · You have questions or concerns about your condition or care  Seek care immediately if:   · You are unable to urinate  · Your bladder feels very full and painful  © 2017 [employer] Information is for End User's use only and may not be sold, redistributed or otherwise used for commercial purposes  All illustrations and images included in CareNotes® are the copyrighted property of [employer] , [employer]  or [employer]  The above information is an  only  It is not intended as medical advice for individual conditions or treatments  Talk to your doctor, nurse or pharmacist before following any medical regimen to see if it is safe and effective for you

## 2018-12-14 NOTE — LETTER
December 14, 2018     Trey Arias MD  [address] [facility]    Patient: Elke Colindres   YOB: 1950   Date of Visit: 12/14/2018       Dear Dr Cruz Lunch: Thank you for referring Andrea Villegas to me for evaluation  Below are my notes for this consultation  If you have questions, please do not hesitate to call me  I look forward to following your patient along with you  Sincerely,        Violeta Lucero MD        CC: No Recipients  Violeta Lucero MD  12/14/2018  1:35 PM  Sign at close encounter  Assessment/Plan:    Benign prostatic hyperplasia  AUA symptom score is 11 on tamsulosin  Urinalysis is negative  He is satisfied with his voiding pattern  I recommended fluid restriction in the evening to help with the nocturia  We discussed urolift as a minimally invasive form of therapy that might allow him to get off of prostate medication and provide greater symptom relief  Information was given and he will consider this option  If he does not wish to proceed with urolift, he will return in 1 year  He will have his PSA level done in the next few weeks  Diagnoses and all orders for this visit:    Benign prostatic hyperplasia with nocturia  -     Ambulatory referral to Urology  -     POCT urine dip auto non-scope          Subjective:      Patient ID: Elke Colindres is a 80 y o  male  Benign Prostatic Hypertrophy   This is a chronic problem  The current episode started more than 1 year ago  The problem has been gradually improving since onset  Irritative symptoms include nocturia and urgency  Obstructive symptoms include dribbling and a slower stream  Obstructive symptoms do not include incomplete emptying, an intermittent stream, straining or a weak stream  Pertinent negatives include no chills, dysuria, genital pain, hematuria, hesitancy, nausea or vomiting  AUA score is 8-19  Nothing aggravates the symptoms   Past treatments include tamsulosin  The treatment provided moderate relief  He has been using treatment for 6 to 12 months  The following portions of the patient's history were reviewed and updated as appropriate: allergies, current medications, past family history, past medical history, past social history, past surgical history and problem list     Review of Systems   Constitutional: Negative  Negative for chills, diaphoresis, fatigue and fever  HENT: Negative  Eyes: Negative  Respiratory: Negative  Cardiovascular: Positive for leg swelling  Gastrointestinal: Negative  Negative for nausea and vomiting  Endocrine: Negative  Genitourinary: Positive for nocturia and urgency  Negative for dysuria, hematuria, hesitancy and incomplete emptying  See HPI   Musculoskeletal: Positive for back pain  Skin: Negative  Allergic/Immunologic: Negative  Neurological: Negative  Hematological: Negative  Psychiatric/Behavioral: Negative  Objective:      /70 (BP Location: Left arm, Patient Position: Sitting)   Pulse 69   Ht 5' 9" (1 753 m)   Wt 113 kg (249 lb)   BMI 36 77 kg/m²           Physical Exam   Constitutional: He is oriented to person, place, and time  He appears well-developed and well-nourished  HENT:   Head: Normocephalic and atraumatic  Eyes: Conjunctivae are normal    Neck: Neck supple  Cardiovascular: Normal rate  Pulmonary/Chest: Effort normal    Abdominal: Soft  Bowel sounds are normal  He exhibits no distension and no mass  There is no tenderness  There is no rebound, no guarding and no CVA tenderness  Genitourinary: Rectum normal, testes normal and penis normal  Right testis shows no mass  Left testis shows no mass  No phimosis or hypospadias  Genitourinary Comments: Prostate 2+ enlarged and palpably benign  Musculoskeletal: He exhibits no edema  Neurological: He is alert and oriented to person, place, and time  Skin: Skin is warm and dry     Psychiatric: He has a normal mood and affect  His behavior is normal  Judgment and thought content normal    Vitals reviewed

## 2018-12-31 DIAGNOSIS — N40.1 BENIGN PROSTATIC HYPERPLASIA WITH LOWER URINARY TRACT SYMPTOMS, SYMPTOM DETAILS UNSPECIFIED: ICD-10-CM

## 2018-12-31 RX ORDER — TAMSULOSIN HYDROCHLORIDE 0.4 MG/1
CAPSULE ORAL
Qty: 90 CAPSULE | Refills: 0 | Status: SHIPPED | OUTPATIENT
Start: 2018-12-31 | End: 2019-04-03 | Stop reason: SDUPTHER

## 2019-01-17 DIAGNOSIS — I10 ESSENTIAL HYPERTENSION: ICD-10-CM

## 2019-01-17 RX ORDER — LOSARTAN POTASSIUM 100 MG/1
100 TABLET ORAL DAILY
Qty: 90 TABLET | Refills: 0 | Status: SHIPPED | OUTPATIENT
Start: 2019-01-17 | End: 2019-05-03 | Stop reason: SDUPTHER

## 2019-01-30 PROCEDURE — 88305 TISSUE EXAM BY PATHOLOGIST: CPT | Performed by: PATHOLOGY

## 2019-02-01 ENCOUNTER — LAB REQUISITION (OUTPATIENT)
Dept: LAB | Facility: HOSPITAL | Age: 69
End: 2019-02-01
Payer: COMMERCIAL

## 2019-02-01 DIAGNOSIS — D48.5 NEOPLASM OF UNCERTAIN BEHAVIOR OF SKIN: ICD-10-CM

## 2019-02-04 DIAGNOSIS — I10 ESSENTIAL HYPERTENSION: ICD-10-CM

## 2019-02-04 RX ORDER — METOPROLOL SUCCINATE 50 MG/1
50 TABLET, EXTENDED RELEASE ORAL DAILY
Qty: 90 TABLET | Refills: 1 | Status: SHIPPED | OUTPATIENT
Start: 2019-02-04 | End: 2019-08-05 | Stop reason: SDUPTHER

## 2019-02-04 RX ORDER — AMLODIPINE BESYLATE 5 MG/1
5 TABLET ORAL DAILY
Qty: 90 TABLET | Refills: 1 | Status: SHIPPED | OUTPATIENT
Start: 2019-02-04 | End: 2019-08-05 | Stop reason: SDUPTHER

## 2019-02-26 DIAGNOSIS — I10 ESSENTIAL HYPERTENSION: ICD-10-CM

## 2019-02-26 RX ORDER — HYDROCHLOROTHIAZIDE 25 MG/1
25 TABLET ORAL DAILY
Qty: 90 TABLET | Refills: 1 | Status: SHIPPED | OUTPATIENT
Start: 2019-02-26 | End: 2019-08-22 | Stop reason: SDUPTHER

## 2019-04-03 DIAGNOSIS — N40.1 BENIGN PROSTATIC HYPERPLASIA WITH LOWER URINARY TRACT SYMPTOMS, SYMPTOM DETAILS UNSPECIFIED: ICD-10-CM

## 2019-04-03 RX ORDER — TAMSULOSIN HYDROCHLORIDE 0.4 MG/1
CAPSULE ORAL
Qty: 90 CAPSULE | Refills: 0 | Status: SHIPPED | OUTPATIENT
Start: 2019-04-03 | End: 2019-04-09 | Stop reason: SDUPTHER

## 2019-04-08 DIAGNOSIS — N40.1 BENIGN PROSTATIC HYPERPLASIA WITH LOWER URINARY TRACT SYMPTOMS, SYMPTOM DETAILS UNSPECIFIED: ICD-10-CM

## 2019-04-08 RX ORDER — TAMSULOSIN HYDROCHLORIDE 0.4 MG/1
CAPSULE ORAL
Qty: 90 CAPSULE | Refills: 0 | OUTPATIENT
Start: 2019-04-08

## 2019-04-09 DIAGNOSIS — N40.1 BENIGN PROSTATIC HYPERPLASIA WITH LOWER URINARY TRACT SYMPTOMS, SYMPTOM DETAILS UNSPECIFIED: ICD-10-CM

## 2019-04-09 RX ORDER — TAMSULOSIN HYDROCHLORIDE 0.4 MG/1
0.4 CAPSULE ORAL
Qty: 90 CAPSULE | Refills: 1 | Status: SHIPPED | OUTPATIENT
Start: 2019-04-09 | End: 2019-07-08 | Stop reason: SDUPTHER

## 2019-05-03 DIAGNOSIS — I10 ESSENTIAL HYPERTENSION: ICD-10-CM

## 2019-05-03 RX ORDER — LOSARTAN POTASSIUM 100 MG/1
TABLET ORAL
Qty: 90 TABLET | Refills: 0 | Status: SHIPPED | OUTPATIENT
Start: 2019-05-03 | End: 2019-08-05 | Stop reason: SDUPTHER

## 2019-06-07 DIAGNOSIS — I10 HYPERTENSION, UNSPECIFIED TYPE: ICD-10-CM

## 2019-06-07 RX ORDER — SPIRONOLACTONE 25 MG/1
25 TABLET ORAL DAILY
Qty: 90 TABLET | Refills: 0 | Status: SHIPPED | OUTPATIENT
Start: 2019-06-07 | End: 2019-09-05 | Stop reason: SDUPTHER

## 2019-07-08 DIAGNOSIS — N40.1 BENIGN PROSTATIC HYPERPLASIA WITH LOWER URINARY TRACT SYMPTOMS, SYMPTOM DETAILS UNSPECIFIED: ICD-10-CM

## 2019-07-09 RX ORDER — TAMSULOSIN HYDROCHLORIDE 0.4 MG/1
0.4 CAPSULE ORAL
Qty: 90 CAPSULE | Refills: 1 | Status: SHIPPED | OUTPATIENT
Start: 2019-07-09 | End: 2020-07-17 | Stop reason: SDUPTHER

## 2019-08-05 DIAGNOSIS — I10 ESSENTIAL HYPERTENSION: ICD-10-CM

## 2019-08-06 RX ORDER — METOPROLOL SUCCINATE 50 MG/1
50 TABLET, EXTENDED RELEASE ORAL DAILY
Qty: 90 TABLET | Refills: 1 | Status: SHIPPED | OUTPATIENT
Start: 2019-08-06 | End: 2019-11-04 | Stop reason: SDUPTHER

## 2019-08-06 RX ORDER — LOSARTAN POTASSIUM 100 MG/1
100 TABLET ORAL DAILY
Qty: 90 TABLET | Refills: 0 | Status: SHIPPED | OUTPATIENT
Start: 2019-08-06 | End: 2019-10-30 | Stop reason: SDUPTHER

## 2019-08-06 RX ORDER — AMLODIPINE BESYLATE 5 MG/1
5 TABLET ORAL DAILY
Qty: 90 TABLET | Refills: 1 | Status: SHIPPED | OUTPATIENT
Start: 2019-08-06 | End: 2020-01-28 | Stop reason: SDUPTHER

## 2019-08-22 DIAGNOSIS — I10 ESSENTIAL HYPERTENSION: ICD-10-CM

## 2019-08-23 RX ORDER — HYDROCHLOROTHIAZIDE 25 MG/1
25 TABLET ORAL DAILY
Qty: 90 TABLET | Refills: 1 | Status: SHIPPED | OUTPATIENT
Start: 2019-08-23 | End: 2020-02-21 | Stop reason: SDUPTHER

## 2019-09-05 ENCOUNTER — OFFICE VISIT (OUTPATIENT)
Dept: INTERNAL MEDICINE CLINIC | Facility: CLINIC | Age: 69
End: 2019-09-05
Payer: COMMERCIAL

## 2019-09-05 VITALS
DIASTOLIC BLOOD PRESSURE: 80 MMHG | WEIGHT: 246.8 LBS | SYSTOLIC BLOOD PRESSURE: 138 MMHG | HEIGHT: 69 IN | HEART RATE: 66 BPM | TEMPERATURE: 97.9 F | OXYGEN SATURATION: 95 % | BODY MASS INDEX: 36.56 KG/M2

## 2019-09-05 DIAGNOSIS — E78.5 HYPERLIPIDEMIA, UNSPECIFIED HYPERLIPIDEMIA TYPE: ICD-10-CM

## 2019-09-05 DIAGNOSIS — R35.1 BENIGN PROSTATIC HYPERPLASIA WITH NOCTURIA: ICD-10-CM

## 2019-09-05 DIAGNOSIS — I10 HYPERTENSION, UNSPECIFIED TYPE: ICD-10-CM

## 2019-09-05 DIAGNOSIS — I10 ESSENTIAL HYPERTENSION: Primary | ICD-10-CM

## 2019-09-05 DIAGNOSIS — N40.1 BENIGN PROSTATIC HYPERPLASIA WITH NOCTURIA: ICD-10-CM

## 2019-09-05 DIAGNOSIS — R73.01 IMPAIRED FASTING GLUCOSE: ICD-10-CM

## 2019-09-05 PROCEDURE — 1101F PT FALLS ASSESS-DOCD LE1/YR: CPT | Performed by: INTERNAL MEDICINE

## 2019-09-05 PROCEDURE — 99214 OFFICE O/P EST MOD 30 MIN: CPT | Performed by: INTERNAL MEDICINE

## 2019-09-05 PROCEDURE — 3075F SYST BP GE 130 - 139MM HG: CPT | Performed by: INTERNAL MEDICINE

## 2019-09-05 PROCEDURE — 3079F DIAST BP 80-89 MM HG: CPT | Performed by: INTERNAL MEDICINE

## 2019-09-05 RX ORDER — SPIRONOLACTONE 25 MG/1
25 TABLET ORAL DAILY
Qty: 90 TABLET | Refills: 3 | Status: SHIPPED | OUTPATIENT
Start: 2019-09-05 | End: 2020-08-31 | Stop reason: SDUPTHER

## 2019-09-06 NOTE — PROGRESS NOTES
[facility] Medicine [place]      NAME: Alicia Griffin  AGE: 80 y o  SEX: male  : 1950   MRN: [id number]    DATE: 2019  TIME: 3:32 PM    Assessment and Plan   1  Essential hypertension  Well controlled  2  Hyperlipidemia, unspecified hyperlipidemia type  Continue with diet and exercise for now  3  Impaired fasting glucose  Continue diet  The patient is actively trying to lose weight  4  Benign prostatic hyperplasia with nocturia  Recently evaluated by Urology  On tamsulosin  The patient is doing generally well  He has recently increased his exercise  He is watching his diet more carefully  He has lost a few lb and is hopeful that he can lose 25 more  He had lab work ordered when he was last here and will be getting this in the near future  He will continue his current medications for now  Return to office in:  3 months    Chief Complaint     Chief Complaint   Patient presents with    Follow-up       History of Present Illness     The patient returned to the office for re-evaluation of hypertension, hyperlipidemia, impaired fasting glucose, BPH, and osteoarthritis  Since his last visit he has been feeling pretty well  He has no chest pain, shortness of breath, palpitations, or dizziness  He does have urinary frequency and some nocturia  He was seen recently by Urology  Various therapeutic options were discussed  The patient recently has increased his exercise program   He is trying to lose weight  The following portions of the patient's history were reviewed and updated as appropriate: allergies, current medications, past family history, past medical history, past social history, past surgical history and problem list     Review of Systems   Review of Systems   Constitutional: Negative  HENT: Negative for congestion, ear pain, postnasal drip, rhinorrhea, sore throat and trouble swallowing  Eyes: Negative for pain, discharge, redness and visual disturbance  Respiratory: Negative for cough, shortness of breath and wheezing  Cardiovascular: Negative  Gastrointestinal: Negative  Endocrine: Negative  Genitourinary: Positive for frequency  Negative for difficulty urinating, dysuria, hematuria and urgency  Musculoskeletal: Negative for arthralgias, gait problem, joint swelling and myalgias  Skin: Negative for rash  Neurological: Negative for dizziness, speech difficulty, weakness, light-headedness, numbness and headaches  Hematological: Negative  Psychiatric/Behavioral: Negative for confusion, decreased concentration, dysphoric mood and sleep disturbance  The patient is not nervous/anxious  Active Problem List     Patient Active Problem List   Diagnosis    Primary osteoarthritis of right knee    Benign prostatic hyperplasia    Glaucoma    Hypertension    Hyperlipidemia    Impaired fasting glucose    Nocturia    Rash       Objective   /80 (BP Location: Right arm, Patient Position: Sitting, Cuff Size: Standard)   Pulse 66   Temp 97 9 °F (36 6 °C) (Tympanic)   Ht 5' 9" (1 753 m)   Wt 112 kg (246 lb 12 8 oz)   SpO2 95%   BMI 36 45 kg/m²     Physical Exam   Constitutional: He is oriented to person, place, and time  He appears well-developed  No distress  Obese   HENT:   Head: Normocephalic and atraumatic  Neck: Neck supple  No JVD present  No tracheal deviation present  No thyromegaly present  Cardiovascular: Normal rate, regular rhythm, normal heart sounds and intact distal pulses  Exam reveals no gallop and no friction rub  No murmur heard  Pulmonary/Chest: Effort normal and breath sounds normal  He has no wheezes  He has no rales  He exhibits no tenderness  Abdominal: Soft  Bowel sounds are normal  He exhibits no distension and no mass  There is no tenderness  There is no rebound  Musculoskeletal: Normal range of motion  He exhibits no edema or tenderness  Lymphadenopathy:     He has no cervical adenopathy  Neurological: He is alert and oriented to person, place, and time  Skin: Skin is warm and dry  Psychiatric: He has a normal mood and affect  His behavior is normal  Judgment and thought content normal        Pertinent Laboratory/Diagnostic Studies:  No visits with results within 3 Month(s) from this visit  Latest known visit with results is:   Lab Requisition on 01/30/2019   Component Date Value Ref Range Status    Case Report 01/30/2019    Final                    Value:Surgical Pathology Report                         Case: [id number]                                   Authorizing Provider:  Xi Strong  Collected:           01/30/2019                   Pathologist:           Antonietta Goldberg, MD                Received:            02/01/2019 1410              Specimen:    Skin, Other, Left neck                                                                     Final Diagnosis 01/30/2019    Final                    Value: This result contains rich text formatting which cannot be displayed here   Additional Information 01/30/2019    Final                    Value: This result contains rich text formatting which cannot be displayed here  Param Fall Description 01/30/2019    Final                    Value: This result contains rich text formatting which cannot be displayed here             Current Medications     Current Outpatient Medications:     amLODIPine (NORVASC) 5 mg tablet, Take 1 tablet (5 mg total) by mouth daily, Disp: 90 tablet, Rfl: 1    hydrochlorothiazide (HYDRODIURIL) 25 mg tablet, Take 1 tablet (25 mg total) by mouth daily, Disp: 90 tablet, Rfl: 1    latanoprost (XALATAN) 0 005 % ophthalmic solution, Administer 1 drop into the left eye daily at bedtime, Disp: , Rfl:     losartan (COZAAR) 100 MG tablet, Take 1 tablet (100 mg total) by mouth daily, Disp: 90 tablet, Rfl: 0    metoprolol succinate (TOPROL-XL) 50 mg 24 hr tablet, Take 1 tablet (50 mg total) by mouth daily, Disp: 90 tablet, Rfl: 1    Multiple Vitamin (MULTI-VITAMIN DAILY PO), Take 1 tablet by mouth daily, Disp: , Rfl:     spironolactone (ALDACTONE) 25 mg tablet, Take 1 tablet (25 mg total) by mouth daily, Disp: 90 tablet, Rfl: 3    tamsulosin (FLOMAX) 0 4 mg, Take 1 capsule (0 4 mg total) by mouth daily at bedtime, Disp: 90 capsule, Rfl: 1      Nadia Dewitt MD

## 2019-10-22 ENCOUNTER — OFFICE VISIT (OUTPATIENT)
Dept: INTERNAL MEDICINE CLINIC | Facility: CLINIC | Age: 69
End: 2019-10-22
Payer: COMMERCIAL

## 2019-10-22 VITALS
HEIGHT: 69 IN | TEMPERATURE: 97.6 F | SYSTOLIC BLOOD PRESSURE: 149 MMHG | HEART RATE: 72 BPM | OXYGEN SATURATION: 98 % | BODY MASS INDEX: 36.94 KG/M2 | DIASTOLIC BLOOD PRESSURE: 70 MMHG | WEIGHT: 249.4 LBS

## 2019-10-22 DIAGNOSIS — J02.9 PHARYNGITIS, UNSPECIFIED ETIOLOGY: Primary | ICD-10-CM

## 2019-10-22 DIAGNOSIS — I10 ESSENTIAL HYPERTENSION: ICD-10-CM

## 2019-10-22 LAB — S PYO AG THROAT QL: NEGATIVE

## 2019-10-22 PROCEDURE — 99212 OFFICE O/P EST SF 10 MIN: CPT | Performed by: INTERNAL MEDICINE

## 2019-10-22 PROCEDURE — 87880 STREP A ASSAY W/OPTIC: CPT | Performed by: INTERNAL MEDICINE

## 2019-10-22 PROCEDURE — 3008F BODY MASS INDEX DOCD: CPT | Performed by: INTERNAL MEDICINE

## 2019-10-23 NOTE — PROGRESS NOTES
[facility] Internal Medicine [place]      NAME: Gretchen Griffin  AGE: 80 y o  SEX: male  : 1950   MRN: [id number]    DATE: 10/23/2019  TIME: 7:41 AM    Assessment and Plan   1  Pharyngitis, unspecified etiology  Rapid strep screen was negative  This is likely viral   The patient is somewhat better today and will continue symptomatic treatment   - POCT rapid strepA    2  Essential hypertension  Generally stable  Return to office in:  As scheduled    Chief Complaint     Chief Complaint   Patient presents with    Sore Throat    Cough       History of Present Illness     The patient came to the office unexpectedly because of sore throat  This is been going on for about 3 days  He denies fever, cough, difficulty swallowing, significant nasal congestion, etc   His wife looked at his throat today and thought that he should be checked for strep  He has been doing reasonably well otherwise since his last visit  The following portions of the patient's history were reviewed and updated as appropriate: allergies, current medications, past family history, past medical history, past social history, past surgical history and problem list     Review of Systems   Review of Systems   Constitutional: Negative  HENT: Positive for sore throat  Negative for congestion, ear pain, postnasal drip, rhinorrhea and trouble swallowing  Eyes: Negative for pain, discharge, redness and visual disturbance  Respiratory: Negative for cough, shortness of breath and wheezing  Cardiovascular: Negative  Gastrointestinal: Negative  Endocrine: Negative  Genitourinary: Negative for difficulty urinating, dysuria, frequency, hematuria and urgency  Musculoskeletal: Negative for arthralgias, gait problem, joint swelling and myalgias  Skin: Negative for rash  Neurological: Negative for dizziness, speech difficulty, weakness, light-headedness, numbness and headaches  Hematological: Negative  Psychiatric/Behavioral: Negative for confusion, decreased concentration, dysphoric mood and sleep disturbance  The patient is not nervous/anxious  Active Problem List     Patient Active Problem List   Diagnosis    Primary osteoarthritis of right knee    Benign prostatic hyperplasia    Glaucoma    Hypertension    Hyperlipidemia    Impaired fasting glucose    Nocturia    Rash       Objective   /70 (BP Location: Left arm, Patient Position: Sitting, Cuff Size: Standard)   Pulse 72   Temp 97 6 °F (36 4 °C) (Tympanic)   Ht 5' 9" (1 753 m)   Wt 113 kg (249 lb 6 4 oz)   SpO2 98%   BMI 36 83 kg/m²     Physical Exam   Constitutional: He is oriented to person, place, and time  He appears well-developed and well-nourished  HENT:   Head: Normocephalic and atraumatic  Right Ear: External ear normal    Left Ear: External ear normal    The pharynx is somewhat erythematous  There is no ulceration or exudate  Neck: Neck supple  No JVD present  No tracheal deviation present  No thyromegaly present  Cardiovascular: Normal rate, regular rhythm, normal heart sounds and intact distal pulses  Exam reveals no gallop and no friction rub  No murmur heard  Pulmonary/Chest: Effort normal and breath sounds normal  He has no wheezes  He has no rales  He exhibits no tenderness  Abdominal: Soft  Bowel sounds are normal  He exhibits no distension and no mass  There is no tenderness  There is no rebound  Musculoskeletal: Normal range of motion  He exhibits no edema or tenderness  Lymphadenopathy:     He has no cervical adenopathy  Neurological: He is alert and oriented to person, place, and time  Skin: Skin is warm and dry  Psychiatric: He has a normal mood and affect         Pertinent Laboratory/Diagnostic Studies:  Office Visit on 10/22/2019   Component Date Value Ref Range Status     RAPID STREP A 10/22/2019 Negative  Negative Final           Current Medications     Current Outpatient Medications:     amLODIPine (NORVASC) 5 mg tablet, Take 1 tablet (5 mg total) by mouth daily, Disp: 90 tablet, Rfl: 1    hydrochlorothiazide (HYDRODIURIL) 25 mg tablet, Take 1 tablet (25 mg total) by mouth daily, Disp: 90 tablet, Rfl: 1    latanoprost (XALATAN) 0 005 % ophthalmic solution, Administer 1 drop into the left eye daily at bedtime, Disp: , Rfl:     losartan (COZAAR) 100 MG tablet, Take 1 tablet (100 mg total) by mouth daily, Disp: 90 tablet, Rfl: 0    metoprolol succinate (TOPROL-XL) 50 mg 24 hr tablet, Take 1 tablet (50 mg total) by mouth daily, Disp: 90 tablet, Rfl: 1    Multiple Vitamin (MULTI-VITAMIN DAILY PO), Take 1 tablet by mouth daily, Disp: , Rfl:     spironolactone (ALDACTONE) 25 mg tablet, Take 1 tablet (25 mg total) by mouth daily, Disp: 90 tablet, Rfl: 3    tamsulosin (FLOMAX) 0 4 mg, Take 1 capsule (0 4 mg total) by mouth daily at bedtime, Disp: 90 capsule, Rfl: 1      Giovanna Urban MD

## 2019-10-30 DIAGNOSIS — I10 ESSENTIAL HYPERTENSION: ICD-10-CM

## 2019-10-31 RX ORDER — LOSARTAN POTASSIUM 100 MG/1
100 TABLET ORAL DAILY
Qty: 90 TABLET | Refills: 0 | Status: SHIPPED | OUTPATIENT
Start: 2019-10-31 | End: 2020-01-28 | Stop reason: SDUPTHER

## 2019-11-04 DIAGNOSIS — I10 ESSENTIAL HYPERTENSION: ICD-10-CM

## 2019-11-05 RX ORDER — METOPROLOL SUCCINATE 50 MG/1
50 TABLET, EXTENDED RELEASE ORAL DAILY
Qty: 90 TABLET | Refills: 1 | Status: SHIPPED | OUTPATIENT
Start: 2019-11-05 | End: 2020-01-28 | Stop reason: SDUPTHER

## 2019-11-27 ENCOUNTER — APPOINTMENT (OUTPATIENT)
Dept: LAB | Facility: MEDICAL CENTER | Age: 69
End: 2019-11-27
Payer: COMMERCIAL

## 2019-11-27 LAB
ALBUMIN SERPL BCP-MCNC: 4.4 G/DL (ref 3.5–5)
ALP SERPL-CCNC: 83 U/L (ref 46–116)
ALT SERPL W P-5'-P-CCNC: 34 U/L (ref 12–78)
ANION GAP SERPL CALCULATED.3IONS-SCNC: 7 MMOL/L (ref 4–13)
AST SERPL W P-5'-P-CCNC: 23 U/L (ref 5–45)
BILIRUB SERPL-MCNC: 0.71 MG/DL (ref 0.2–1)
BUN SERPL-MCNC: 29 MG/DL (ref 5–25)
CALCIUM SERPL-MCNC: 9.2 MG/DL (ref 8.3–10.1)
CHLORIDE SERPL-SCNC: 104 MMOL/L (ref 100–108)
CHOLEST SERPL-MCNC: 183 MG/DL (ref 50–200)
CO2 SERPL-SCNC: 27 MMOL/L (ref 21–32)
CREAT SERPL-MCNC: 1.31 MG/DL (ref 0.6–1.3)
ERYTHROCYTE [DISTWIDTH] IN BLOOD BY AUTOMATED COUNT: 12.6 % (ref 11.6–15.1)
EST. AVERAGE GLUCOSE BLD GHB EST-MCNC: 131 MG/DL
GFR SERPL CREATININE-BSD FRML MDRD: 56 ML/MIN/1.73SQ M
GLUCOSE P FAST SERPL-MCNC: 103 MG/DL (ref 65–99)
HBA1C MFR BLD: 6.2 % (ref 4.2–6.3)
HCT VFR BLD AUTO: 37 % (ref 36.5–49.3)
HDLC SERPL-MCNC: 28 MG/DL
HGB BLD-MCNC: 12.1 G/DL (ref 12–17)
LDLC SERPL CALC-MCNC: 85 MG/DL (ref 0–100)
MCH RBC QN AUTO: 31.7 PG (ref 26.8–34.3)
MCHC RBC AUTO-ENTMCNC: 32.7 G/DL (ref 31.4–37.4)
MCV RBC AUTO: 97 FL (ref 82–98)
NONHDLC SERPL-MCNC: 155 MG/DL
PLATELET # BLD AUTO: 252 THOUSANDS/UL (ref 149–390)
PMV BLD AUTO: 11.4 FL (ref 8.9–12.7)
POTASSIUM SERPL-SCNC: 4.4 MMOL/L (ref 3.5–5.3)
PROT SERPL-MCNC: 7.6 G/DL (ref 6.4–8.2)
RBC # BLD AUTO: 3.82 MILLION/UL (ref 3.88–5.62)
SODIUM SERPL-SCNC: 138 MMOL/L (ref 136–145)
TRIGL SERPL-MCNC: 348 MG/DL
WBC # BLD AUTO: 7.81 THOUSAND/UL (ref 4.31–10.16)

## 2019-11-27 PROCEDURE — 3044F HG A1C LEVEL LT 7.0%: CPT | Performed by: INTERNAL MEDICINE

## 2019-11-27 PROCEDURE — 85027 COMPLETE CBC AUTOMATED: CPT | Performed by: INTERNAL MEDICINE

## 2019-11-27 PROCEDURE — 80061 LIPID PANEL: CPT | Performed by: INTERNAL MEDICINE

## 2019-11-27 PROCEDURE — 87521 HEPATITIS C PROBE&RVRS TRNSC: CPT | Performed by: INTERNAL MEDICINE

## 2019-11-27 PROCEDURE — 83036 HEMOGLOBIN GLYCOSYLATED A1C: CPT | Performed by: INTERNAL MEDICINE

## 2019-11-27 PROCEDURE — 36415 COLL VENOUS BLD VENIPUNCTURE: CPT | Performed by: INTERNAL MEDICINE

## 2019-11-27 PROCEDURE — 80053 COMPREHEN METABOLIC PANEL: CPT | Performed by: INTERNAL MEDICINE

## 2019-12-02 LAB — HCV RNA SERPL QL NAA+PROBE: NEGATIVE

## 2019-12-20 ENCOUNTER — OFFICE VISIT (OUTPATIENT)
Dept: UROLOGY | Facility: MEDICAL CENTER | Age: 69
End: 2019-12-20
Payer: COMMERCIAL

## 2019-12-20 VITALS
WEIGHT: 252 LBS | HEART RATE: 69 BPM | BODY MASS INDEX: 37.33 KG/M2 | DIASTOLIC BLOOD PRESSURE: 74 MMHG | SYSTOLIC BLOOD PRESSURE: 142 MMHG | HEIGHT: 69 IN

## 2019-12-20 DIAGNOSIS — R35.1 BENIGN PROSTATIC HYPERPLASIA WITH NOCTURIA: Primary | ICD-10-CM

## 2019-12-20 DIAGNOSIS — N40.1 BENIGN PROSTATIC HYPERPLASIA WITH NOCTURIA: Primary | ICD-10-CM

## 2019-12-20 LAB
SL AMB  POCT GLUCOSE, UA: NORMAL
SL AMB LEUKOCYTE ESTERASE,UA: NORMAL
SL AMB POCT BILIRUBIN,UA: NORMAL
SL AMB POCT BLOOD,UA: NORMAL
SL AMB POCT CLARITY,UA: CLEAR
SL AMB POCT COLOR,UA: YELLOW
SL AMB POCT KETONES,UA: NORMAL
SL AMB POCT NITRITE,UA: NORMAL
SL AMB POCT PH,UA: 7
SL AMB POCT SPECIFIC GRAVITY,UA: 1.02
SL AMB POCT URINE PROTEIN: NORMAL
SL AMB POCT UROBILINOGEN: 0.2

## 2019-12-20 PROCEDURE — 99214 OFFICE O/P EST MOD 30 MIN: CPT | Performed by: UROLOGY

## 2019-12-20 PROCEDURE — 81003 URINALYSIS AUTO W/O SCOPE: CPT | Performed by: UROLOGY

## 2019-12-20 RX ORDER — NETARSUDIL AND LATANOPROST OPHTHALMIC SOLUTION, 0.02%/0.005% .2; .05 MG/ML; MG/ML
SOLUTION/ DROPS OPHTHALMIC; TOPICAL
Refills: 4 | Status: ON HOLD | COMMUNITY
Start: 2019-12-11

## 2019-12-20 NOTE — LETTER
December 20, 2019     Rafaela Del Valle MD  [address] [facility]    Patient: Xi Adamson   YOB: 1950   Date of Visit: 12/20/2019       Dear Dr Lolis Antoine: Thank you for referring Xi Adamson to me for evaluation  Below are my notes for this consultation  If you have questions, please do not hesitate to call me  I look forward to following your patient along with you  Sincerely,        Adriana Irizarry MD        CC: No Recipients  Adriana Irizarry MD  12/20/2019  3:00 PM  Sign at close encounter  Assessment/Plan:    Benign prostatic hyperplasia  AUA symptom score is 12  Urinalysis is negative  At this point he remains satisfied with his voiding pattern on tamsulosin  We did discuss other options including combined medical therapy, TURP, GreenLight laser and urolift  The patient does note that he snores and I did recommend that he discuss a sleep apnea evaluation with Dr Lolis Antoine  Sleep apnea is a common cause of nocturia  The patient will obtain a PSA level and will return in 1 year for follow-up  He will call should he wish to proceed with further evaluation for urolift or more invasive treatment of his enlarged prostate  Diagnoses and all orders for this visit:    Benign prostatic hyperplasia with nocturia  -     POCT urine dip auto non-scope  -     PSA Total, Diagnostic; Future    Other orders  -     ROCKLATAN 0 02-0 005 % SOLN          Subjective:      Patient ID: Xi Adamson is a 77 y o  male  Benign Prostatic Hypertrophy   This is a chronic problem  The current episode started more than 1 year ago  The problem is unchanged  Irritative symptoms include nocturia (Nocturia x 3)  Irritative symptoms do not include frequency or urgency  Obstructive symptoms include dribbling and a weak stream  Obstructive symptoms do not include incomplete emptying, an intermittent stream, a slower stream or straining   Pertinent negatives include no chills, dysuria, genital pain, hematuria, hesitancy, nausea or vomiting  AUA score is 8-19  His sexual activity is non-contributory to the current illness  The symptoms are aggravated by caffeine  Past treatments include tamsulosin  The treatment provided moderate relief  He has been using treatment for 2 or more years  The following portions of the patient's history were reviewed and updated as appropriate: allergies, current medications, past family history, past medical history, past social history, past surgical history and problem list     Review of Systems   Constitutional: Negative for chills, diaphoresis, fatigue and fever  HENT: Negative  Eyes: Negative  Respiratory: Negative  He snores   Cardiovascular: Negative  Gastrointestinal: Negative  Negative for nausea and vomiting  Endocrine: Negative  Genitourinary: Positive for nocturia (Nocturia x 3)  Negative for dysuria, frequency, hematuria, hesitancy, incomplete emptying and urgency  See HPI   Musculoskeletal: Positive for myalgias  Skin: Negative  Allergic/Immunologic: Negative  Neurological: Negative  Hematological: Negative  Psychiatric/Behavioral: Negative  AUA SYMPTOM SCORE      Most Recent Value   AUA SYMPTOM SCORE   How often have you had a sensation of not emptying your bladder completely after you finished urinating? 1   How often have you had to urinate again less than two hours after you finished urinating? 2   How often have you found you stopped and started again several times when you urinate?  0   How often have you found it difficult to postpone urination? 1   How often have you had a weak urinary stream?  5   How often have you had to push or strain to begin urination? 0   How many times did you most typically get up to urinate from the time you went to bed at night until the time you got up in the morning?   3   Quality of Life: If you were to spend the rest of your life with your urinary condition just the way it is now, how would you feel about that?  2   AUA SYMPTOM SCORE  12        Objective:      /74   Pulse 69   Ht 5' 9" (1 753 m)   Wt 114 kg (252 lb)   BMI 37 21 kg/m²           Physical Exam   Constitutional: He is oriented to person, place, and time  He appears well-developed and well-nourished  HENT:   Head: Normocephalic and atraumatic  Eyes: Conjunctivae are normal    Neck: Neck supple  Cardiovascular: Normal rate  Pulmonary/Chest: Effort normal    Abdominal: Soft  Bowel sounds are normal  He exhibits no distension and no mass  There is no tenderness  There is no rebound, no guarding and no CVA tenderness  No hernia  Genitourinary: Rectum normal, testes normal and penis normal  Right testis shows no mass  Left testis shows no mass  No phimosis or hypospadias  Genitourinary Comments: Prostate 1 5 X to 2 X  enlarged and palpably benign   Musculoskeletal: He exhibits no edema  Neurological: He is alert and oriented to person, place, and time  Skin: Skin is warm and dry  Psychiatric: He has a normal mood and affect  His behavior is normal  Judgment and thought content normal    Vitals reviewed

## 2019-12-20 NOTE — ASSESSMENT & PLAN NOTE
AUA symptom score is 12  Urinalysis is negative  At this point he remains satisfied with his voiding pattern on tamsulosin  We did discuss other options including combined medical therapy, TURP, GreenLight laser and urolift  The patient does note that he snores and I did recommend that he discuss a sleep apnea evaluation with Dr Vinita Estrada  Sleep apnea is a common cause of nocturia  The patient will obtain a PSA level and will return in 1 year for follow-up  He will call should he wish to proceed with further evaluation for urolift or more invasive treatment of his enlarged prostate

## 2019-12-20 NOTE — PROGRESS NOTES
Assessment/Plan:    Benign prostatic hyperplasia  AUA symptom score is 12  Urinalysis is negative  At this point he remains satisfied with his voiding pattern on tamsulosin  We did discuss other options including combined medical therapy, TURP, GreenLight laser and urolift  The patient does note that he snores and I did recommend that he discuss a sleep apnea evaluation with Dr Alton Grossman  Sleep apnea is a common cause of nocturia  The patient will obtain a PSA level and will return in 1 year for follow-up  He will call should he wish to proceed with further evaluation for urolift or more invasive treatment of his enlarged prostate  Diagnoses and all orders for this visit:    Benign prostatic hyperplasia with nocturia  -     POCT urine dip auto non-scope  -     PSA Total, Diagnostic; Future    Other orders  -     ROCKLATAN 0 02-0 005 % SOLN          Subjective:      Patient ID: Galina King is a 77 y o  male  Benign Prostatic Hypertrophy   This is a chronic problem  The current episode started more than 1 year ago  The problem is unchanged  Irritative symptoms include nocturia (Nocturia x 3)  Irritative symptoms do not include frequency or urgency  Obstructive symptoms include dribbling and a weak stream  Obstructive symptoms do not include incomplete emptying, an intermittent stream, a slower stream or straining  Pertinent negatives include no chills, dysuria, genital pain, hematuria, hesitancy, nausea or vomiting  AUA score is 8-19  His sexual activity is non-contributory to the current illness  The symptoms are aggravated by caffeine  Past treatments include tamsulosin  The treatment provided moderate relief  He has been using treatment for 2 or more years         The following portions of the patient's history were reviewed and updated as appropriate: allergies, current medications, past family history, past medical history, past social history, past surgical history and problem list     Review of Systems   Constitutional: Negative for chills, diaphoresis, fatigue and fever  HENT: Negative  Eyes: Negative  Respiratory: Negative  He snores   Cardiovascular: Negative  Gastrointestinal: Negative  Negative for nausea and vomiting  Endocrine: Negative  Genitourinary: Positive for nocturia (Nocturia x 3)  Negative for dysuria, frequency, hematuria, hesitancy, incomplete emptying and urgency  See HPI   Musculoskeletal: Positive for myalgias  Skin: Negative  Allergic/Immunologic: Negative  Neurological: Negative  Hematological: Negative  Psychiatric/Behavioral: Negative  AUA SYMPTOM SCORE      Most Recent Value   AUA SYMPTOM SCORE   How often have you had a sensation of not emptying your bladder completely after you finished urinating? 1   How often have you had to urinate again less than two hours after you finished urinating? 2   How often have you found you stopped and started again several times when you urinate?  0   How often have you found it difficult to postpone urination? 1   How often have you had a weak urinary stream?  5   How often have you had to push or strain to begin urination? 0   How many times did you most typically get up to urinate from the time you went to bed at night until the time you got up in the morning? 3   Quality of Life: If you were to spend the rest of your life with your urinary condition just the way it is now, how would you feel about that?  2   AUA SYMPTOM SCORE  12        Objective:      /74   Pulse 69   Ht 5' 9" (1 753 m)   Wt 114 kg (252 lb)   BMI 37 21 kg/m²          Physical Exam   Constitutional: He is oriented to person, place, and time  He appears well-developed and well-nourished  HENT:   Head: Normocephalic and atraumatic  Eyes: Conjunctivae are normal    Neck: Neck supple  Cardiovascular: Normal rate  Pulmonary/Chest: Effort normal    Abdominal: Soft   Bowel sounds are normal  He exhibits no distension and no mass  There is no tenderness  There is no rebound, no guarding and no CVA tenderness  No hernia  Genitourinary: Rectum normal, testes normal and penis normal  Right testis shows no mass  Left testis shows no mass  No phimosis or hypospadias  Genitourinary Comments: Prostate 1 5 X to 2 X  enlarged and palpably benign   Musculoskeletal: He exhibits no edema  Neurological: He is alert and oriented to person, place, and time  Skin: Skin is warm and dry  Psychiatric: He has a normal mood and affect  His behavior is normal  Judgment and thought content normal    Vitals reviewed

## 2019-12-20 NOTE — PATIENT INSTRUCTIONS
Benign Prostatic Hypertrophy   WHAT YOU NEED TO KNOW:   Benign prostatic hypertrophy (BPH) is a condition that causes your prostate gland to grow larger than normal  The prostate gland is the male sex gland that produces a fluid that is part of semen  It is about the size of a walnut and it is located under the bladder  As the prostate grows, it can squeeze the urethra  This can block urine flow and cause urinary problems  DISCHARGE INSTRUCTIONS:   Medicines:   · Alpha blockers: This medicine relaxes the muscles in your prostate and bladder  It may help you urinate more easily  · 5 alpha reductase inhibitors: These medicines block the production of a hormone that causes the prostate to get larger  It may help slow the growth of the prostate or shrink the prostate  · Take your medicine as directed  Contact your healthcare provider if you think your medicine is not helping or if you have side effects  Tell him or her if you are allergic to any medicine  Keep a list of the medicines, vitamins, and herbs you take  Include the amounts, and when and why you take them  Bring the list or the pill bottles to follow-up visits  Carry your medicine list with you in case of an emergency  Follow up with your healthcare provider as directed:  Write down your questions so you remember to ask them during your visits  Manage BPH:   · Do not let your bladder get too full before you empty it  Urinate when you feel the urge  · Limit alcohol  Do not drink large amounts of any liquid at one time  · Decrease the amount of salt you eat  Examples of salty foods are chips, cured meats, and canned soups  Do not use table salt  · Healthcare providers may tell you not to eat spicy foods such as chilli peppers  This may help you find out if spicy food makes your BPH symptoms worse  · You may have sex if you feel well  Contact your healthcare provider if:   · There is a large amount of blood in your urine  · Your signs and symptoms get worse  · You have a fever  · You have questions or concerns about your condition or care  Seek care immediately if:   · You are unable to urinate  · Your bladder feels very full and painful  © 2017 [employer] Information is for End User's use only and may not be sold, redistributed or otherwise used for commercial purposes  All illustrations and images included in CareNotes® are the copyrighted property of [employer]  or [employer]  The above information is an  only  It is not intended as medical advice for individual conditions or treatments  Talk to your doctor, nurse or pharmacist before following any medical regimen to see if it is safe and effective for you

## 2020-01-14 ENCOUNTER — APPOINTMENT (OUTPATIENT)
Dept: LAB | Facility: MEDICAL CENTER | Age: 70
End: 2020-01-14
Attending: UROLOGY
Payer: COMMERCIAL

## 2020-01-14 DIAGNOSIS — N40.1 BENIGN PROSTATIC HYPERPLASIA WITH NOCTURIA: ICD-10-CM

## 2020-01-14 DIAGNOSIS — R35.1 BENIGN PROSTATIC HYPERPLASIA WITH NOCTURIA: ICD-10-CM

## 2020-01-14 LAB — PSA SERPL-MCNC: 1 NG/ML (ref 0–4)

## 2020-01-14 PROCEDURE — 36415 COLL VENOUS BLD VENIPUNCTURE: CPT

## 2020-01-14 PROCEDURE — 84153 ASSAY OF PSA TOTAL: CPT

## 2020-01-16 ENCOUNTER — OFFICE VISIT (OUTPATIENT)
Dept: INTERNAL MEDICINE CLINIC | Facility: CLINIC | Age: 70
End: 2020-01-16
Payer: COMMERCIAL

## 2020-01-16 VITALS
BODY MASS INDEX: 37.95 KG/M2 | TEMPERATURE: 96.8 F | HEIGHT: 69 IN | HEART RATE: 64 BPM | OXYGEN SATURATION: 97 % | SYSTOLIC BLOOD PRESSURE: 146 MMHG | DIASTOLIC BLOOD PRESSURE: 74 MMHG | WEIGHT: 256.2 LBS

## 2020-01-16 DIAGNOSIS — H40.9 GLAUCOMA, UNSPECIFIED GLAUCOMA TYPE, UNSPECIFIED LATERALITY: ICD-10-CM

## 2020-01-16 DIAGNOSIS — I10 ESSENTIAL HYPERTENSION: Primary | ICD-10-CM

## 2020-01-16 DIAGNOSIS — N40.1 BENIGN PROSTATIC HYPERPLASIA WITH NOCTURIA: ICD-10-CM

## 2020-01-16 DIAGNOSIS — R05.9 COUGH: ICD-10-CM

## 2020-01-16 DIAGNOSIS — E78.5 HYPERLIPIDEMIA, UNSPECIFIED HYPERLIPIDEMIA TYPE: ICD-10-CM

## 2020-01-16 DIAGNOSIS — R73.01 IMPAIRED FASTING GLUCOSE: ICD-10-CM

## 2020-01-16 DIAGNOSIS — R35.1 BENIGN PROSTATIC HYPERPLASIA WITH NOCTURIA: ICD-10-CM

## 2020-01-16 PROCEDURE — 99214 OFFICE O/P EST MOD 30 MIN: CPT | Performed by: INTERNAL MEDICINE

## 2020-01-16 NOTE — PROGRESS NOTES
[facility] [place]      NAME: Jeff Merritt  AGE: 77 y o  SEX: male  : 1950   MRN: [id number]    DATE: 2020  TIME: 2:10 PM    Assessment and Plan   1  Essential hypertension  Well controlled  2  Hyperlipidemia, unspecified hyperlipidemia type  The patient's triglycerides are elevated  He hopes to address this with weight loss  3  Impaired fasting glucose  Stable  4  Glaucoma, unspecified glaucoma type, unspecified laterality  Following with Ophthalmology  5  Benign prostatic hyperplasia with nocturia  Symptom control is satisfactory on tamsulosin  6  Cough  This is likely related to allergy  The patient will have a chest x-ray to exclude other possibilities  - XR chest pa & lateral; Future          Return to office in:  4 months    Chief Complaint     Chief Complaint   Patient presents with    Follow-up     4 month follow up        History of Present Illness     The patient returned to the office for re-evaluation of hypertension, hyperlipidemia, impaired fasting glucose, and BPH  Since he was last here, he has been doing generally well  However, over the past several weeks he has had cough  He has a small amount of discolored sputum in the morning  The rest of the day, he does have some coughing but no real sputum production  Occasionally, when this initially started, his wife would hear wheezing  No he has no shortness of breath or fever  He thinks that over the past week things have improved significantly  He has otherwise been well  He is tolerating his medications  The following portions of the patient's history were reviewed and updated as appropriate: allergies, current medications, past family history, past medical history, past social history, past surgical history and problem list     Review of Systems   Review of Systems   Constitutional: Negative      HENT: Negative for congestion, ear pain, postnasal drip, rhinorrhea, sore throat and trouble swallowing  Eyes: Negative for pain, discharge, redness and visual disturbance  Respiratory: Positive for cough  Negative for shortness of breath and wheezing  Cardiovascular: Negative  Gastrointestinal: Negative  Endocrine: Negative  Genitourinary: Negative for difficulty urinating, dysuria, frequency, hematuria and urgency  Musculoskeletal: Negative for arthralgias, gait problem, joint swelling and myalgias  Skin: Negative for rash  Neurological: Negative for dizziness, speech difficulty, weakness, light-headedness, numbness and headaches  Hematological: Negative  Psychiatric/Behavioral: Negative for confusion, decreased concentration, dysphoric mood and sleep disturbance  The patient is not nervous/anxious  Active Problem List     Patient Active Problem List   Diagnosis    Primary osteoarthritis of right knee    Benign prostatic hyperplasia    Glaucoma    Hypertension    Hyperlipidemia    Impaired fasting glucose    Nocturia    Rash    Cough       Objective   /74 (BP Location: Left arm, Patient Position: Sitting, Cuff Size: Large)   Pulse 64   Temp (!) 96 8 °F (36 °C) (Tympanic)   Ht 5' 9" (1 753 m)   Wt 116 kg (256 lb 3 2 oz)   SpO2 97%   BMI 37 83 kg/m²     Physical Exam   Constitutional: He is oriented to person, place, and time  He appears well-developed and well-nourished  No distress  HENT:   Head: Normocephalic and atraumatic  Neck: Neck supple  No JVD present  No tracheal deviation present  No thyromegaly present  Cardiovascular: Normal rate, regular rhythm, normal heart sounds and intact distal pulses  Exam reveals no gallop and no friction rub  No murmur heard  Pulmonary/Chest: Effort normal and breath sounds normal  He has no wheezes  He has no rales  He exhibits no tenderness  Abdominal: Soft  Bowel sounds are normal  He exhibits no distension and no mass  There is no tenderness  There is no rebound     Musculoskeletal: Normal range of motion  He exhibits no edema or tenderness  Lymphadenopathy:     He has no cervical adenopathy  Neurological: He is alert and oriented to person, place, and time  Skin: Skin is warm and dry  Psychiatric: He has a normal mood and affect   His behavior is normal  Judgment and thought content normal        Pertinent Laboratory/Diagnostic Studies:  Appointment on 01/14/2020   Component Date Value Ref Range Status    PSA, Diagnostic 01/14/2020 1 0  0 0 - 4 0 ng/mL Final   Office Visit on 12/20/2019   Component Date Value Ref Range Status     COLOR,UA 12/20/2019 yellow   Final    CLARITY,UA 12/20/2019 clear   Final    SPECIFIC GRAVITY,UA 12/20/2019 1 020   Final     PH,UA 12/20/2019 7 0   Final    LEUKOCYTE ESTERASE,UA 12/20/2019 neg   Final    NITRITE,UA 12/20/2019 neg   Final    GLUCOSE, UA 12/20/2019 neg   Final    KETONES,UA 12/20/2019 neg   Final    BILIRUBIN,UA 12/20/2019 neg   Final    BLOOD,UA 12/20/2019 neg   Final    POCT URINE PROTEIN 12/20/2019 neg   Final    SL AMB POCT UROBILINOGEN 12/20/2019 0 2   Final   Office Visit on 10/22/2019   Component Date Value Ref Range Status     RAPID STREP A 10/22/2019 Negative  Negative Final           Current Medications     Current Outpatient Medications:     amLODIPine (NORVASC) 5 mg tablet, Take 1 tablet (5 mg total) by mouth daily, Disp: 90 tablet, Rfl: 1    hydrochlorothiazide (HYDRODIURIL) 25 mg tablet, Take 1 tablet (25 mg total) by mouth daily, Disp: 90 tablet, Rfl: 1    losartan (COZAAR) 100 MG tablet, Take 1 tablet (100 mg total) by mouth daily, Disp: 90 tablet, Rfl: 0    metoprolol succinate (TOPROL-XL) 50 mg 24 hr tablet, Take 1 tablet (50 mg total) by mouth daily, Disp: 90 tablet, Rfl: 1    Multiple Vitamin (MULTI-VITAMIN DAILY PO), Take 1 tablet by mouth daily, Disp: , Rfl:     ROCKLATAN 0 02-0 005 % SOLN, , Disp: , Rfl: 4    spironolactone (ALDACTONE) 25 mg tablet, Take 1 tablet (25 mg total) by mouth daily, Disp: 90 tablet, Rfl: 3    tamsulosin (FLOMAX) 0 4 mg, Take 1 capsule (0 4 mg total) by mouth daily at bedtime, Disp: 90 capsule, Rfl: 1    latanoprost (XALATAN) 0 005 % ophthalmic solution, Administer 1 drop into the left eye daily at bedtime, Disp: , Rfl:       Hazel Ortiz MD

## 2020-01-28 DIAGNOSIS — I10 ESSENTIAL HYPERTENSION: ICD-10-CM

## 2020-01-28 RX ORDER — METOPROLOL SUCCINATE 50 MG/1
50 TABLET, EXTENDED RELEASE ORAL DAILY
Qty: 90 TABLET | Refills: 3 | Status: SHIPPED | OUTPATIENT
Start: 2020-01-28 | End: 2021-01-25 | Stop reason: SDUPTHER

## 2020-01-28 RX ORDER — LOSARTAN POTASSIUM 100 MG/1
100 TABLET ORAL DAILY
Qty: 90 TABLET | Refills: 3 | Status: SHIPPED | OUTPATIENT
Start: 2020-01-28 | End: 2021-01-25 | Stop reason: SDUPTHER

## 2020-01-28 RX ORDER — AMLODIPINE BESYLATE 5 MG/1
5 TABLET ORAL DAILY
Qty: 90 TABLET | Refills: 3 | Status: SHIPPED | OUTPATIENT
Start: 2020-01-28 | End: 2021-01-25 | Stop reason: SDUPTHER

## 2020-02-03 PROCEDURE — 88305 TISSUE EXAM BY PATHOLOGIST: CPT | Performed by: STUDENT IN AN ORGANIZED HEALTH CARE EDUCATION/TRAINING PROGRAM

## 2020-02-04 ENCOUNTER — LAB REQUISITION (OUTPATIENT)
Dept: LAB | Facility: HOSPITAL | Age: 70
End: 2020-02-04
Payer: COMMERCIAL

## 2020-02-04 DIAGNOSIS — D48.5 NEOPLASM OF UNCERTAIN BEHAVIOR OF SKIN: ICD-10-CM

## 2020-02-21 DIAGNOSIS — I10 ESSENTIAL HYPERTENSION: ICD-10-CM

## 2020-02-21 RX ORDER — HYDROCHLOROTHIAZIDE 25 MG/1
25 TABLET ORAL DAILY
Qty: 90 TABLET | Refills: 1 | Status: SHIPPED | OUTPATIENT
Start: 2020-02-21 | End: 2020-08-13 | Stop reason: SDUPTHER

## 2020-03-21 ENCOUNTER — OFFICE VISIT (OUTPATIENT)
Dept: URGENT CARE | Facility: MEDICAL CENTER | Age: 70
End: 2020-03-21
Payer: COMMERCIAL

## 2020-03-21 VITALS
OXYGEN SATURATION: 95 % | RESPIRATION RATE: 18 BRPM | HEART RATE: 74 BPM | HEIGHT: 69 IN | DIASTOLIC BLOOD PRESSURE: 72 MMHG | BODY MASS INDEX: 37.77 KG/M2 | TEMPERATURE: 96.8 F | SYSTOLIC BLOOD PRESSURE: 160 MMHG | WEIGHT: 255 LBS

## 2020-03-21 DIAGNOSIS — J20.8 ACUTE BACTERIAL BRONCHITIS: Primary | ICD-10-CM

## 2020-03-21 DIAGNOSIS — B96.89 ACUTE BACTERIAL BRONCHITIS: Primary | ICD-10-CM

## 2020-03-21 PROCEDURE — G0382 LEV 3 HOSP TYPE B ED VISIT: HCPCS | Performed by: PHYSICIAN ASSISTANT

## 2020-03-21 RX ORDER — AZITHROMYCIN 250 MG/1
TABLET, FILM COATED ORAL
Qty: 6 TABLET | Refills: 0 | Status: SHIPPED | OUTPATIENT
Start: 2020-03-21 | End: 2020-03-25

## 2020-03-21 RX ORDER — BENZONATATE 100 MG/1
100 CAPSULE ORAL 3 TIMES DAILY PRN
Qty: 20 CAPSULE | Refills: 0 | Status: SHIPPED | OUTPATIENT
Start: 2020-03-21 | End: 2020-03-27 | Stop reason: SDUPTHER

## 2020-03-21 NOTE — PATIENT INSTRUCTIONS
Take azithromycin as prescribed  Two tablets today, then 1 tablet for the next 4 days  Use Tessalon Perles up to 3 times a day as needed for cough  Use Flonase, 1 spray each nostril, once a day  Follow-up with PCP in 3-5 days if symptoms do not resolve  Go to the ER if symptoms become severe  Acute Bronchitis   WHAT YOU NEED TO KNOW:   Acute bronchitis is swelling and irritation in the air passages of your lungs  This irritation may cause you to cough or have other breathing problems  Acute bronchitis often starts because of another illness, such as a cold or the flu  The illness spreads from your nose and throat to your windpipe and airways  Bronchitis is often called a chest cold  Acute bronchitis lasts about 3 to 6 weeks and is usually not a serious illness  Your cough can last for several weeks  DISCHARGE INSTRUCTIONS:   Return to the emergency department if:   · You cough up blood  · Your lips or fingernails turn blue  · You feel like you are not getting enough air when you breathe  Contact your healthcare provider if:   · You have a fever  · Your breathing problems do not go away or get worse  · Your cough does not get better within 4 weeks  · You have questions or concerns about your condition or care  Self-care:   · Get more rest   Rest helps your body to heal  Slowly start to do more each day  Rest when you feel it is needed  · Avoid irritants in the air  Avoid chemicals, fumes, and dust  Wear a face mask if you must work around dust or fumes  Stay inside on days when air pollution levels are high  If you have allergies, stay inside when pollen counts are high  Do not use aerosol products, such as spray-on deodorant, bug spray, and hair spray  · Do not smoke or be around others who smoke  Nicotine and other chemicals in cigarettes and cigars damages the cilia that move mucus out of your lungs   Ask your healthcare provider for information if you currently smoke and need help to quit  E-cigarettes or smokeless tobacco still contain nicotine  Talk to your healthcare provider before you use these products  · Drink liquids as directed  Liquids help keep your air passages moist and help you cough up mucus  You may need to drink more liquids when you have acute bronchitis  Ask how much liquid to drink each day and which liquids are best for you  · Use a humidifier or vaporizer  Use a cool mist humidifier or a vaporizer to increase air moisture in your home  This may make it easier for you to breathe and help decrease your cough  Decrease risk for acute bronchitis:   · Get the vaccinations you need  Ask your healthcare provider if you should get vaccinated against the flu or pneumonia  · Prevent the spread of germs  You can decrease your risk of acute bronchitis and other illnesses by doing the following:     [facility] [place] often with soap and water  Carry germ-killing hand lotion or gel with you  You can use the lotion or gel to clean your hands when soap and water are not available  ¨ Do not touch your eyes, nose, or mouth unless you have washed your hands first     ¨ Always cover your mouth when you cough to prevent the spread of germs  It is best to cough into a tissue or your shirt sleeve instead of into your hand  Ask those around you cover their mouths when they cough  ¨ Try to avoid people who have a cold or the flu  If you are sick, stay away from others as much as possible  Medicines: Your healthcare provider may  give you any of the following:  · Ibuprofen or acetaminophen  are medicines that help lower your fever  They are available without a doctor's order  Ask your healthcare provider which medicine is right for you  Ask how much to take and how often to take it  Follow directions  These medicines can cause stomach bleeding if not taken correctly  Ibuprofen can cause kidney damage   Do not take ibuprofen if you have kidney disease, an ulcer, or allergies to aspirin  Acetaminophen can cause liver damage  Do not take more than 4,000 milligrams in 24 hours  · Decongestants  help loosen mucus in your lungs and make it easier to cough up  This can help you breathe easier  · Cough suppressants  decrease your urge to cough  If your cough produces mucus, do not take a cough suppressant unless your healthcare provider tells you to  Your healthcare provider may suggest that you take a cough suppressant at night so you can rest     · Inhalers  may be given  Your healthcare provider may give you one or more inhalers to help you breathe easier and cough less  An inhaler gives your medicine to open your airways  Ask your healthcare provider to show you how to use your inhaler correctly  · Take your medicine as directed  Contact your healthcare provider if you think your medicine is not helping or if you have side effects  Tell him of her if you are allergic to any medicine  Keep a list of the medicines, vitamins, and herbs you take  Include the amounts, and when and why you take them  Bring the list or the pill bottles to follow-up visits  Carry your medicine list with you in case of an emergency  Follow up with your healthcare provider as directed:  Write down questions you have so you will remember to ask them during your follow-up visits  © 2017 [employer] Information is for End User's use only and may not be sold, redistributed or otherwise used for commercial purposes  All illustrations and images included in CareNotes® are the copyrighted property of [employer]  or [employer]  The above information is an  only  It is not intended as medical advice for individual conditions or treatments  Talk to your doctor, nurse or pharmacist before following any medical regimen to see if it is safe and effective for you

## 2020-03-21 NOTE — PROGRESS NOTES
[facility]        NAME: Juan José Mcgrath is a 77 y o  male  : 1950    MRN: [id number]  DATE: 2020  TIME: 12:55 PM    Assessment and Plan   Acute bacterial bronchitis [J20 8, B96 89]  1  Acute bacterial bronchitis  azithromycin (ZITHROMAX) 250 mg tablet    benzonatate (TESSALON PERLES) 100 mg capsule         Patient Instructions     Take azithromycin as prescribed  Two tablets today, then 1 tablet for the next 4 days  Use Tessalon Perles up to 3 times a day as needed for cough  Use Flonase, 1 spray each nostril, once a day  Follow-up with PCP in 3-5 days if symptoms do not resolve  Go to the ER if symptoms become severe  Chief Complaint     Chief Complaint   Patient presents with    Cough     Patient has a lingering productive cough that has been going on since January  He denies fever, chills, body aches  He notes that he is supposed to get a chest x ray  History of Present Illness       Patient states that his granddaughter, daughter, and wife all had sinusitis and were on abx Feb-March      Cough   This is a new problem  The current episode started more than 1 month ago (since january)  The problem has been unchanged  The cough is productive of sputum (green mucus)  Associated symptoms include postnasal drip and rhinorrhea  Pertinent negatives include no chest pain, chills, ear congestion, ear pain, eye redness, fever, headaches, hemoptysis, myalgias, nasal congestion, rash, sore throat, shortness of breath or wheezing  Associated symptoms comments: Patient relates the cough comes and goes    The symptoms are aggravated by lying down  He has tried nothing for the symptoms  There is no history of asthma or environmental allergies  Review of Systems   Review of Systems   Constitutional: Negative for activity change, appetite change, chills, diaphoresis, fatigue and fever  HENT: Positive for postnasal drip and rhinorrhea   Negative for congestion, ear discharge, ear pain, hearing loss, sinus pressure, sinus pain, sore throat and trouble swallowing  Eyes: Negative for pain, discharge, redness and itching  Respiratory: Positive for cough  Negative for hemoptysis, chest tightness, shortness of breath, wheezing and stridor  Cardiovascular: Negative for chest pain, palpitations and leg swelling  Gastrointestinal: Negative for abdominal distention, abdominal pain, diarrhea, nausea and vomiting  Musculoskeletal: Negative for arthralgias, myalgias, neck pain and neck stiffness  Skin: Negative for color change, pallor and rash  Allergic/Immunologic: Negative for environmental allergies  Neurological: Negative for dizziness, syncope, weakness, light-headedness, numbness and headaches           Current Medications       Current Outpatient Medications:     amLODIPine (NORVASC) 5 mg tablet, Take 1 tablet (5 mg total) by mouth daily, Disp: 90 tablet, Rfl: 3    hydrochlorothiazide (HYDRODIURIL) 25 mg tablet, Take 1 tablet (25 mg total) by mouth daily, Disp: 90 tablet, Rfl: 1    losartan (COZAAR) 100 MG tablet, Take 1 tablet (100 mg total) by mouth daily, Disp: 90 tablet, Rfl: 3    metoprolol succinate (TOPROL-XL) 50 mg 24 hr tablet, Take 1 tablet (50 mg total) by mouth daily, Disp: 90 tablet, Rfl: 3    Multiple Vitamin (MULTI-VITAMIN DAILY PO), Take 1 tablet by mouth daily, Disp: , Rfl:     ROCKLATAN 0 02-0 005 % SOLN, , Disp: , Rfl: 4    spironolactone (ALDACTONE) 25 mg tablet, Take 1 tablet (25 mg total) by mouth daily, Disp: 90 tablet, Rfl: 3    tamsulosin (FLOMAX) 0 4 mg, Take 1 capsule (0 4 mg total) by mouth daily at bedtime, Disp: 90 capsule, Rfl: 1    azithromycin (ZITHROMAX) 250 mg tablet, Take 2 tablets today then 1 tablet daily x 4 days, Disp: 6 tablet, Rfl: 0    benzonatate (TESSALON PERLES) 100 mg capsule, Take 1 capsule (100 mg total) by mouth 3 (three) times a day as needed for cough, Disp: 20 capsule, Rfl: 0    latanoprost (XALATAN) 0 005 % ophthalmic solution, Administer 1 drop into the left eye daily at bedtime, Disp: , Rfl:     Current Allergies     Allergies as of 03/21/2020 - Reviewed 03/21/2020   Allergen Reaction Noted    Celebrex [celecoxib] GI Intolerance 07/14/2017    Influenza vaccines  07/14/2017            The following portions of the patient's history were reviewed and updated as appropriate: allergies, current medications, past family history, past medical history, past social history, past surgical history and problem list      Past Medical History:   Diagnosis Date    Exposure to hepatitis     treated with medication    Hearing loss of aging     History of blood transfusion     as a child    History of total left knee replacement     Hx of exposure to tuberculosis     treated in past with meds    Hyperlipidemia     Hypertension     OA (osteoarthritis)     bea  knees    Ocular hypertension of left eye     Use of cane as ambulatory aid     Wears glasses        Past Surgical History:   Procedure Laterality Date    CATARACT EXTRACTION Bilateral     COLONOSCOPY      JOINT REPLACEMENT Left     knee    [place] TOTAL KNEE ARTHROPLASTY Left 7/14/2017    Procedure: ARTHROPLASTY KNEE TOTAL;  Surgeon: Deonte Patel MD;  Location: [facility];  Service: Orthopedics    [place] TOTAL KNEE ARTHROPLASTY Right 9/5/2017    Procedure: ARTHROPLASTY KNEE TOTAL;  Surgeon: Deonte Patel MD;  Location[facility];  Service: Orthopedics    TONSILLECTOMY      WISDOM TOOTH EXTRACTION         Family History   Problem Relation Age of Onset    Diabetes Mother     COPD Father     Cancer Father         Gastric    Cancer Paternal Uncle         Gastric    Breast cancer Family     Thyroid cancer Family          Medications have been verified          Objective   /72   Pulse 74   Temp (!) 96 8 °F (36 °C) (Tympanic)   Resp 18   Ht 5' 9" (1 753 m)   Wt 116 kg (255 lb)   SpO2 95%   BMI 37 66 kg/m²        Physical Exam     Physical Exam Constitutional: He is oriented to person, place, and time  He appears well-developed and well-nourished  No distress  HENT:   Head: Normocephalic and atraumatic  Right Ear: Hearing, tympanic membrane, external ear and ear canal normal    Left Ear: Hearing, tympanic membrane, external ear and ear canal normal    Mouth/Throat: Oropharynx is clear and moist  No oropharyngeal exudate, posterior oropharyngeal edema or posterior oropharyngeal erythema  Neck: Normal range of motion  Neck supple  Cardiovascular: Normal rate, regular rhythm and normal heart sounds  No murmur heard  Pulmonary/Chest: Effort normal and breath sounds normal  No respiratory distress  He has no wheezes  Lymphadenopathy:     He has no cervical adenopathy  Neurological: He is alert and oriented to person, place, and time  Skin: Skin is warm  No rash noted  He is not diaphoretic  Psychiatric: He has a normal mood and affect  His behavior is normal    Nursing note and vitals reviewed

## 2020-03-21 NOTE — LETTER
March 21, 2020     Patient: Apurva Moreno   YOB: 1950   Date of Visit: 3/21/2020       To Whom it May Concern:    Apurva Moreno was seen in my clinic on 3/21/2020  He may return to work on 3/30/2020  If you have any questions or concerns, please don't hesitate to call           Sincerely,          Sharyn Guerrier PA-C        CC: No Recipients

## 2020-03-23 ENCOUNTER — TELEMEDICINE (OUTPATIENT)
Dept: INTERNAL MEDICINE CLINIC | Facility: CLINIC | Age: 70
End: 2020-03-23
Payer: COMMERCIAL

## 2020-03-23 DIAGNOSIS — Z20.828 EXPOSURE TO SARS-ASSOCIATED CORONAVIRUS: ICD-10-CM

## 2020-03-23 DIAGNOSIS — R05.9 COUGH: Primary | ICD-10-CM

## 2020-03-23 PROCEDURE — 99442 PR PHYS/QHP TELEPHONE EVALUATION 11-20 MIN: CPT | Performed by: INTERNAL MEDICINE

## 2020-03-23 NOTE — PROGRESS NOTES
COVID-19 Virtual Visit     This virtual check-in was done via telephone  He was seen at the urgent care for cough and congestion on Saturday, prescribed azithromycin and Tessalon  He notes that this morning he checked his temperature and he was 99 5, has been feeling achy and has a mild headache  No chest pain or tightness or wheezing or shortness of breath  Has been taking the azithromycin and Tessalon  Advised him to continue to do so, avoid contact with other people, ample fluids and call us if symptoms get worse  No indication for COVID testing at this point  Encounter provider Gurpreet Sotelo MD    Provider located at [facility] [id number]    Recent Visits  No visits were found meeting these conditions  Showing recent visits within past 7 days and meeting all other requirements     Future Appointments  No visits were found meeting these conditions  Showing future appointments within next 150 days and meeting all other requirements        Patient agrees to participate in a virtual check in via telephone or video visit instead of presenting to the office to address urgent/immediate medical needs  Patient is aware this is a billable service  After connecting through [employer], the patient was identified by name and date of birth  Lyndy Paget was informed that this was a telemedicine visit and that the exam was being conducted confidentially over secure lines  My office door was closed  No one else was in the room  Lyndy Paget acknowledged consent and understanding of privacy and security of the telemedicine visit  I informed the patient that I have reviewed his record in Epic and presented the opportunity for him to ask any questions regarding the visit today  The patient agreed to participate  Lyndy Paget is a 77 y o  male who is concerned about COVID-19  He reports cough   He has not traveled outside the [place]  within the last 14 days    He has not had contact with a person who is under investigation for or who is positive for COVID-19 within the last 14 days  He has not been hospitalized recently for fever and/or lower respiratory symptoms      Past Medical History:   Diagnosis Date    Exposure to hepatitis     treated with medication    Hearing loss of aging     History of blood transfusion     as a child    History of total left knee replacement     Hx of exposure to tuberculosis     treated in past with meds    Hyperlipidemia     Hypertension     OA (osteoarthritis)     bea  knees    Ocular hypertension of left eye     Use of cane as ambulatory aid     Wears glasses        Past Surgical History:   Procedure Laterality Date    CATARACT EXTRACTION Bilateral     COLONOSCOPY      JOINT REPLACEMENT Left     knee    [place] TOTAL KNEE ARTHROPLASTY Left 7/14/2017    Procedure: ARTHROPLASTY KNEE TOTAL;  Surgeon: Karina Rae MD;  [facility];  Service: Orthopedics    [place] TOTAL KNEE ARTHROPLASTY Right 9/5/2017    Procedure: ARTHROPLASTY KNEE TOTAL;  Surgeon: Karina Rae MD;  [facility];  Service: Orthopedics    TONSILLECTOMY      WISDOM TOOTH EXTRACTION         Current Outpatient Medications   Medication Sig Dispense Refill    amLODIPine (NORVASC) 5 mg tablet Take 1 tablet (5 mg total) by mouth daily 90 tablet 3    azithromycin (ZITHROMAX) 250 mg tablet Take 2 tablets today then 1 tablet daily x 4 days 6 tablet 0    benzonatate (TESSALON PERLES) 100 mg capsule Take 1 capsule (100 mg total) by mouth 3 (three) times a day as needed for cough 20 capsule 0    hydrochlorothiazide (HYDRODIURIL) 25 mg tablet Take 1 tablet (25 mg total) by mouth daily 90 tablet 1    latanoprost (XALATAN) 0 005 % ophthalmic solution Administer 1 drop into the left eye daily at bedtime      losartan (COZAAR) 100 MG tablet Take 1 tablet (100 mg total) by mouth daily 90 tablet 3    metoprolol succinate (TOPROL-XL) 50 mg 24 hr tablet Take 1 tablet (50 mg total) by mouth daily 90 tablet 3    Multiple Vitamin (MULTI-VITAMIN DAILY PO) Take 1 tablet by mouth daily      ROCKLATAN 0 02-0 005 % SOLN   4    spironolactone (ALDACTONE) 25 mg tablet Take 1 tablet (25 mg total) by mouth daily 90 tablet 3    tamsulosin (FLOMAX) 0 4 mg Take 1 capsule (0 4 mg total) by mouth daily at bedtime 90 capsule 1     No current facility-administered medications for this visit  Allergies   Allergen Reactions    Celebrex [Celecoxib] GI Intolerance     Nausea - pt able to tolerate with Prevacid    Influenza Vaccines        "I get the flu or feel horrible for months"       Video Exam    Jameson Durant appears telephone visit but no distress while talking to me        Disposition:      I recommended self-quarantine for 14 days and to call back for worsening symptoms or development of shortness of breath  I spent 15 minutes with the patient during this virtual check-in visit    COVID-19 Virtual Visit

## 2020-03-27 DIAGNOSIS — B96.89 ACUTE BACTERIAL BRONCHITIS: ICD-10-CM

## 2020-03-27 DIAGNOSIS — J20.8 ACUTE BACTERIAL BRONCHITIS: ICD-10-CM

## 2020-03-27 RX ORDER — BENZONATATE 100 MG/1
100 CAPSULE ORAL 3 TIMES DAILY PRN
Qty: 30 CAPSULE | Refills: 0 | Status: SHIPPED | OUTPATIENT
Start: 2020-03-27 | End: 2020-04-16 | Stop reason: ALTCHOICE

## 2020-04-16 ENCOUNTER — TELEMEDICINE (OUTPATIENT)
Dept: INTERNAL MEDICINE CLINIC | Facility: CLINIC | Age: 70
End: 2020-04-16
Payer: COMMERCIAL

## 2020-04-16 VITALS — HEIGHT: 69 IN | WEIGHT: 249 LBS | BODY MASS INDEX: 36.88 KG/M2

## 2020-04-16 DIAGNOSIS — E78.5 HYPERLIPIDEMIA, UNSPECIFIED HYPERLIPIDEMIA TYPE: ICD-10-CM

## 2020-04-16 DIAGNOSIS — N40.1 BENIGN PROSTATIC HYPERPLASIA WITH NOCTURIA: ICD-10-CM

## 2020-04-16 DIAGNOSIS — R35.1 BENIGN PROSTATIC HYPERPLASIA WITH NOCTURIA: ICD-10-CM

## 2020-04-16 DIAGNOSIS — J40 BRONCHITIS: ICD-10-CM

## 2020-04-16 DIAGNOSIS — I10 ESSENTIAL HYPERTENSION: Primary | ICD-10-CM

## 2020-04-16 PROCEDURE — 99213 OFFICE O/P EST LOW 20 MIN: CPT | Performed by: INTERNAL MEDICINE

## 2020-05-19 DIAGNOSIS — Z03.818 ENCNTR FOR OBS FOR SUSP EXPSR TO OTH BIOLG AGENTS RULED OUT: Primary | ICD-10-CM

## 2020-05-20 ENCOUNTER — APPOINTMENT (OUTPATIENT)
Dept: LAB | Facility: MEDICAL CENTER | Age: 70
End: 2020-05-20
Payer: COMMERCIAL

## 2020-05-20 DIAGNOSIS — Z03.818 ENCNTR FOR OBS FOR SUSP EXPSR TO OTH BIOLG AGENTS RULED OUT: ICD-10-CM

## 2020-05-20 PROCEDURE — 86769 SARS-COV-2 COVID-19 ANTIBODY: CPT

## 2020-05-21 LAB
SARS-COV-2 IGG SERPL QL IA: NEGATIVE
SARS-COV-2 IGM SERPL QL IA: NEGATIVE

## 2020-07-17 DIAGNOSIS — N40.1 BENIGN PROSTATIC HYPERPLASIA WITH LOWER URINARY TRACT SYMPTOMS, SYMPTOM DETAILS UNSPECIFIED: ICD-10-CM

## 2020-07-20 RX ORDER — TAMSULOSIN HYDROCHLORIDE 0.4 MG/1
0.4 CAPSULE ORAL
Qty: 90 CAPSULE | Refills: 0 | Status: SHIPPED | OUTPATIENT
Start: 2020-07-20 | End: 2020-10-26 | Stop reason: SDUPTHER

## 2020-08-13 DIAGNOSIS — I10 ESSENTIAL HYPERTENSION: ICD-10-CM

## 2020-08-13 RX ORDER — HYDROCHLOROTHIAZIDE 25 MG/1
25 TABLET ORAL DAILY
Qty: 90 TABLET | Refills: 3 | Status: SHIPPED | OUTPATIENT
Start: 2020-08-13 | End: 2021-08-05 | Stop reason: SDUPTHER

## 2020-08-31 DIAGNOSIS — I10 HYPERTENSION, UNSPECIFIED TYPE: ICD-10-CM

## 2020-09-01 RX ORDER — SPIRONOLACTONE 25 MG/1
25 TABLET ORAL DAILY
Qty: 90 TABLET | Refills: 3 | Status: SHIPPED | OUTPATIENT
Start: 2020-09-01 | End: 2021-09-07 | Stop reason: SDUPTHER

## 2020-10-26 DIAGNOSIS — N40.1 BENIGN PROSTATIC HYPERPLASIA WITH LOWER URINARY TRACT SYMPTOMS, SYMPTOM DETAILS UNSPECIFIED: ICD-10-CM

## 2020-10-27 RX ORDER — TAMSULOSIN HYDROCHLORIDE 0.4 MG/1
0.4 CAPSULE ORAL
Qty: 90 CAPSULE | Refills: 3 | Status: SHIPPED | OUTPATIENT
Start: 2020-10-27 | End: 2021-10-19 | Stop reason: SDUPTHER

## 2020-11-23 ENCOUNTER — TELEPHONE (OUTPATIENT)
Dept: INTERNAL MEDICINE CLINIC | Facility: CLINIC | Age: 70
End: 2020-11-23

## 2020-11-23 DIAGNOSIS — Z20.822 EXPOSURE TO COVID-19 VIRUS: ICD-10-CM

## 2020-11-23 DIAGNOSIS — Z20.822 EXPOSURE TO COVID-19 VIRUS: Primary | ICD-10-CM

## 2020-11-23 PROCEDURE — U0003 INFECTIOUS AGENT DETECTION BY NUCLEIC ACID (DNA OR RNA); SEVERE ACUTE RESPIRATORY SYNDROME CORONAVIRUS 2 (SARS-COV-2) (CORONAVIRUS DISEASE [COVID-19]), AMPLIFIED PROBE TECHNIQUE, MAKING USE OF HIGH THROUGHPUT TECHNOLOGIES AS DESCRIBED BY CMS-2020-01-R: HCPCS | Performed by: INTERNAL MEDICINE

## 2020-11-24 LAB — SARS-COV-2 RNA SPEC QL NAA+PROBE: DETECTED

## 2020-11-25 ENCOUNTER — TELEMEDICINE (OUTPATIENT)
Dept: INTERNAL MEDICINE CLINIC | Facility: CLINIC | Age: 70
End: 2020-11-25
Payer: COMMERCIAL

## 2020-11-25 DIAGNOSIS — U07.1 COVID-19 VIRUS INFECTION: Primary | ICD-10-CM

## 2020-11-25 PROCEDURE — 99213 OFFICE O/P EST LOW 20 MIN: CPT | Performed by: INTERNAL MEDICINE

## 2020-12-01 PROBLEM — U07.1 COVID-19 VIRUS INFECTION: Status: ACTIVE | Noted: 2020-12-01

## 2020-12-03 ENCOUNTER — TELEMEDICINE (OUTPATIENT)
Dept: INTERNAL MEDICINE CLINIC | Facility: CLINIC | Age: 70
End: 2020-12-03
Payer: COMMERCIAL

## 2020-12-03 DIAGNOSIS — U07.1 COVID-19 VIRUS INFECTION: Primary | ICD-10-CM

## 2020-12-03 DIAGNOSIS — I10 ESSENTIAL HYPERTENSION: ICD-10-CM

## 2020-12-03 PROCEDURE — 99213 OFFICE O/P EST LOW 20 MIN: CPT | Performed by: INTERNAL MEDICINE

## 2020-12-10 NOTE — ASSESSMENT & PLAN NOTE
AUA symptom score is 11 on tamsulosin  Urinalysis is negative  He is satisfied with his voiding pattern  I recommended fluid restriction in the evening to help with the nocturia  We discussed urolift as a minimally invasive form of therapy that might allow him to get off of prostate medication and provide greater symptom relief  Information was given and he will consider this option  If he does not wish to proceed with urolift, he will return in 1 year  He will have his PSA level done in the next few weeks  Seen/examined. agree with above. I personally saw and examined the patient in detail.  I have spoken to the above provider regarding the assessment and plan of care.  I reviewed the above assessment and plan of care, and agree.  I have made changes in the body of the note where appropriate. I saw and examined the patient personally. Spoke with above provider regarding this case. I reviewed the above findings completely.  I agree with the above history, physical, and plan which I have edited where appropriate. Dispo: Pt eval ordered 73 male with PMHx of CVA ([phone]), dementia, CAD s/p CABGx4, DM2, HTN, BIBA 2/2 AMS and agitation, admitted for metabolic encephalopathy likely due to HTN-metabolic  encephalopathy and advancing dementia, uncontrolled hypothyroid, CVA ruled out Plan: bp control as no CVA, apprec neuro and cardio collanoration, monitor clinical course, dc planning for QUINTEN

## 2021-01-22 DIAGNOSIS — Z23 ENCOUNTER FOR IMMUNIZATION: ICD-10-CM

## 2021-01-25 DIAGNOSIS — I10 ESSENTIAL HYPERTENSION: ICD-10-CM

## 2021-01-26 RX ORDER — METOPROLOL SUCCINATE 50 MG/1
50 TABLET, EXTENDED RELEASE ORAL DAILY
Qty: 90 TABLET | Refills: 3 | Status: SHIPPED | OUTPATIENT
Start: 2021-01-26 | End: 2021-10-19 | Stop reason: SDUPTHER

## 2021-01-26 RX ORDER — LOSARTAN POTASSIUM 100 MG/1
100 TABLET ORAL DAILY
Qty: 90 TABLET | Refills: 3 | Status: SHIPPED | OUTPATIENT
Start: 2021-01-26 | End: 2021-10-19 | Stop reason: SDUPTHER

## 2021-01-26 RX ORDER — AMLODIPINE BESYLATE 5 MG/1
5 TABLET ORAL DAILY
Qty: 90 TABLET | Refills: 3 | Status: SHIPPED | OUTPATIENT
Start: 2021-01-26 | End: 2021-03-11 | Stop reason: SDUPTHER

## 2021-03-11 ENCOUNTER — OFFICE VISIT (OUTPATIENT)
Dept: INTERNAL MEDICINE CLINIC | Facility: CLINIC | Age: 71
End: 2021-03-11
Payer: COMMERCIAL

## 2021-03-11 VITALS
TEMPERATURE: 97.6 F | OXYGEN SATURATION: 97 % | WEIGHT: 242.2 LBS | SYSTOLIC BLOOD PRESSURE: 122 MMHG | HEART RATE: 63 BPM | RESPIRATION RATE: 18 BRPM | HEIGHT: 69 IN | BODY MASS INDEX: 35.87 KG/M2 | DIASTOLIC BLOOD PRESSURE: 68 MMHG

## 2021-03-11 DIAGNOSIS — E66.01 CLASS 2 SEVERE OBESITY DUE TO EXCESS CALORIES WITH SERIOUS COMORBIDITY AND BODY MASS INDEX (BMI) OF 35.0 TO 35.9 IN ADULT (HCC): ICD-10-CM

## 2021-03-11 DIAGNOSIS — I10 ESSENTIAL HYPERTENSION: Primary | ICD-10-CM

## 2021-03-11 DIAGNOSIS — R73.01 IMPAIRED FASTING GLUCOSE: ICD-10-CM

## 2021-03-11 DIAGNOSIS — U07.1 COVID-19 VIRUS INFECTION: ICD-10-CM

## 2021-03-11 DIAGNOSIS — E78.5 HYPERLIPIDEMIA, UNSPECIFIED HYPERLIPIDEMIA TYPE: ICD-10-CM

## 2021-03-11 PROBLEM — E66.812 CLASS 2 SEVERE OBESITY DUE TO EXCESS CALORIES WITH SERIOUS COMORBIDITY AND BODY MASS INDEX (BMI) OF 35.0 TO 35.9 IN ADULT (HCC): Status: ACTIVE | Noted: 2021-03-11

## 2021-03-11 PROCEDURE — 99214 OFFICE O/P EST MOD 30 MIN: CPT | Performed by: INTERNAL MEDICINE

## 2021-03-11 RX ORDER — MULTIVITAMIN
TABLET ORAL
COMMUNITY
End: 2021-10-19

## 2021-03-11 RX ORDER — AMLODIPINE BESYLATE 5 MG/1
TABLET ORAL
COMMUNITY
Start: 2017-07-20 | End: 2021-03-11 | Stop reason: SDUPTHER

## 2021-03-11 RX ORDER — ZINC GLUCONATE 50 MG
50 TABLET ORAL DAILY
COMMUNITY
End: 2022-08-10

## 2021-03-11 RX ORDER — MELATONIN
1000 DAILY
COMMUNITY

## 2021-03-11 RX ORDER — MULTIVIT WITH MINERALS/LUTEIN
1000 TABLET ORAL DAILY
COMMUNITY

## 2021-03-11 RX ORDER — PNV NO.95/FERROUS FUM/FOLIC AC 28MG-0.8MG
TABLET ORAL
COMMUNITY
Start: 2017-08-28 | End: 2021-10-19

## 2021-03-18 NOTE — PROGRESS NOTES
[facility] [place]      NAME: Princess Mcneal  AGE: 72 y o  SEX: male  : 1950   MRN: [id number]    DATE: 3/18/2021  TIME: 12:39 PM    Assessment and Plan   1  Essential hypertension    Well controlled  2  Hyperlipidemia, unspecified hyperlipidemia type    Adequately controlled on diet  3  Impaired fasting glucose    Continue diet  4  COVID-19 virus infection    Patient has recovered from this  He has not developed any persistent symptoms  5  Class 2 severe obesity due to excess calories with serious comorbidity and body mass index (BMI) of 35 0 to 35 9 in adult ([facility])  BMI Counseling: Body mass index is 35 77 kg/m²  The BMI is above normal  Nutrition recommendations include reducing portion sizes and decreasing overall calorie intake  Exercise recommendations include moderate aerobic physical activity for 150 minutes/week  The patient is doing pretty well  He has completely recovered from [place]  He has started his COVID vaccination  His blood pressure, etcetera are satisfactorily controlled  He will continue his current medications  He opens to start exercising more as the weather improves  Return to office in: Three months    Chief Complaint     Chief Complaint   Patient presents with    Follow-up     3 Months    Hypertension       History of Present Illness      the patient returned to the office for re-evaluation of hypertension, hyperlipidemia, and impaired fasting glucose  Earlier this winter he developed COVID  He was moderately ill but did not have to be hospitalized  He said that he felt run down for quite a while but has regained his usual strength and energy  He has no persistent respiratory symptoms  He has had no chest pain, shortness of breath, palpitations, etcetera  He is tolerating his medications well      The following portions of the patient's history were reviewed and updated as appropriate: allergies, current medications, past family history, past medical history, past social history, past surgical history and problem list     Review of Systems   Review of Systems   Constitutional: Negative  HENT: Negative for congestion, ear pain, postnasal drip, rhinorrhea, sore throat and trouble swallowing  Eyes: Negative for pain, discharge, redness and visual disturbance  Respiratory: Negative for cough, shortness of breath and wheezing  Cardiovascular: Negative  Gastrointestinal: Negative  Endocrine: Negative  Genitourinary: Negative for difficulty urinating, dysuria, frequency, hematuria and urgency  Musculoskeletal: Negative for arthralgias, gait problem, joint swelling and myalgias  Skin: Negative for rash  Neurological: Negative for dizziness, speech difficulty, weakness, light-headedness, numbness and headaches  Hematological: Negative  Psychiatric/Behavioral: Negative for confusion, decreased concentration, dysphoric mood and sleep disturbance  The patient is not nervous/anxious  Active Problem List     Patient Active Problem List   Diagnosis    Primary osteoarthritis of right knee    Benign prostatic hyperplasia    Glaucoma    Hypertension    Hyperlipidemia    Impaired fasting glucose    Nocturia    Rash    Cough    COVID-19 virus infection    Class 2 severe obesity due to excess calories with serious comorbidity and body mass index (BMI) of 35 0 to 35 9 in adult ([facility])       Objective   /68 (BP Location: Left arm, Patient Position: Sitting, Cuff Size: Large)   Pulse 63   Temp 97 6 °F (36 4 °C) (Temporal)   Resp 18   Ht 5' 9" (1 753 m)   Wt 110 kg (242 lb 3 2 oz)   SpO2 97%   BMI 35 77 kg/m²     Physical Exam  Constitutional:       Appearance: He is well-developed  HENT:      Head: Normocephalic and atraumatic  Neck:      Musculoskeletal: Neck supple  Thyroid: No thyromegaly  Vascular: No JVD  Trachea: No tracheal deviation     Cardiovascular:      Rate and Rhythm: Normal rate and regular rhythm  Heart sounds: Normal heart sounds  No murmur  No friction rub  No gallop  Pulmonary:      Effort: Pulmonary effort is normal       Breath sounds: Normal breath sounds  No wheezing or rales  Chest:      Chest wall: No tenderness  Abdominal:      General: Bowel sounds are normal  There is no distension  Palpations: Abdomen is soft  There is no mass  Tenderness: There is no abdominal tenderness  There is no rebound  Musculoskeletal: Normal range of motion  General: No tenderness  Lymphadenopathy:      Cervical: No cervical adenopathy  Skin:     General: Skin is warm and dry  Neurological:      Mental Status: He is alert and oriented to person, place, and time  Psychiatric:         Mood and Affect: Mood normal          Behavior: Behavior normal          Thought Content: Thought content normal          Judgment: Judgment normal          Pertinent Laboratory/Diagnostic Studies:  No visits with results within 3 Month(s) from this visit     Latest known visit with results is:   Orders Only on 11/23/2020   Component Date Value Ref Range Status    SARS-CoV-2  11/23/2020 Detected* Not Detected Final           Current Medications     Current Outpatient Medications:     Ascorbic Acid (vitamin C) 1000 MG tablet, Take 1,000 mg by mouth daily, Disp: , Rfl:     cholecalciferol (VITAMIN D3) 1,000 units tablet, Take 1,000 Units by mouth daily, Disp: , Rfl:     hydrochlorothiazide (HYDRODIURIL) 25 mg tablet, Take 1 tablet (25 mg total) by mouth daily, Disp: 90 tablet, Rfl: 3    losartan (COZAAR) 100 MG tablet, Take 1 tablet (100 mg total) by mouth daily, Disp: 90 tablet, Rfl: 3    metoprolol succinate (TOPROL-XL) 50 mg 24 hr tablet, Take 1 tablet (50 mg total) by mouth daily, Disp: 90 tablet, Rfl: 3    Multiple Vitamin (MULTI-VITAMIN DAILY PO), Take 1 tablet by mouth daily, Disp: , Rfl:     ROCKLATAN 0 02-0 005 % SOLN, , Disp: , Rfl: 4    spironolactone (ALDACTONE) 25 mg tablet, Take 1 tablet (25 mg total) by mouth daily, Disp: 90 tablet, Rfl: 3    tamsulosin (FLOMAX) 0 4 mg, Take 1 capsule (0 4 mg total) by mouth daily at bedtime, Disp: 90 capsule, Rfl: 3    zinc gluconate 50 mg tablet, Take 50 mg by mouth daily, Disp: , Rfl:     Ferrous Sulfate (Iron) 325 (65 Fe) MG TABS, Take by mouth, Disp: , Rfl:     latanoprost (XALATAN) 0 005 % ophthalmic solution, Administer 1 drop into the left eye daily at bedtime, Disp: , Rfl:     Multiple Vitamin (Multi-Vitamin Daily) TABS, Take by mouth, Disp: , Rfl:       Fani Sherwood MD

## 2021-03-26 ENCOUNTER — OFFICE VISIT (OUTPATIENT)
Dept: UROLOGY | Facility: MEDICAL CENTER | Age: 71
End: 2021-03-26
Payer: COMMERCIAL

## 2021-03-26 VITALS
BODY MASS INDEX: 35.55 KG/M2 | HEIGHT: 69 IN | SYSTOLIC BLOOD PRESSURE: 148 MMHG | WEIGHT: 240 LBS | DIASTOLIC BLOOD PRESSURE: 88 MMHG

## 2021-03-26 DIAGNOSIS — R35.1 BENIGN PROSTATIC HYPERPLASIA WITH NOCTURIA: Primary | ICD-10-CM

## 2021-03-26 DIAGNOSIS — N40.1 BENIGN PROSTATIC HYPERPLASIA WITH NOCTURIA: Primary | ICD-10-CM

## 2021-03-26 PROCEDURE — 99213 OFFICE O/P EST LOW 20 MIN: CPT | Performed by: UROLOGY

## 2021-03-26 NOTE — PROGRESS NOTES
Assessment/Plan:    Benign prostatic hyperplasia  AUA symptom score is 11 on tamsulosin  He is satisfied his voiding pattern  PSA last year was 1 0  We discussed minimally invasive treatment for his LUTS  He prefers to continue medical therapy  We will check a PSA  level and he will return in 1 year  Diagnoses and all orders for this visit:    Benign prostatic hyperplasia with nocturia  -     PSA Total, Diagnostic; Future  -     Urinalysis with microscopic; Future    Other orders  -     AMLODIPINE BESYLATE PO; Take 5 mg by mouth          Subjective:      Patient ID: Thierry Wheeler is a 72 y o  male  Benign Prostatic Hypertrophy  This is a chronic problem  The current episode started more than 1 year ago  The problem has been gradually improving since onset  Irritative symptoms include nocturia (Improved 1-2 X) and urgency (occasional)  Irritative symptoms do not include frequency  Obstructive symptoms include dribbling and a weak stream  Obstructive symptoms do not include incomplete emptying, an intermittent stream, a slower stream or straining  Pertinent negatives include no chills, dysuria, genital pain, hematuria, hesitancy, nausea or vomiting  AUA score is 8-19  His sexual activity is non-contributory to the current illness  The symptoms are aggravated by caffeine  Past treatments include tamsulosin  The treatment provided moderate relief  He has been using treatment for 2 or more years  The following portions of the patient's history were reviewed and updated as appropriate: allergies, current medications, past family history, past medical history, past social history, past surgical history and problem list     Review of Systems   Constitutional: Negative for chills, diaphoresis, fatigue and fever  HENT: Positive for hearing loss  Eyes: Negative  Respiratory: Negative  He snores   Cardiovascular: Negative  Gastrointestinal: Negative  Negative for nausea and vomiting  Endocrine: Negative  Genitourinary: Positive for nocturia (Improved 1-2 X) and urgency (occasional)  Negative for dysuria, frequency, hematuria, hesitancy and incomplete emptying  See HPI   Musculoskeletal: Positive for myalgias  Skin: Negative  Allergic/Immunologic: Negative  Neurological: Negative  Hematological: Negative  Psychiatric/Behavioral: Negative  AUA SYMPTOM SCORE      Most Recent Value   AUA SYMPTOM SCORE   How often have you had a sensation of not emptying your bladder completely after you finished urinating? 1   How often have you had to urinate again less than two hours after you finished urinating? 2   How often have you found you stopped and started again several times when you urinate? 1   How often have you found it difficult to postpone urination? 1   How often have you had a weak urinary stream?  3   How often have you had to push or strain to begin urination? 0   How many times did you most typically get up to urinate from the time you went to bed at night until the time you got up in the morning? 3   Quality of Life: If you were to spend the rest of your life with your urinary condition just the way it is now, how would you feel about that?  2   AUA SYMPTOM SCORE  11          Objective:      /88 (BP Location: Left arm, Patient Position: Sitting, Cuff Size: Adult)   Ht 5' 9" (1 753 m)   Wt 109 kg (240 lb)   BMI 35 44 kg/m²          Physical Exam  Vitals signs reviewed  Constitutional:       General: He is not in acute distress  Appearance: Normal appearance  He is well-developed  He is obese  He is not ill-appearing, toxic-appearing or diaphoretic  HENT:      Head: Normocephalic and atraumatic  Eyes:      General: No scleral icterus  Conjunctiva/sclera: Conjunctivae normal    Neck:      Musculoskeletal: Neck supple  Cardiovascular:      Rate and Rhythm: Normal rate     Pulmonary:      Effort: Pulmonary effort is normal  Abdominal:      General: Bowel sounds are normal  There is no distension  Palpations: Abdomen is soft  There is no mass  Tenderness: There is no abdominal tenderness  There is no right CVA tenderness, left CVA tenderness, guarding or rebound  Hernia: No hernia is present  Genitourinary:     Penis: Normal  No phimosis or hypospadias  Scrotum/Testes: Normal          Right: Mass not present  Left: Mass not present  Rectum: Normal       Comments: Prostate 1 5 X to 2 X  enlarged and palpably benign  Musculoskeletal: Normal range of motion  Skin:     General: Skin is warm and dry  Neurological:      General: No focal deficit present  Mental Status: He is alert and oriented to person, place, and time  Psychiatric:         Mood and Affect: Mood normal          Behavior: Behavior normal          Thought Content:  Thought content normal          Judgment: Judgment normal

## 2021-03-26 NOTE — ASSESSMENT & PLAN NOTE
AUA symptom score is 11 on tamsulosin  He is satisfied his voiding pattern  PSA last year was 1 0  We discussed minimally invasive treatment for his LUTS  He prefers to continue medical therapy  We will check a PSA  level and he will return in 1 year

## 2021-03-26 NOTE — PATIENT INSTRUCTIONS
Enlarged Prostate (BPH)   WHAT YOU NEED TO KNOW:   An enlarged prostate (BPH) develops because the number of prostate cells increases (hyperplasia) or the cells get bigger (hypertrophy)  BPH causes urinary system problems that can get worse over time  You can work with healthcare providers and take steps to manage BPH  DISCHARGE INSTRUCTIONS:   Call your doctor or urologist if:   · You see blood in your urine  · You are not able to urinate  · Your bladder feels very full and painful  · You have new or worsening symptoms  · You have a fever  · You have questions or concerns about your condition or care  Medicines: You may need any of the following:  · Medicines  may be used to relax the muscles in your prostate and bladder  This may help you urinate more easily  Medicines may also be used to block the production of a hormone that causes the prostate to get larger  It may help to slow the growth of the prostate or shrink the prostate  · Diuretics  (water pills) may be used to relieve fluid buildup  You will need to take these in the morning or afternoon because they may cause you to urinate more often  Do not take them before bedtime  · Take your medicine as directed  Contact your healthcare provider if you think your medicine is not helping or if you have side effects  Tell him or her if you are allergic to any medicine  Keep a list of the medicines, vitamins, and herbs you take  Include the amounts, and when and why you take them  Bring the list or the pill bottles to follow-up visits  Carry your medicine list with you in case of an emergency  What you can do to manage BPH:   · Urinate on a regular schedule  This will train your bladder to hold urine longer  A larger amount of urine may make it easier to urinate  · Talk to your healthcare provider about all your medicines  This includes prescription and over-the-counter medicines  Some medicines can make BPH worse   Do not start any new medicines before you talk to your provider  · Drink less liquid during the day  Do not have liquid for several hours before you go to bed at night  Do not drink large amounts of any liquid at one time  · Limit alcohol and caffeine  These can irritate your bladder and make your symptoms worse  · Eat less salt  Salt can cause fluid buildup and make it harder to urinate  Examples of salty foods are chips, cured meats, and canned soups  Do not use table salt  · Elevate your legs if you have swelling  Elevate (raise) your legs above the level of your heart  This can relieve swelling caused by fluid buildup  You may not have to get up in the night to urinate  · Lose weight if you are overweight  Obesity increases your risk for obstructive sleep apnea (ELIDA)  ELIDA can make you need to get up in the night to urinate  Exercise can help you reach or maintain a healthy weight  A lack of exercise may make BPH symptoms worse  Aim to get at least 30 minutes of exercise on most days of the week  Follow up with your doctor or urologist as directed:  Write down your questions so you remember to ask them during your visits  © Copyright [address] [place] [address] Drive Information is for End User's use only and may not be sold, redistributed or otherwise used for commercial purposes  All illustrations and images included in CareNotes® are the copyrighted property of [employer]  or [facility] [employer]hy Mckeon   The above information is an  only  It is not intended as medical advice for individual conditions or treatments  Talk to your doctor, nurse or pharmacist before following any medical regimen to see if it is safe and effective for you

## 2021-05-04 ENCOUNTER — TELEPHONE (OUTPATIENT)
Dept: INTERNAL MEDICINE CLINIC | Facility: CLINIC | Age: 71
End: 2021-05-04

## 2021-05-04 DIAGNOSIS — I10 ESSENTIAL HYPERTENSION: Primary | ICD-10-CM

## 2021-05-04 RX ORDER — AMLODIPINE BESYLATE 5 MG/1
5 TABLET ORAL DAILY
Qty: 30 TABLET | Refills: 3 | Status: CANCELLED | OUTPATIENT
Start: 2021-05-04

## 2021-05-07 DIAGNOSIS — I10 ESSENTIAL HYPERTENSION: Primary | ICD-10-CM

## 2021-05-13 RX ORDER — AMLODIPINE BESYLATE 5 MG/1
5 TABLET ORAL DAILY
Qty: 90 TABLET | Refills: 1 | Status: SHIPPED | OUTPATIENT
Start: 2021-05-13 | End: 2021-10-19 | Stop reason: SDUPTHER

## 2021-06-05 ENCOUNTER — OFFICE VISIT (OUTPATIENT)
Dept: URGENT CARE | Facility: MEDICAL CENTER | Age: 71
End: 2021-06-05
Payer: COMMERCIAL

## 2021-06-05 VITALS
TEMPERATURE: 98.6 F | HEART RATE: 67 BPM | DIASTOLIC BLOOD PRESSURE: 71 MMHG | SYSTOLIC BLOOD PRESSURE: 143 MMHG | OXYGEN SATURATION: 96 % | RESPIRATION RATE: 18 BRPM

## 2021-06-05 DIAGNOSIS — T14.8XXA PUNCTURE WOUND: Primary | ICD-10-CM

## 2021-06-05 PROCEDURE — 90471 IMMUNIZATION ADMIN: CPT | Performed by: PHYSICIAN ASSISTANT

## 2021-06-05 PROCEDURE — 90715 TDAP VACCINE 7 YRS/> IM: CPT

## 2021-06-05 PROCEDURE — G0382 LEV 3 HOSP TYPE B ED VISIT: HCPCS | Performed by: PHYSICIAN ASSISTANT

## 2021-06-05 RX ORDER — CIPROFLOXACIN 500 MG/1
500 TABLET, FILM COATED ORAL EVERY 12 HOURS SCHEDULED
Qty: 20 TABLET | Refills: 0 | Status: SHIPPED | OUTPATIENT
Start: 2021-06-05 | End: 2021-06-15

## 2021-06-05 NOTE — PATIENT INSTRUCTIONS
Puncture Wound   WHAT YOU NEED TO KNOW:   A puncture wound is a hole in the skin made by a sharp, pointed object  The area may be bruised or swollen  You may have bleeding, pain, or trouble moving the affected area  DISCHARGE INSTRUCTIONS:   Return to the emergency department if:   · You have severe pain  · You have numbness or tingling in the area of your wound  · Your wound starts bleeding and does not stop, even after you apply pressure  Call your doctor if:   · You have new drainage or a bad odor coming from the wound  · You have a fever or chills  · You have increased swelling, redness, or pain  · You have red streaks on your skin coming from your wound  · You have questions or concerns about your condition or care  Medicines: You may need any of the following:  · NSAIDs , such as ibuprofen, help decrease swelling, pain, and fever  This medicine is available with or without a doctor's order  NSAIDs can cause stomach bleeding or kidney problems in certain people  If you take blood thinner medicine, always ask your healthcare provider if NSAIDs are safe for you  Always read the medicine label and follow directions  · Antibiotics  help prevent a bacterial infection  · Take your medicine as directed  Contact your healthcare provider if you think your medicine is not helping or if you have side effects  Tell him of her if you are allergic to any medicine  Keep a list of the medicines, vitamins, and herbs you take  Include the amounts, and when and why you take them  Bring the list or the pill bottles to follow-up visits  Carry your medicine list with you in case of an emergency  Care for your wound as directed:  Keep your wound clean and dry  When you are allowed to bathe, carefully wash the wound with soap and water  Dry the area and put on new, clean bandages as directed  Change your bandages when they get wet or dirty    Rest and elevate  the injured area above the level of your heart as often as you can  This will help decrease swelling and pain  Prop your injured area on pillows or blankets to keep it elevated comfortably  Follow up with your doctor in 2 to 3 days:  Write down your questions so you remember to ask them during your visits  © Copyright [address] Information is for End User's use only and may not be sold, redistributed or otherwise used for commercial purposes  All illustrations and images included in CareNotes® are the copyrighted property of [employer]  or [facility] Ruthy Mckeon   The above information is an  only  It is not intended as medical advice for individual conditions or treatments  Talk to your doctor, nurse or pharmacist before following any medical regimen to see if it is safe and effective for you

## 2021-06-05 NOTE — PROGRESS NOTES
[facility]        NAME: Jemal Conway is a 72 y o  male  : 1950    MRN: [id number]  DATE: 2021  TIME: 11:43 AM    /71   Pulse 67   Temp 98 6 °F (37 °C)   Resp 18   SpO2 96%     Assessment and Plan   Puncture wound [T14  8XXA]  1  Puncture wound  TDAP Vaccine greater than or equal to 6yo    ciprofloxacin (CIPRO) 500 mg tablet         Patient Instructions       Follow up with PCP in 3-5 days  Proceed to  ER if symptoms worsen  Chief Complaint   No chief complaint on file  History of Present Illness       About 1 1/2 hours ago pt with step onto screw with crocks no socks       Review of Systems   Review of Systems   Constitutional: Negative  HENT: Negative  Eyes: Negative  Respiratory: Negative  Cardiovascular: Negative  Gastrointestinal: Negative  Endocrine: Negative  Genitourinary: Negative  Musculoskeletal: Negative  Allergic/Immunologic: Negative  Neurological: Negative  Hematological: Negative  Psychiatric/Behavioral: Negative  All other systems reviewed and are negative          Current Medications       Current Outpatient Medications:     amLODIPine (NORVASC) 5 mg tablet, Take 1 tablet (5 mg total) by mouth daily, Disp: 90 tablet, Rfl: 1    Ascorbic Acid (vitamin C) 1000 MG tablet, Take 1,000 mg by mouth daily, Disp: , Rfl:     cholecalciferol (VITAMIN D3) 1,000 units tablet, Take 1,000 Units by mouth daily, Disp: , Rfl:     ciprofloxacin (CIPRO) 500 mg tablet, Take 1 tablet (500 mg total) by mouth every 12 (twelve) hours for 10 days, Disp: 20 tablet, Rfl: 0    Ferrous Sulfate (Iron) 325 (65 Fe) MG TABS, Take by mouth, Disp: , Rfl:     hydrochlorothiazide (HYDRODIURIL) 25 mg tablet, Take 1 tablet (25 mg total) by mouth daily, Disp: 90 tablet, Rfl: 3    latanoprost (XALATAN) 0 005 % ophthalmic solution, Administer 1 drop into the left eye daily at bedtime, Disp: , Rfl:     losartan (COZAAR) 100 MG tablet, Take 1 tablet (100 mg total) by mouth daily, Disp: 90 tablet, Rfl: 3    metoprolol succinate (TOPROL-XL) 50 mg 24 hr tablet, Take 1 tablet (50 mg total) by mouth daily, Disp: 90 tablet, Rfl: 3    Multiple Vitamin (MULTI-VITAMIN DAILY PO), Take 1 tablet by mouth daily, Disp: , Rfl:     Multiple Vitamin (Multi-Vitamin Daily) TABS, Take by mouth, Disp: , Rfl:     ROCKLATAN 0 02-0 005 % SOLN, , Disp: , Rfl: 4    spironolactone (ALDACTONE) 25 mg tablet, Take 1 tablet (25 mg total) by mouth daily, Disp: 90 tablet, Rfl: 3    tamsulosin (FLOMAX) 0 4 mg, Take 1 capsule (0 4 mg total) by mouth daily at bedtime, Disp: 90 capsule, Rfl: 3    zinc gluconate 50 mg tablet, Take 50 mg by mouth daily, Disp: , Rfl:     Current Allergies     Allergies as of 06/05/2021 - Reviewed 06/05/2021   Allergen Reaction Noted    Celebrex [celecoxib] GI Intolerance 07/14/2017    Influenza vaccines  07/14/2017            The following portions of the patient's history were reviewed and updated as appropriate: allergies, current medications, past family history, past medical history, past social history, past surgical history and problem list      Past Medical History:   Diagnosis Date    Exposure to hepatitis     treated with medication    Hearing loss of aging     History of blood transfusion     as a child    History of total left knee replacement     HL (hearing loss)     Hx of exposure to tuberculosis     treated in past with meds    Hyperlipidemia     Hypertension     OA (osteoarthritis)     bea  knees    Ocular hypertension of left eye     Tinnitus     Use of cane as ambulatory aid     Wears glasses        Past Surgical History:   Procedure Laterality Date    ADENOIDECTOMY      CATARACT EXTRACTION Bilateral     COLONOSCOPY      JOINT REPLACEMENT Left     knee    NV TOTAL KNEE ARTHROPLASTY Left 7/14/2017    Procedure: ARTHROPLASTY KNEE TOTAL;  Surgeon: Maurilio Zuniga MD;  Location: [facility];  Service: Orthopedics    NV TOTAL KNEE ARTHROPLASTY Right 9/5/2017    Procedure: ARTHROPLASTY KNEE TOTAL;  Surgeon: Allen Cabral MD;  Location: [facility];  Service: Orthopedics    TONSILLECTOMY      WISDOM TOOTH EXTRACTION         Family History   Problem Relation Age of Onset    Diabetes Mother     COPD Father     Cancer Father         Gastric    Cancer Paternal Uncle         Gastric    Breast cancer Family     Thyroid cancer Family          Medications have been verified  Objective   /71   Pulse 67   Temp 98 6 °F (37 °C)   Resp 18   SpO2 96%        Physical Exam     Physical Exam  Vitals signs and nursing note reviewed  Constitutional:       Appearance: Normal appearance  He is normal weight  Comments: Pt declines xrays    HENT:      Head: Normocephalic and atraumatic  Ears:      Comments: +hearing aids  bilat      Nose: Nose normal       Mouth/Throat:      Mouth: Mucous membranes are moist       Pharynx: Oropharynx is clear  Eyes:      Extraocular Movements: Extraocular movements intact  Conjunctiva/sclera: Conjunctivae normal       Pupils: Pupils are equal, round, and reactive to light  Neck:      Musculoskeletal: Normal range of motion and neck supple  Cardiovascular:      Rate and Rhythm: Normal rate  Pulses: Normal pulses  Heart sounds: Normal heart sounds  Pulmonary:      Effort: Pulmonary effort is normal       Breath sounds: Normal breath sounds  Abdominal:      General: Abdomen is flat  Musculoskeletal: Normal range of motion  Skin:     General: Skin is warm  Capillary Refill: Capillary refill takes less than 2 seconds  Neurological:      General: No focal deficit present  Mental Status: He is alert and oriented to person, place, and time     Psychiatric:         Mood and Affect: Mood normal

## 2021-06-10 ENCOUNTER — TRANSCRIBE ORDERS (OUTPATIENT)
Dept: ADMINISTRATIVE | Facility: HOSPITAL | Age: 71
End: 2021-06-10

## 2021-06-10 ENCOUNTER — APPOINTMENT (OUTPATIENT)
Dept: LAB | Facility: MEDICAL CENTER | Age: 71
End: 2021-06-10
Attending: UROLOGY
Payer: COMMERCIAL

## 2021-06-10 DIAGNOSIS — R35.1 BPH ASSOCIATED WITH NOCTURIA: ICD-10-CM

## 2021-06-10 DIAGNOSIS — N40.1 BPH ASSOCIATED WITH NOCTURIA: Primary | ICD-10-CM

## 2021-06-10 DIAGNOSIS — R35.1 BPH ASSOCIATED WITH NOCTURIA: Primary | ICD-10-CM

## 2021-06-10 DIAGNOSIS — R35.1 NOCTURIA: ICD-10-CM

## 2021-06-10 DIAGNOSIS — N40.1 BPH ASSOCIATED WITH NOCTURIA: ICD-10-CM

## 2021-06-10 LAB
BACTERIA UR QL AUTO: NORMAL /HPF
BILIRUB UR QL STRIP: NEGATIVE
CLARITY UR: CLEAR
COLOR UR: YELLOW
GLUCOSE UR STRIP-MCNC: NEGATIVE MG/DL
HGB UR QL STRIP.AUTO: NEGATIVE
HYALINE CASTS #/AREA URNS LPF: NORMAL /LPF
KETONES UR STRIP-MCNC: NEGATIVE MG/DL
LEUKOCYTE ESTERASE UR QL STRIP: NEGATIVE
NITRITE UR QL STRIP: NEGATIVE
NON-SQ EPI CELLS URNS QL MICRO: NORMAL /HPF
PH UR STRIP.AUTO: 6 [PH]
PROT UR STRIP-MCNC: NEGATIVE MG/DL
PSA SERPL-MCNC: 1.1 NG/ML (ref 0–4)
RBC #/AREA URNS AUTO: NORMAL /HPF
SP GR UR STRIP.AUTO: 1.02 (ref 1–1.03)
UROBILINOGEN UR QL STRIP.AUTO: 0.2 E.U./DL
WBC #/AREA URNS AUTO: NORMAL /HPF

## 2021-06-10 PROCEDURE — 81001 URINALYSIS AUTO W/SCOPE: CPT | Performed by: UROLOGY

## 2021-06-10 PROCEDURE — 84153 ASSAY OF PSA TOTAL: CPT

## 2021-06-15 ENCOUNTER — OFFICE VISIT (OUTPATIENT)
Dept: INTERNAL MEDICINE CLINIC | Facility: CLINIC | Age: 71
End: 2021-06-15
Payer: COMMERCIAL

## 2021-06-15 VITALS
HEART RATE: 66 BPM | TEMPERATURE: 97.5 F | WEIGHT: 246 LBS | HEIGHT: 69 IN | RESPIRATION RATE: 12 BRPM | DIASTOLIC BLOOD PRESSURE: 70 MMHG | BODY MASS INDEX: 36.43 KG/M2 | SYSTOLIC BLOOD PRESSURE: 138 MMHG

## 2021-06-15 DIAGNOSIS — Z23 ENCOUNTER FOR IMMUNIZATION: ICD-10-CM

## 2021-06-15 DIAGNOSIS — N40.1 BENIGN PROSTATIC HYPERPLASIA WITH NOCTURIA: ICD-10-CM

## 2021-06-15 DIAGNOSIS — R35.1 BENIGN PROSTATIC HYPERPLASIA WITH NOCTURIA: ICD-10-CM

## 2021-06-15 DIAGNOSIS — E78.5 HYPERLIPIDEMIA, UNSPECIFIED HYPERLIPIDEMIA TYPE: ICD-10-CM

## 2021-06-15 DIAGNOSIS — R10.31 RIGHT INGUINAL PAIN: ICD-10-CM

## 2021-06-15 DIAGNOSIS — E66.01 CLASS 2 SEVERE OBESITY DUE TO EXCESS CALORIES WITH SERIOUS COMORBIDITY AND BODY MASS INDEX (BMI) OF 35.0 TO 35.9 IN ADULT (HCC): ICD-10-CM

## 2021-06-15 DIAGNOSIS — R73.01 IMPAIRED FASTING GLUCOSE: ICD-10-CM

## 2021-06-15 DIAGNOSIS — I10 ESSENTIAL HYPERTENSION: Primary | ICD-10-CM

## 2021-06-15 PROCEDURE — 99397 PER PM REEVAL EST PAT 65+ YR: CPT | Performed by: INTERNAL MEDICINE

## 2021-06-15 PROCEDURE — 90471 IMMUNIZATION ADMIN: CPT | Performed by: INTERNAL MEDICINE

## 2021-06-15 PROCEDURE — 90732 PPSV23 VACC 2 YRS+ SUBQ/IM: CPT | Performed by: INTERNAL MEDICINE

## 2021-06-15 NOTE — PROGRESS NOTES
[facility] [place]      NAME: Robbie Suero  AGE: 72 y o  SEX: male  : 1950   MRN: [id number]    DATE: 6/15/2021  TIME: 1:04 PM    Assessment and Plan   1  Essential hypertension    Well controlled  - CBC  - Comprehensive metabolic panel    2  Impaired fasting glucose    This has been stable on diet  The patient is due for hemoglobin A1c   - Hemoglobin A1C    3  Benign prostatic hyperplasia with nocturia    Symptoms are adequately controlled on tamsulosin  4  Hyperlipidemia    Continue diet and exercise  Check lipid profile  - Lipid panel    5  Class 2 severe obesity due to excess calories with serious comorbidity and body mass index (BMI) of 35 0 to 35 9 in [facility])    Continue diet and exercise  6  Right inguinal pain    Etiology for this is obscure  CT scan will be obtained  - CT abdomen pelvis w contrast; Future    7  Encounter for immunization  - PNEUMOCOCCAL POLYSACCHARIDE VACCINE 23-VALENT =>1YO SQ IM      The patient is doing well overall  His lifestyle is generally healthy  He does not smoke, drink to excess, or use illicit drugs  He is trying to watch his diet although he does struggle with his weight  He had not been exercising for a while but now has resumed a walking program   He has no symptoms of psychiatric or cognitive dysfunction  He has no problems with his activities of daily living  He is up-to-date with appropriate health screening studies  He is up-to-date with his tetanus shot  He was given  Pneumovax today to complete his pneumococcal vaccine series  He had a COVID-19 vaccine  He is intolerant influenza vaccines  I suggested that he consider a herpes zoster vaccine  Return to office in:   3 to 4 months      Chief Complaint     Chief Complaint   Patient presents with    Annual Exam       History of Present Illness       The patient returned to the office for re-evaluation of hypertension, hyperlipidemia, impaired fasting glucose, BPH, and obesity  Since his last visit he has been feeling pretty well  He has had some pain in his right groin area  He palpates no abnormality in the area  His bowels have been moving well  He has had no urinary symptoms  It is not predictably changed by movement  He has had no chest pain, shortness of breath, palpitations, or dizziness  He has no issues with his medications  The following portions of the patient's history were reviewed and updated as appropriate: allergies, current medications, past family history, past medical history, past social history, past surgical history and problem list     Review of Systems   Review of Systems   Constitutional: Negative  HENT: Negative for congestion, ear pain, postnasal drip, rhinorrhea, sore throat and trouble swallowing  Eyes: Negative for pain, discharge, redness and visual disturbance  Respiratory: Negative for cough, shortness of breath and wheezing  Cardiovascular: Negative  Gastrointestinal: Negative  Endocrine: Negative  Genitourinary: Negative for difficulty urinating, dysuria, frequency, hematuria and urgency  Musculoskeletal: Negative for arthralgias, gait problem, joint swelling and myalgias  Skin: Negative for rash  Neurological: Negative for dizziness, speech difficulty, weakness, light-headedness, numbness and headaches  Hematological: Negative  Psychiatric/Behavioral: Negative for confusion, decreased concentration, dysphoric mood and sleep disturbance  The patient is not nervous/anxious          Active Problem List     Patient Active Problem List   Diagnosis    Primary osteoarthritis of right knee    Benign prostatic hyperplasia    Glaucoma    Hypertension    Hyperlipidemia    Impaired fasting glucose    Nocturia    Rash    Cough    COVID-19 virus infection    Class 2 severe obesity due to excess calories with serious comorbidity and body mass index (BMI) of 35 0 to 35 9 in adult [facility])    Right inguinal pain       Objective   /70 (BP Location: Left arm, Patient Position: Sitting, Cuff Size: Large)   Pulse 66   Temp 97 5 °F (36 4 °C)   Resp 12   Ht 5' 9" (1 753 m)   Wt 112 kg (246 lb)   BMI 36 33 kg/m²     Physical Exam  Constitutional:       General: He is not in acute distress  Appearance: He is well-developed  He is obese  HENT:      Head: Normocephalic and atraumatic  Neck:      Thyroid: No thyromegaly  Vascular: No JVD  Trachea: No tracheal deviation  Cardiovascular:      Rate and Rhythm: Normal rate and regular rhythm  Heart sounds: Normal heart sounds  No murmur heard  No friction rub  No gallop  Pulmonary:      Effort: Pulmonary effort is normal       Breath sounds: Normal breath sounds  No wheezing or rales  Chest:      Chest wall: No tenderness  Abdominal:      General: Bowel sounds are normal  There is no distension  Palpations: Abdomen is soft  There is no mass  Tenderness: There is no abdominal tenderness  There is no rebound  Musculoskeletal:         General: No tenderness  Normal range of motion  Cervical back: Neck supple  Lymphadenopathy:      Cervical: No cervical adenopathy  Skin:     General: Skin is warm and dry  Neurological:      Mental Status: He is alert and oriented to person, place, and time  Psychiatric:         Mood and Affect: Mood normal          Behavior: Behavior normal          Thought Content:  Thought content normal          Judgment: Judgment normal          Pertinent Laboratory/Diagnostic Studies:  Appointment on 06/10/2021   Component Date Value Ref Range Status    PSA, Diagnostic 06/10/2021 1 1  0 0 - 4 0 ng/mL Final   Transcribe Orders on 06/10/2021   Component Date Value Ref Range Status    Clarity,  06/10/2021 Clear   Final    Color,  06/10/2021 Yellow   Final    Specific Gravity,  06/10/2021 1 019  1 003 - 1 030 Final    pH,  06/10/2021 6 0  4 5, 5 0, 5 5, 6 0, 6 5, 7 0, 7 5, 8 0 Final    Glucose, UA 06/10/2021 Negative  Negative mg/dl Final    Ketones, UA 06/10/2021 Negative  Negative mg/dl Final    Blood, UA 06/10/2021 Negative  Negative Final    Protein, UA 06/10/2021 Negative  Negative mg/dl Final    Nitrite, UA 06/10/2021 Negative  Negative Final    Bilirubin, UA 06/10/2021 Negative  Negative Final    Urobilinogen, UA 06/10/2021 0 2  0 2, 1 0 E U /dl E U /dl Final    Leukocytes, UA 06/10/2021 Negative  Negative Final    WBC, UA 06/10/2021 None Seen  None Seen, 2-4 /hpf Final    RBC, UA 06/10/2021 None Seen  None Seen, 2-4 /hpf Final    Hyaline Casts, UA 06/10/2021 None Seen  None Seen /lpf Final    Bacteria, UA 06/10/2021 None Seen  None Seen, Occasional /hpf Final    Epithelial Cells 06/10/2021 None Seen  None Seen, Occasional /hpf Final           Current Medications     Current Outpatient Medications:     amLODIPine (NORVASC) 5 mg tablet, Take 1 tablet (5 mg total) by mouth daily, Disp: 90 tablet, Rfl: 1    Ascorbic Acid (vitamin C) 1000 MG tablet, Take 1,000 mg by mouth daily, Disp: , Rfl:     cholecalciferol (VITAMIN D3) 1,000 units tablet, Take 1,000 Units by mouth daily, Disp: , Rfl:     hydrochlorothiazide (HYDRODIURIL) 25 mg tablet, Take 1 tablet (25 mg total) by mouth daily, Disp: 90 tablet, Rfl: 3    latanoprost (XALATAN) 0 005 % ophthalmic solution, Administer 1 drop into the left eye daily at bedtime, Disp: , Rfl:     losartan (COZAAR) 100 MG tablet, Take 1 tablet (100 mg total) by mouth daily, Disp: 90 tablet, Rfl: 3    metoprolol succinate (TOPROL-XL) 50 mg 24 hr tablet, Take 1 tablet (50 mg total) by mouth daily, Disp: 90 tablet, Rfl: 3    Multiple Vitamin (MULTI-VITAMIN DAILY PO), Take 1 tablet by mouth daily, Disp: , Rfl:     spironolactone (ALDACTONE) 25 mg tablet, Take 1 tablet (25 mg total) by mouth daily, Disp: 90 tablet, Rfl: 3    tamsulosin (FLOMAX) 0 4 mg, Take 1 capsule (0 4 mg total) by mouth daily at bedtime, Disp: 90 capsule, Rfl: 3    zinc gluconate 50 mg tablet, Take 50 mg by mouth daily, Disp: , Rfl:     ciprofloxacin (CIPRO) 500 mg tablet, Take 1 tablet (500 mg total) by mouth every 12 (twelve) hours for 10 days, Disp: 20 tablet, Rfl: 0    Ferrous Sulfate (Iron) 325 (65 Fe) MG TABS, Take by mouth, Disp: , Rfl:     Multiple Vitamin (Multi-Vitamin Daily) TABS, Take by mouth, Disp: , Rfl:     ROCKLATAN 0 02-0 005 % SOLN, , Disp: , Rfl: 4      Ty MD Paz

## 2021-06-19 ENCOUNTER — APPOINTMENT (OUTPATIENT)
Dept: LAB | Facility: MEDICAL CENTER | Age: 71
End: 2021-06-19
Payer: COMMERCIAL

## 2021-06-19 LAB
ALBUMIN SERPL BCP-MCNC: 3.9 G/DL (ref 3.5–5)
ALP SERPL-CCNC: 87 U/L (ref 46–116)
ALT SERPL W P-5'-P-CCNC: 24 U/L (ref 12–78)
ANION GAP SERPL CALCULATED.3IONS-SCNC: 6 MMOL/L (ref 4–13)
AST SERPL W P-5'-P-CCNC: 18 U/L (ref 5–45)
BILIRUB SERPL-MCNC: 0.55 MG/DL (ref 0.2–1)
BUN SERPL-MCNC: 33 MG/DL (ref 5–25)
CALCIUM SERPL-MCNC: 9.1 MG/DL (ref 8.3–10.1)
CHLORIDE SERPL-SCNC: 107 MMOL/L (ref 100–108)
CHOLEST SERPL-MCNC: 165 MG/DL (ref 50–200)
CO2 SERPL-SCNC: 24 MMOL/L (ref 21–32)
CREAT SERPL-MCNC: 1.21 MG/DL (ref 0.6–1.3)
ERYTHROCYTE [DISTWIDTH] IN BLOOD BY AUTOMATED COUNT: 12.5 % (ref 11.6–15.1)
EST. AVERAGE GLUCOSE BLD GHB EST-MCNC: 128 MG/DL
GFR SERPL CREATININE-BSD FRML MDRD: 61 ML/MIN/1.73SQ M
GLUCOSE P FAST SERPL-MCNC: 118 MG/DL (ref 65–99)
HBA1C MFR BLD: 6.1 %
HCT VFR BLD AUTO: 36.4 % (ref 36.5–49.3)
HDLC SERPL-MCNC: 33 MG/DL
HGB BLD-MCNC: 12.1 G/DL (ref 12–17)
LDLC SERPL CALC-MCNC: 73 MG/DL (ref 0–100)
MCH RBC QN AUTO: 31.9 PG (ref 26.8–34.3)
MCHC RBC AUTO-ENTMCNC: 33.2 G/DL (ref 31.4–37.4)
MCV RBC AUTO: 96 FL (ref 82–98)
NONHDLC SERPL-MCNC: 132 MG/DL
PLATELET # BLD AUTO: 250 THOUSANDS/UL (ref 149–390)
PMV BLD AUTO: 11.1 FL (ref 8.9–12.7)
POTASSIUM SERPL-SCNC: 4.3 MMOL/L (ref 3.5–5.3)
PROT SERPL-MCNC: 7.7 G/DL (ref 6.4–8.2)
RBC # BLD AUTO: 3.79 MILLION/UL (ref 3.88–5.62)
SODIUM SERPL-SCNC: 137 MMOL/L (ref 136–145)
TRIGL SERPL-MCNC: 293 MG/DL
WBC # BLD AUTO: 6.63 THOUSAND/UL (ref 4.31–10.16)

## 2021-06-19 PROCEDURE — 80053 COMPREHEN METABOLIC PANEL: CPT | Performed by: INTERNAL MEDICINE

## 2021-06-19 PROCEDURE — 36415 COLL VENOUS BLD VENIPUNCTURE: CPT | Performed by: INTERNAL MEDICINE

## 2021-06-19 PROCEDURE — 80061 LIPID PANEL: CPT | Performed by: INTERNAL MEDICINE

## 2021-06-19 PROCEDURE — 83036 HEMOGLOBIN GLYCOSYLATED A1C: CPT | Performed by: INTERNAL MEDICINE

## 2021-06-19 PROCEDURE — 85027 COMPLETE CBC AUTOMATED: CPT | Performed by: INTERNAL MEDICINE

## 2021-08-05 DIAGNOSIS — I10 ESSENTIAL HYPERTENSION: ICD-10-CM

## 2021-08-09 RX ORDER — HYDROCHLOROTHIAZIDE 25 MG/1
25 TABLET ORAL DAILY
Qty: 90 TABLET | Refills: 3 | Status: ON HOLD | OUTPATIENT
Start: 2021-08-09 | End: 2022-08-10 | Stop reason: SDUPTHER

## 2021-09-07 DIAGNOSIS — I10 HYPERTENSION, UNSPECIFIED TYPE: ICD-10-CM

## 2021-09-07 RX ORDER — SPIRONOLACTONE 25 MG/1
25 TABLET ORAL DAILY
Qty: 90 TABLET | Refills: 0 | Status: SHIPPED | OUTPATIENT
Start: 2021-09-07 | End: 2021-10-19 | Stop reason: SDUPTHER

## 2021-09-23 DIAGNOSIS — U07.1 COVID-19: Primary | ICD-10-CM

## 2021-09-23 PROCEDURE — U0003 INFECTIOUS AGENT DETECTION BY NUCLEIC ACID (DNA OR RNA); SEVERE ACUTE RESPIRATORY SYNDROME CORONAVIRUS 2 (SARS-COV-2) (CORONAVIRUS DISEASE [COVID-19]), AMPLIFIED PROBE TECHNIQUE, MAKING USE OF HIGH THROUGHPUT TECHNOLOGIES AS DESCRIBED BY CMS-2020-01-R: HCPCS | Performed by: INTERNAL MEDICINE

## 2021-09-23 PROCEDURE — U0005 INFEC AGEN DETEC AMPLI PROBE: HCPCS | Performed by: INTERNAL MEDICINE

## 2021-09-23 NOTE — PROGRESS NOTES
Pt works at [employer] pt stated that he is not feeling well fever, body aches, run down and he would like to be tested for covid     Per Dr Ward Bright he can get tested

## 2021-09-24 ENCOUNTER — TELEPHONE (OUTPATIENT)
Dept: INTERNAL MEDICINE CLINIC | Facility: CLINIC | Age: 71
End: 2021-09-24

## 2021-09-24 NOTE — TELEPHONE ENCOUNTER
Spoke to patient and gave the negative result      ----- Message from [facility] sent at 9/23/2021  5:34 PM EDT -----   Negative COVID test

## 2021-10-06 DIAGNOSIS — R10.31 RIGHT INGUINAL PAIN: Primary | ICD-10-CM

## 2021-10-09 ENCOUNTER — APPOINTMENT (OUTPATIENT)
Dept: LAB | Facility: MEDICAL CENTER | Age: 71
End: 2021-10-09
Payer: COMMERCIAL

## 2021-10-09 DIAGNOSIS — R10.31 RIGHT INGUINAL PAIN: ICD-10-CM

## 2021-10-09 LAB
ANION GAP SERPL CALCULATED.3IONS-SCNC: 5 MMOL/L (ref 4–13)
BUN SERPL-MCNC: 30 MG/DL (ref 5–25)
CALCIUM SERPL-MCNC: 9.4 MG/DL (ref 8.3–10.1)
CHLORIDE SERPL-SCNC: 102 MMOL/L (ref 100–108)
CO2 SERPL-SCNC: 28 MMOL/L (ref 21–32)
CREAT SERPL-MCNC: 1.36 MG/DL (ref 0.6–1.3)
GFR SERPL CREATININE-BSD FRML MDRD: 53 ML/MIN/1.73SQ M
GLUCOSE SERPL-MCNC: 139 MG/DL (ref 65–140)
POTASSIUM SERPL-SCNC: 4.5 MMOL/L (ref 3.5–5.3)
SODIUM SERPL-SCNC: 135 MMOL/L (ref 136–145)

## 2021-10-09 PROCEDURE — 80048 BASIC METABOLIC PNL TOTAL CA: CPT

## 2021-10-09 PROCEDURE — 36415 COLL VENOUS BLD VENIPUNCTURE: CPT

## 2021-10-12 DIAGNOSIS — Z20.822 EXPOSURE TO COVID-19 VIRUS: Primary | ICD-10-CM

## 2021-10-12 PROCEDURE — U0003 INFECTIOUS AGENT DETECTION BY NUCLEIC ACID (DNA OR RNA); SEVERE ACUTE RESPIRATORY SYNDROME CORONAVIRUS 2 (SARS-COV-2) (CORONAVIRUS DISEASE [COVID-19]), AMPLIFIED PROBE TECHNIQUE, MAKING USE OF HIGH THROUGHPUT TECHNOLOGIES AS DESCRIBED BY CMS-2020-01-R: HCPCS | Performed by: INTERNAL MEDICINE

## 2021-10-12 PROCEDURE — U0005 INFEC AGEN DETEC AMPLI PROBE: HCPCS | Performed by: INTERNAL MEDICINE

## 2021-10-14 ENCOUNTER — HOSPITAL ENCOUNTER (OUTPATIENT)
Dept: CT IMAGING | Facility: HOSPITAL | Age: 71
Discharge: HOME | End: 2021-10-14
Attending: INTERNAL MEDICINE
Payer: COMMERCIAL

## 2021-10-14 DIAGNOSIS — R10.31 RIGHT INGUINAL PAIN: ICD-10-CM

## 2021-10-14 PROCEDURE — 74177 CT ABD & PELVIS W/CONTRAST: CPT

## 2021-10-14 PROCEDURE — G1004 CDSM NDSC: HCPCS

## 2021-10-14 RX ADMIN — IOHEXOL 100 ML: 350 INJECTION, SOLUTION INTRAVENOUS at 18:28

## 2021-10-15 ENCOUNTER — TELEPHONE (OUTPATIENT)
Dept: INTERNAL MEDICINE CLINIC | Facility: CLINIC | Age: 71
End: 2021-10-15

## 2021-10-19 ENCOUNTER — OFFICE VISIT (OUTPATIENT)
Dept: INTERNAL MEDICINE CLINIC | Facility: CLINIC | Age: 71
End: 2021-10-19
Payer: COMMERCIAL

## 2021-10-19 VITALS
DIASTOLIC BLOOD PRESSURE: 80 MMHG | TEMPERATURE: 97.3 F | HEIGHT: 69 IN | SYSTOLIC BLOOD PRESSURE: 148 MMHG | HEART RATE: 51 BPM | RESPIRATION RATE: 16 BRPM | WEIGHT: 249 LBS | BODY MASS INDEX: 36.88 KG/M2

## 2021-10-19 DIAGNOSIS — N40.1 BENIGN PROSTATIC HYPERPLASIA WITH LOWER URINARY TRACT SYMPTOMS, SYMPTOM DETAILS UNSPECIFIED: ICD-10-CM

## 2021-10-19 DIAGNOSIS — Z12.11 SCREENING FOR COLORECTAL CANCER: Primary | ICD-10-CM

## 2021-10-19 DIAGNOSIS — I10 ESSENTIAL HYPERTENSION: ICD-10-CM

## 2021-10-19 DIAGNOSIS — Z12.12 SCREENING FOR COLORECTAL CANCER: Primary | ICD-10-CM

## 2021-10-19 DIAGNOSIS — I10 HYPERTENSION, UNSPECIFIED TYPE: ICD-10-CM

## 2021-10-19 PROCEDURE — 99214 OFFICE O/P EST MOD 30 MIN: CPT | Performed by: INTERNAL MEDICINE

## 2021-10-19 RX ORDER — LOSARTAN POTASSIUM 100 MG/1
100 TABLET ORAL DAILY
Qty: 90 TABLET | Refills: 3 | Status: ON HOLD | OUTPATIENT
Start: 2021-10-19 | End: 2022-08-10 | Stop reason: SDUPTHER

## 2021-10-19 RX ORDER — TAMSULOSIN HYDROCHLORIDE 0.4 MG/1
0.4 CAPSULE ORAL
Qty: 90 CAPSULE | Refills: 3 | Status: SHIPPED | OUTPATIENT
Start: 2021-10-19

## 2021-10-19 RX ORDER — METOPROLOL SUCCINATE 50 MG/1
50 TABLET, EXTENDED RELEASE ORAL DAILY
Qty: 90 TABLET | Refills: 3 | Status: ON HOLD | OUTPATIENT
Start: 2021-10-19 | End: 2022-08-10 | Stop reason: SDUPTHER

## 2021-10-19 RX ORDER — AMLODIPINE BESYLATE 5 MG/1
5 TABLET ORAL DAILY
Qty: 90 TABLET | Refills: 3 | Status: SHIPPED | OUTPATIENT
Start: 2021-10-19 | End: 2022-08-10

## 2021-10-19 RX ORDER — SPIRONOLACTONE 25 MG/1
25 TABLET ORAL DAILY
Qty: 90 TABLET | Refills: 3 | Status: ON HOLD | OUTPATIENT
Start: 2021-10-19 | End: 2022-08-10 | Stop reason: SDUPTHER

## 2021-11-08 ENCOUNTER — CONSULT (OUTPATIENT)
Dept: GASTROENTEROLOGY | Facility: MEDICAL CENTER | Age: 71
End: 2021-11-08
Payer: COMMERCIAL

## 2021-11-08 VITALS
DIASTOLIC BLOOD PRESSURE: 70 MMHG | SYSTOLIC BLOOD PRESSURE: 114 MMHG | WEIGHT: 243.8 LBS | HEART RATE: 71 BPM | BODY MASS INDEX: 36 KG/M2

## 2021-11-08 DIAGNOSIS — Z12.11 SCREENING FOR COLORECTAL CANCER: Primary | ICD-10-CM

## 2021-11-08 DIAGNOSIS — Z12.12 SCREENING FOR COLORECTAL CANCER: Primary | ICD-10-CM

## 2021-11-08 PROCEDURE — 99243 OFF/OP CNSLTJ NEW/EST LOW 30: CPT | Performed by: INTERNAL MEDICINE

## 2022-01-07 ENCOUNTER — OFFICE VISIT (OUTPATIENT)
Dept: INTERNAL MEDICINE CLINIC | Facility: CLINIC | Age: 72
End: 2022-01-07
Payer: COMMERCIAL

## 2022-01-07 VITALS
DIASTOLIC BLOOD PRESSURE: 82 MMHG | WEIGHT: 241 LBS | OXYGEN SATURATION: 98 % | HEART RATE: 72 BPM | BODY MASS INDEX: 35.7 KG/M2 | HEIGHT: 69 IN | SYSTOLIC BLOOD PRESSURE: 126 MMHG | TEMPERATURE: 97.3 F

## 2022-01-07 DIAGNOSIS — L02.212 ABSCESS OF BACK: Primary | ICD-10-CM

## 2022-01-07 PROCEDURE — 99213 OFFICE O/P EST LOW 20 MIN: CPT | Performed by: INTERNAL MEDICINE

## 2022-01-07 RX ORDER — SULFAMETHOXAZOLE AND TRIMETHOPRIM 800; 160 MG/1; MG/1
1 TABLET ORAL EVERY 12 HOURS SCHEDULED
Qty: 14 TABLET | Refills: 0 | Status: SHIPPED | OUTPATIENT
Start: 2022-01-07 | End: 2022-01-14

## 2022-01-07 NOTE — PROGRESS NOTES
INTERNAL MEDICINE OFFICE VISIT  [facility] Internal [facility]- [place]    NAME: Compa Linn  AGE: 80 y o  SEX: male    DATE OF ENCOUNTER: 1/7/2022    Assessment and Plan     1  Abscess of back  -bill comes in today for evaluation of probable infected sebaceous cyst  -he states that the cyst has been present for the past 12-15 years  Initially started as a blackhead  He has been following with a dermatologist   He began to develop pain in the area around [place]  Over the past few days he has developed redness, his wife was able to extrude some yellowish/pus material from the area  He is systemically well appearing  -he had a CT of the abdomen and pelvis completed in October of 2021 which did show a posterior midline sebaceous cyst  -on exam today, there is an area of fluctuance with overlying erythema, central punctate area noted with no purulence    Plan:  -most likely consistent with infected sebaceous cyst   He has an appointment scheduled with Dermatology for this upcoming Monday for incision and drainage, which I explained would be the definitive treatment for this  -in the meantime, I will prescribe Bactrim for 7 days  Advised that he continue with warm compresses to the area which may shrink the cyst prior to I&D  -if he becomes systemically ill in the interim he should present to the ER for further evaluation      - sulfamethoxazole-trimethoprim (BACTRIM DS) 800-160 mg per tablet; Take 1 tablet by mouth every 12 (twelve) hours for 7 days  Dispense: 14 tablet; Refill: 0          No orders of the defined types were placed in this encounter  Chief Complaint     Chief Complaint   Patient presents with    Cyst     Mid back       History of Present Illness     Kae Brittle comes in today for same-day appointment    He has a medical history of hypertension, BPH, prediabetes    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    The following portions of the patient's history were reviewed and updated as appropriate: allergies, current medications, past family history, past medical history, past social history, past surgical history and problem list     Review of Systems     10 point ROS negative except per HPI    Active Problem List     Patient Active Problem List   Diagnosis    Primary osteoarthritis of right knee    Benign prostatic hyperplasia    Glaucoma    Hypertension    Hyperlipidemia    Prediabetes    Nocturia    Rash    Cough    COVID-19 virus infection    Class 2 severe obesity due to excess calories with serious comorbidity and body mass index (BMI) of 35 0 to 35 9 in adult [facility])    Right inguinal pain       Objective     /82 (BP Location: Left arm, Patient Position: Sitting, Cuff Size: Adult)   Pulse 72   Temp (!) 97 3 °F (36 3 °C)   Ht 5' 9" (1 753 m)   Wt 109 kg (241 lb)   SpO2 98%   BMI 35 59 kg/m²     Physical Exam  Constitutional:       Appearance: Normal appearance  He is not ill-appearing  HENT:      Head: Normocephalic and atraumatic  Eyes:      General: No scleral icterus  Right eye: No discharge  Left eye: No discharge  Cardiovascular:      Rate and Rhythm: Normal rate and regular rhythm  Heart sounds: No murmur heard  No friction rub  Pulmonary:      Effort: Pulmonary effort is normal       Breath sounds: Normal breath sounds  No wheezing or rales  Abdominal:      General: Abdomen is flat  There is no distension  Palpations: Abdomen is soft  Tenderness: There is no abdominal tenderness  Musculoskeletal:         General: No swelling or tenderness  Skin:     General: Skin is warm and dry  Findings: Lesion (3-4 cm area of fluctuance with erythema in the midline thoracolumbar area  No purulence expressed) present  No erythema  Neurological:      Mental Status: He is alert  Mental status is at baseline  Motor: No weakness     Psychiatric:         Mood and Affect: Mood normal          Behavior: Behavior normal  Pertinent Laboratory/Diagnostic Studies:  CT abdomen pelvis w contrast    Result Date: 10/20/2021  Impression: 1  No etiology for right lower quadrant pain identified  2   Stranding of the small bowel mesentery with borderline lymph nodes  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 though early lymphoma can present similarly  Clinical correlation and a follow-up CT in 6 months is recommended  3   Prostatomegaly with small posterior bladder diverticulum  4   Cholelithiasis with large gallstone measuring 2 6 cm  The study was marked in EPIC for significant notification   Workstation performed: [id number]       Images and diagnostics reviewed     Current Medications     Current Outpatient Medications:     amLODIPine (NORVASC) 5 mg tablet, Take 1 tablet (5 mg total) by mouth daily, Disp: 90 tablet, Rfl: 3    Ascorbic Acid (vitamin C) 1000 MG tablet, Take 1,000 mg by mouth daily, Disp: , Rfl:     cholecalciferol (VITAMIN D3) 1,000 units tablet, Take 1,000 Units by mouth daily, Disp: , Rfl:     hydrochlorothiazide (HYDRODIURIL) 25 mg tablet, Take 1 tablet (25 mg total) by mouth daily, Disp: 90 tablet, Rfl: 3    losartan (COZAAR) 100 MG tablet, Take 1 tablet (100 mg total) by mouth daily, Disp: 90 tablet, Rfl: 3    metoprolol succinate (TOPROL-XL) 50 mg 24 hr tablet, Take 1 tablet (50 mg total) by mouth daily, Disp: 90 tablet, Rfl: 3    Multiple Vitamin (MULTI-VITAMIN DAILY PO), Take 1 tablet by mouth daily, Disp: , Rfl:     ROCKLATAN 0 02-0 005 % SOLN, , Disp: , Rfl: 4    spironolactone (ALDACTONE) 25 mg tablet, Take 1 tablet (25 mg total) by mouth daily, Disp: 90 tablet, Rfl: 3    tamsulosin (FLOMAX) 0 4 mg, Take 1 capsule (0 4 mg total) by mouth daily at bedtime, Disp: 90 capsule, Rfl: 3    zinc gluconate 50 mg tablet, Take 50 mg by mouth daily, Disp: , Rfl:     sulfamethoxazole-trimethoprim (BACTRIM DS) 800-160 mg per tablet, Take 1 tablet by mouth every 12 (twelve) hours for 7 days, Disp: 14 tablet, Rfl: 0    Health Maintenance     Health Maintenance   Topic Date Due    Colorectal Cancer Screening  10/10/2021    Fall Risk  03/11/2022    Depression Screening  03/11/2022    BMI: Followup Plan  03/18/2022    Influenza Vaccine (1) 06/30/2022 (Originally 9/1/2021)    Annual Physical  06/15/2022    BMI: Adult  01/07/2023    DTaP,Tdap,and Td Vaccines (3 - Td or Tdap) 06/05/2031    Hepatitis C Screening  Completed    Pneumococcal Vaccine: 65+ Years  Completed    COVID-19 Vaccine  Completed    HIB Vaccine  Aged Out    Hepatitis B Vaccine  Aged Out    IPV Vaccine  Aged Out    Hepatitis A Vaccine  Aged Out    Meningococcal ACWY Vaccine  Aged Out    HPV Vaccine  Aged Out     Immunization History   Administered Date(s) Administered    COVID-19 MODERNA VACC 0 5 ML IM 02/18/2021, 03/18/2021, 11/19/2021    Pneumococcal Conjugate 13-Valent 11/20/2018    Pneumococcal Polysaccharide PPV23 06/15/2021    Tdap 01/01/2014, 06/05/2021       NATALIE [facility] - [place] [address], [facility] [address]  [place], [address] [facility]  Office: [phone]

## 2022-01-10 ENCOUNTER — TELEPHONE (OUTPATIENT)
Dept: GASTROENTEROLOGY | Facility: MEDICAL CENTER | Age: 72
End: 2022-01-10

## 2022-01-10 NOTE — TELEPHONE ENCOUNTER
Patient called and states he has an open wound that has been covered on his back and would like to know if he still could have his procedure done on 01/12/2022 plus if he should take his antibiotic the morning of the procedure  He can be reached at his cell @ [phone]

## 2022-01-11 ENCOUNTER — TELEPHONE (OUTPATIENT)
Dept: GASTROENTEROLOGY | Facility: MEDICAL CENTER | Age: 72
End: 2022-01-11

## 2022-01-11 NOTE — TELEPHONE ENCOUNTER
Patient of Dr Gisele Gunn, last seen 11/8/21    Patient being seen for 10 yr screening colonoscopy on 1/12  He is asking if it is okay to proceed since he has wound on his back  Per PCP it is suspected to be an infected sebaceous cyst  He saw dermatologist yesterday and had I&D done  He is on bactrim  States that his dermatologist told him it was likely okay to proceed but to check with us  I advised patient I would make Dr Gisele Gunn aware but it should not be contraindicated  Discussed with Dr Gisele Gunn, okay to proceed   Made patient aware

## 2022-01-12 ENCOUNTER — ANESTHESIA (OUTPATIENT)
Dept: GASTROENTEROLOGY | Facility: MEDICAL CENTER | Age: 72
End: 2022-01-12

## 2022-01-12 ENCOUNTER — ANESTHESIA EVENT (OUTPATIENT)
Dept: GASTROENTEROLOGY | Facility: MEDICAL CENTER | Age: 72
End: 2022-01-12

## 2022-01-12 ENCOUNTER — HOSPITAL ENCOUNTER (OUTPATIENT)
Dept: GASTROENTEROLOGY | Facility: MEDICAL CENTER | Age: 72
Setting detail: OUTPATIENT SURGERY
Discharge: HOME | End: 2022-01-12
Admitting: INTERNAL MEDICINE
Payer: COMMERCIAL

## 2022-01-12 VITALS
DIASTOLIC BLOOD PRESSURE: 67 MMHG | HEART RATE: 58 BPM | SYSTOLIC BLOOD PRESSURE: 118 MMHG | WEIGHT: 234 LBS | OXYGEN SATURATION: 97 % | TEMPERATURE: 97.3 F | HEIGHT: 69 IN | RESPIRATION RATE: 16 BRPM | BODY MASS INDEX: 34.66 KG/M2

## 2022-01-12 DIAGNOSIS — Z12.11 SCREENING FOR COLORECTAL CANCER: ICD-10-CM

## 2022-01-12 DIAGNOSIS — Z12.12 SCREENING FOR COLORECTAL CANCER: ICD-10-CM

## 2022-01-12 PROBLEM — E66.9 OBESITY (BMI 30-39.9): Status: ACTIVE | Noted: 2022-01-12

## 2022-01-12 PROCEDURE — 45385 COLONOSCOPY W/LESION REMOVAL: CPT | Performed by: INTERNAL MEDICINE

## 2022-01-12 PROCEDURE — 88305 TISSUE EXAM BY PATHOLOGIST: CPT | Performed by: PATHOLOGY

## 2022-01-12 RX ORDER — SODIUM CHLORIDE 9 MG/ML
125 INJECTION, SOLUTION INTRAVENOUS CONTINUOUS
Status: DISCONTINUED | OUTPATIENT
Start: 2022-01-12 | End: 2022-01-16 | Stop reason: HOSPADM

## 2022-01-12 RX ORDER — PROPOFOL 10 MG/ML
INJECTION, EMULSION INTRAVENOUS AS NEEDED
Status: DISCONTINUED | OUTPATIENT
Start: 2022-01-12 | End: 2022-01-12

## 2022-01-12 RX ADMIN — PROPOFOL 50 MG: 10 INJECTION, EMULSION INTRAVENOUS at 09:30

## 2022-01-12 RX ADMIN — SODIUM CHLORIDE 125 ML/HR: 0.9 INJECTION, SOLUTION INTRAVENOUS at 09:14

## 2022-01-12 RX ADMIN — PROPOFOL 100 MG: 10 INJECTION, EMULSION INTRAVENOUS at 09:29

## 2022-01-12 RX ADMIN — PROPOFOL 50 MG: 10 INJECTION, EMULSION INTRAVENOUS at 09:35

## 2022-01-12 RX ADMIN — PROPOFOL 50 MG: 10 INJECTION, EMULSION INTRAVENOUS at 09:32

## 2022-01-12 NOTE — ANESTHESIA PREPROCEDURE EVALUATION
Procedure:  COLONOSCOPY    Relevant Problems   ANESTHESIA (within normal limits)      CARDIO   (+) Hyperlipidemia   (+) Hypertension      /RENAL   (+) Benign prostatic hyperplasia      MUSCULOSKELETAL   (+) Primary osteoarthritis of right knee      Other   (+) Obesity (BMI 30-39 9)   (+) Ocular hypertension of left eye        Physical Exam    Airway    Mallampati score: II  TM Distance: >3 FB  Neck ROM: full     Dental       Cardiovascular  Rhythm: regular, Rate: normal,     Pulmonary  Breath sounds clear to auscultation,     Other Findings        Anesthesia Plan  ASA Score- 2     Anesthesia Type-     Plan Factors-Exercise tolerance (METS): >4 METS  Chart reviewed  Patient summary reviewed  Patient is not a current smoker  Induction- intravenous  Postoperative Plan-     Informed Consent- Anesthetic plan and risks discussed with patient

## 2022-01-12 NOTE — H&P
H&P EXAM - Outpatient Endoscopy   Altagracia Suazo 80 y o  male MRN: [id number]    [facility]   Encounter: [id number]        History and Physical - [facility]  Altagracia Suazo 80 y o  male MRN: [id number]                  HPI: Altagracia Suazo is a 80y o  year old male who presents for colon cancer screening      REVIEW OF SYSTEMS: Per the HPI, and otherwise unremarkable      Historical Information   Past Medical History:   Diagnosis Date    Exposure to hepatitis     treated with medication    Hearing loss of aging     History of blood transfusion     as a child    History of total left knee replacement     HL (hearing loss)     Hx of exposure to tuberculosis     treated in past with meds    Hyperlipidemia     Hypertension     OA (osteoarthritis)     bea  knees    Ocular hypertension of left eye     Tinnitus     Use of cane as ambulatory aid     Wears glasses      Past Surgical History:   Procedure Laterality Date    ADENOIDECTOMY      CATARACT EXTRACTION Bilateral     COLONOSCOPY      JOINT REPLACEMENT Left     knee    FL TOTAL KNEE ARTHROPLASTY Left 2017    Procedure: ARTHROPLASTY KNEE TOTAL;  Surgeon: Ramses Desir MD;  Location: [facility];  Service: Orthopedics    FL TOTAL KNEE ARTHROPLASTY Right 2017    Procedure: ARTHROPLASTY KNEE TOTAL;  Surgeon: Ramses Desir MD;  Location: [facility];  Service: Orthopedics    TONSILLECTOMY      WISDOM TOOTH EXTRACTION       Social History   Social History     Substance and Sexual Activity   Alcohol Use No     Social History     Substance and Sexual Activity   Drug Use No     Social History     Tobacco Use   Smoking Status Former Smoker    Quit date: 1972    Years since quittin 5   Smokeless Tobacco Never Used   Tobacco Comment    per [employer]:  Never a smoker     Family History   Problem Relation Age of Onset    Diabetes Mother     COPD Father     Cancer Father         Gastric    Cancer Paternal Uncle         Gastric    Breast cancer Family     Thyroid cancer Family        Meds/Allergies     (Not in a hospital admission)      Allergies   Allergen Reactions    Celebrex [Celecoxib] GI Intolerance     Nausea - pt able to tolerate with Prevacid    Influenza Vaccines        "I get the flu or feel horrible for months"       Objective     /70   Pulse 63   Temp (!) 97 3 °F (36 3 °C) (Temporal)   Resp 16   Ht 5' 9" (1 753 m)   Wt 106 kg (234 lb)   SpO2 99%   BMI 34 56 kg/m²       PHYSICAL EXAM    Gen: NAD  CV: RRR  CHEST: Clear  ABD: soft, NT/ND  EXT: no edema      ASSESSMENT/PLAN:  This is a 80y o  year old male here for colonoscopy, and he is stable and optimized for his procedure

## 2022-01-12 NOTE — DISCHARGE INSTRUCTIONS
Colonoscopy   WHAT YOU NEED TO KNOW:   A colonoscopy is a procedure to examine the inside of your colon (intestine) with a scope  Polyps or tissue growths may have been removed during your colonoscopy  It is normal to feel bloated and to have some abdominal discomfort  You should be passing gas  If you have hemorrhoids or you had polyps removed, you may have a small amount of bleeding  DISCHARGE INSTRUCTIONS:   Seek care immediately if:    You have sudden, severe abdominal pain   You have problems swallowing   You have a large amount of black, sticky bowel movements or blood in your bowel movements   You have sudden trouble breathing   You feel weak, lightheaded, or faint or your heart beats faster than normal for you  Contact your healthcare provider if:    You have a fever and chills   You have nausea or are vomiting   Your abdomen is bloated or feels full and hard   You have abdominal pain   You have black, sticky bowel movements or blood in your bowel movements   You have not had a bowel movement for 3 days after your procedure   You have rash or hives   You have questions or concerns about your procedure  Activity:    Do not lift, strain, or run for 24 hours after your procedure   Rest after your procedure  You have been given medicine to relax you  Do not drive or make important decisions until the day after your procedure  Return to your normal activity as directed   Relieve gas and discomfort from bloating by lying on your right side with a heating pad on your abdomen  You may need to take short walks to help the gas move out  Eat small meals until bloating is relieved  Follow up with your healthcare provider as directed: Write down your questions so you remember to ask them during your visits  If you take a blood thinner, please review the specific instructions from your endoscopist about when you should resume it   These can be found in the Recommendation and Your Medication list sections of this After Visit Summary  Colorectal Polyps   WHAT YOU NEED TO KNOW:   What are colorectal polyps? Colorectal polyps are small growths of tissue in the lining of the colon and rectum  Most polyps are usually benign (not cancer)  Certain types of polyps, called adenomatous polyps, may turn into cancer  What increases my risk for colorectal polyps? The exact cause of colorectal polyps is unknown  The following may increase your risk:  · Older age    · Foods high in fat and low in fiber    · Family history of polyps    · Intestinal diseases, such as Crohn disease or ulcerative colitis    · Smoking cigarettes or drinking alcohol    · Lack of physical activity, such as exercise    · Obesity    What are the signs and symptoms of colorectal polyps? · Blood in your bowel movement or bleeding from the rectum    · Change in bowel movement habits, such as diarrhea or constipation    · Abdominal pain    What do I need to know about colorectal polyp screening and diagnosis? Screening means you are checked for polyps that may be cancer, even if you do not have signs or symptoms  Screening is recommended starting at age 48 and continuing to age 76 if you are at average risk  Your healthcare provider may suggest screening starting at age 39  Screening may start before you are 45 or continue after you are 75 if your risk is high  Your provider will tell you how often to get screened  Timing depends on the type of screening and if polyps are found  Timing also depends on your age and if you are at increased risk for cancer  Screening may be recommended every 1, 2, 5, or 10 years  Your healthcare provider will need to test polyps to find out if they are cancer  Any of the following may be used to find polyps:  · A digital rectal exam  means your provider will use a finger to check for polyps  · A barium enema  is an x-ray of the colon   A tube is put into your anus, and a liquid called barium is put through the tube  Barium is used so that healthcare providers can see your colon better on the x-ray film  · A virtual colonoscopy  is a CT scan that takes pictures of the inside of your colon and rectum  A small, flexible tube is put into your rectum and air or carbon dioxide (gas) is used to expand your colon  This lets healthcare providers clearly see your colon and any polyps on a monitor  · Colonoscopy or sigmoidoscopy  are procedures to help your provider see the inside of your colon  He or she will use a flexible tube with a small light and camera on the end  During a sigmoidoscopy, your provider will only look at rectum and lower colon  During a colonoscopy, he or she will look at the full length of your colon  A small amount of tissue may be removed from your colon for tests  How are colorectal polyps treated? Small, benign polyps may not need treatment  Your healthcare provider will check the polyp over time to make sure it is not changing  Polyps that are cancer may be removed with one of the following:  · A polypectomy  is a minimally invasive procedure to remove a polyp during a colonoscopy or sigmoidoscopy  Your healthcare provider may need to remove the polyp with a laparoscope  Laparoscopy is done by inserting a small, flexible scope into incisions made on your abdomen  · Surgery  may be needed to remove large or deep polyps that cannot be removed in a polypectomy  Tissues or lymph nodes near a polyp may also be removed  This helps stop cancer from spreading  What can I do to lower my risk for colorectal polyps? · Eat a variety of healthy foods  Healthy foods include fruit, vegetables, whole-grain breads, low-fat dairy products, beans, lean meat, and fish  Ask if you need to be on a special diet  · Maintain a healthy weight  Ask your healthcare provider what a healthy weight is for you   Ask him or her to help with a weight loss plan if you are overweight  · Exercise as directed  Begin to exercise slowly and do more as you get stronger  Talk with your healthcare provider before you start an exercise program          · Limit alcohol  Your risk for polyps increases the more you drink  · Do not smoke  Nicotine and other chemicals in cigarettes and cigars increase your risk for polyps  Ask your healthcare provider for information if you currently smoke and need help to quit  E-cigarettes or smokeless tobacco still contain nicotine  Talk to your healthcare provider before you use these products  Where can I find more information? · [facility] ([facility])  [address], [place] [address]  Phone: [phone]  Web Address: Tania Mendez  [facility] gov    Call your local emergency number (911 in the [address] [facility]) if:   · You have sudden shortness of breath  · You have a fast heart rate, fast breathing, or are too dizzy to stand up  When should I seek immediate care? · You have severe abdominal pain  · You see blood in your bowel movement  When should I call my doctor? · You have a fever  · You have chills, a cough, or feel weak and achy  · You have abdominal pain that does not go away or gets worse after you take medicine  · Your abdomen is swollen  · You are losing weight without trying  · You have questions or concerns about your condition or care  CARE AGREEMENT:   You have the right to help plan your care  Learn about your health condition and how it may be treated  Discuss treatment options with your healthcare providers to decide what care you want to receive  You always have the right to refuse treatment  The above information is an  only  It is not intended as medical advice for individual conditions or treatments   Talk to your doctor, nurse or pharmacist before following any medical regimen to see if it is safe and effective for you  © Copyright [employer] 2021 Information is for End User's use only and may not be sold, redistributed or otherwise used for commercial purposes   All illustrations and images included in CareNotes® are the copyrighted property of [employer]  or Charisse Mckeon

## 2022-02-01 PROCEDURE — 88305 TISSUE EXAM BY PATHOLOGIST: CPT | Performed by: PATHOLOGY

## 2022-02-02 ENCOUNTER — LAB REQUISITION (OUTPATIENT)
Dept: LAB | Facility: HOSPITAL | Age: 72
End: 2022-02-02
Payer: COMMERCIAL

## 2022-02-02 DIAGNOSIS — D48.5 NEOPLASM OF UNCERTAIN BEHAVIOR OF SKIN: ICD-10-CM

## 2022-03-02 PROCEDURE — 88305 TISSUE EXAM BY PATHOLOGIST: CPT | Performed by: STUDENT IN AN ORGANIZED HEALTH CARE EDUCATION/TRAINING PROGRAM

## 2022-03-03 ENCOUNTER — LAB REQUISITION (OUTPATIENT)
Dept: LAB | Facility: HOSPITAL | Age: 72
End: 2022-03-03
Payer: COMMERCIAL

## 2022-03-03 DIAGNOSIS — C44.519 BASAL CELL CARCINOMA OF SKIN OF OTHER PART OF TRUNK: ICD-10-CM

## 2022-04-14 ENCOUNTER — OFFICE VISIT (OUTPATIENT)
Dept: URGENT CARE | Facility: MEDICAL CENTER | Age: 72
End: 2022-04-14
Payer: COMMERCIAL

## 2022-04-14 VITALS
WEIGHT: 244 LBS | HEIGHT: 69 IN | TEMPERATURE: 97.5 F | BODY MASS INDEX: 36.14 KG/M2 | OXYGEN SATURATION: 97 % | RESPIRATION RATE: 19 BRPM | HEART RATE: 65 BPM

## 2022-04-14 DIAGNOSIS — R53.83 FATIGUE, UNSPECIFIED TYPE: Primary | ICD-10-CM

## 2022-04-14 PROCEDURE — G0383 LEV 4 HOSP TYPE B ED VISIT: HCPCS | Performed by: PHYSICIAN ASSISTANT

## 2022-04-14 PROCEDURE — S9083 URGENT CARE CENTER GLOBAL: HCPCS | Performed by: PHYSICIAN ASSISTANT

## 2022-04-14 PROCEDURE — 87636 SARSCOV2 & INF A&B AMP PRB: CPT | Performed by: PHYSICIAN ASSISTANT

## 2022-04-14 RX ORDER — OSELTAMIVIR PHOSPHATE 75 MG/1
75 CAPSULE ORAL 2 TIMES DAILY
Qty: 10 CAPSULE | Refills: 0 | Status: SHIPPED | OUTPATIENT
Start: 2022-04-14 | End: 2022-04-19

## 2022-04-14 NOTE — PATIENT INSTRUCTIONS
Patient was educated on COVID and the flu  Patient was educated to stay quarantine until results are back  Patient was educated on hydration  Patient was told I prescribed Tamiflul but should not start unless he test positive for flu  COVID-19 (Coronavirus Disease 2019)   WHAT YOU NEED TO KNOW:   COVID-19 is the disease caused by a coronavirus first discovered in December 2019  Coronaviruses generally cause upper respiratory (nose, throat, and lung) infections, such as a cold  The 2019 virus spreads quickly and easily  It can be spread starting 2 to 3 days before symptoms even begin  DISCHARGE INSTRUCTIONS:   Call your local emergency number (911 in the [address] [facility]) if:   · You have trouble breathing or shortness of breath at rest     · You have chest pain or pressure that lasts longer than 5 minutes  · You become confused or hard to wake  · Your lips or face are blue  Return to the emergency department if:   · You have a fever of 104°F (40°C) or higher  Call your doctor if:   · You have symptoms of COVID-19  · You have questions or concerns about your condition or care  Medicines: You may need any of the following:  · Decongestants  help reduce nasal congestion and help you breathe more easily  If you take decongestant pills, they may make you feel restless or cause problems with your sleep  Do not use decongestant sprays for more than a few days  · Cough suppressants  help reduce coughing  Ask your healthcare provider which type of cough medicine is best for you  · Acetaminophen  decreases pain and fever  It is available without a doctor's order  Ask how much to take and how often to take it  Follow directions  Read the labels of all other medicines you are using to see if they also contain acetaminophen, or ask your doctor or pharmacist  Acetaminophen can cause liver damage if not taken correctly  Do not use more than 4 grams (4,000 milligrams) total of acetaminophen in one day  · NSAIDs , such as ibuprofen, help decrease swelling, pain, and fever  This medicine is available with or without a doctor's order  NSAIDs can cause stomach bleeding or kidney problems in certain people  If you take blood thinner medicine, always ask your healthcare provider if NSAIDs are safe for you  Always read the medicine label and follow directions  · Blood thinners  help prevent blood clots  Clots can cause strokes, heart attacks, and death  The following are general safety guidelines to follow while you are taking a blood thinner:    ? Watch for bleeding and bruising while you take blood thinners  Watch for bleeding from your gums or nose  Watch for blood in your urine and bowel movements  Use a soft washcloth on your skin, and a soft toothbrush to brush your teeth  This can keep your skin and gums from bleeding  If you shave, use an electric shaver  Do not play contact sports  ? Tell your dentist and other healthcare providers that you take a blood thinner  Wear a bracelet or necklace that says you take this medicine  ? Do not start or stop any other medicines unless your healthcare provider tells you to  Many medicines cannot be used with blood thinners  ? Take your blood thinner exactly as prescribed by your healthcare provider  Do not skip does or take less than prescribed  Tell your provider right away if you forget to take your blood thinner, or if you take too much  ? Warfarin  is a blood thinner that you may need to take  The following are things you should be aware of if you take warfarin:     § Foods and medicines can affect the amount of warfarin in your blood  Do not make major changes to your diet while you take warfarin  Warfarin works best when you eat about the same amount of vitamin K every day  Vitamin K is found in green leafy vegetables and certain other foods  Ask for more information about what to eat when you are taking warfarin      § You will need to see your healthcare provider for follow-up visits when you are on warfarin  You will need regular blood tests  These tests are used to decide how much medicine you need  · Take your medicine as directed  Contact your healthcare provider if you think your medicine is not helping or if you have side effects  Tell him or her if you are allergic to any medicine  Keep a list of the medicines, vitamins, and herbs you take  Include the amounts, and when and why you take them  Bring the list or the pill bottles to follow-up visits  Carry your medicine list with you in case of an emergency  What you need to know about variants: The virus has changed into several new forms (called variants) since it was discovered  The variants may be more contagious (easily spread) than the original form  Some may also cause more severe illness than others  What you need to know about COVID-19 vaccines:  Healthcare providers recommend a COVID-19 vaccine, even if you have already had COVID-19  You are considered fully vaccinated against COVID-19 two weeks after the final dose of any COVID-19 vaccine  Let your healthcare provider know when you have received the final dose of the vaccine  Make a copy of your vaccination card  Keep the original with you in case you need to show it  Keep the copy in a safe place  · COVID-19 vaccines are given as a shot in 1 or 2 doses  Vaccination is recommended for everyone 5 years or older  One 2-dose vaccine is fully approved for those 12 or older  This vaccine also has an emergency use authorization (EUA) for children 11to 13years old  No vaccine is currently available for children younger than 5 years  A booster (additional) dose is given to help the immune system continue to protect against severe COVID-19     ? A booster is recommended for all adults 18 or older  The booster can be a different brand of the COVID-19 vaccine than you originally received   The timing for the booster depends on the type of vaccine you received:    § 1-dose vaccine: The booster is given at least 2 months after you received the vaccine  § 2-dose vaccine: The booster is given at least 6 months after the second dose   ? A booster can be given to adolescents 12to 16years old  Only 1 COVID-19 vaccine has an EUA for adolescent boosters  The booster is given at least 6 months after the second dose of the original vaccine series  Continue social distancing and other measures, even after you get the vaccine  Although it is not common, you can become infected after you get the vaccine  You may also be able to pass the virus to others without knowing you are infected  After you get the vaccine, check local, national, and international travel rules  You may need to be tested before you travel  Some countries require proof of a negative test before you travel  You may also need to quarantine after you return  How the 2019 coronavirus spreads:   · Droplets are the main way all coronaviruses spread  The virus travels in droplets that form when a person talks, sings, coughs, or sneezes  The droplets can also float in the air for minutes or hours  Infection happens when you breathe in the droplets or get them in your eyes or nose  Close personal contact with an infected person increases your risk for infection  This means being within 6 feet (2 meters) of the person for at least 15 minutes over 24 hours  · Person-to-person contact can spread the virus  For example, a person with the virus on his or her hands can spread it by shaking hands with someone  · The virus can stay on objects and surfaces for up to 3 days  You may become infected by touching the object or surface and then touching your eyes or mouth  Help lower the risk for COVID-19:   · Wash your hands often throughout the day  Use soap and water  Rub your soapy hands together, lacing your fingers, for at least 20 seconds   Rinse with warm, running water  Dry your hands with a clean towel or paper towel  Use hand  that contains alcohol if soap and water are not available  Teach children how to wash their hands and use hand   · Cover sneezes and coughs  Turn your face away and cover your mouth and nose with a tissue  Throw the tissue away  Use the bend of your arm if a tissue is not available  Then wash your hands well with soap and water or use hand   Teach children how to cover a cough or sneeze  · Wear a face covering (mask) when needed  Use a cloth covering with at least 2 layers  You can also create layers by putting a cloth covering over a disposable non-medical mask  Cover your mouth and your nose  · Follow worldwide, national, and local social distancing guidelines  Keep at least 6 feet (2 meters) between you and others  · Try not to touch your face  If you get the virus on your hands, you can transfer it to your eyes, nose, or mouth and become infected  You can also transfer it to objects, surfaces, or people  · Clean and disinfect high-touch surfaces and objects often  Use disinfecting wipes, or make a solution of 4 teaspoons of bleach in 1 quart (4 cups) of water  · Ask about other vaccines you may need  Get the influenza (flu) vaccine as soon as recommended each year, usually starting in September or October  Get the pneumonia vaccine if recommended  Your healthcare provider can tell you if you should also get other vaccines, and when to get them  Follow social distancing guidelines:  National and local social distancing rules vary  Rules and restrictions may change over time as restrictions are lifted  The following are general guidelines:  · Stay home if you are sick or think you may have COVID-19  It is important to stay home if you are waiting for a testing appointment or for test results  · Avoid close physical contact with anyone who does not live in your home    Do not shake hands with, hug, or kiss a person as a greeting  If you must use public transportation (such as a bus or subway), try to sit or stand away from others  Wear your face covering  · Avoid in-person gatherings and crowds  Attend virtually if possible  Follow up with your doctor as directed:  Write down your questions so you remember to ask them during your visits  For more information:   · Centers for Disease Control and Prevention  [address] , [address]  Phone: 3- 514 - [phone]  Web Address: [employer]    © Copyright [employer] 2022 Information is for End User's use only and may not be sold, redistributed or otherwise used for commercial purposes  All illustrations and images included in CareNotes® are the copyrighted property of [employer]  or Charisse Carmona  The above information is an  only  It is not intended as medical advice for individual conditions or treatments  Talk to your doctor, nurse or pharmacist before following any medical regimen to see if it is safe and effective for you

## 2022-04-14 NOTE — PROGRESS NOTES
[employer] [facility] [employer] [facility]        NAME: Paola Griffiths is a 80 y o  male  : 1950    MRN: [id number]  DATE: 2022  TIME: 7:31 PM    Assessment and Plan   Fatigue, unspecified type [R53 83]  1  Fatigue, unspecified type  Cov/Flu-Collected at [facility]    oseltamivir (TAMIFLU) 75 mg capsule     COVID and flu testing completed  Patient Instructions       Patient was educated on COVID and the flu  Patient was educated to stay quarantine until results are back  Patient was educated on hydration  Patient was told I prescribed Tamiflul but should not start unless he test positive for flu  Chief Complaint     Chief Complaint   Patient presents with    Cold Like Symptoms     congestion, sore throat, runny nose, and sore throat x today  denies any sick contacts  is covid vaccinated, not flu vaccinated  History of Present Illness       Patient is here today complaining fatigue, sore throat, runny nose and congestion for 1 day  Patient is vaccinated for COVID 19 with Elois Jump  Patient works as a physical therapist        Review of Systems   Review of Systems   Constitutional: Negative  HENT: Positive for sore throat  Respiratory: Positive for cough and chest tightness  Musculoskeletal: Positive for myalgias           Current Medications       Current Outpatient Medications:     amLODIPine (NORVASC) 5 mg tablet, Take 1 tablet (5 mg total) by mouth daily, Disp: 90 tablet, Rfl: 3    Ascorbic Acid (vitamin C) 1000 MG tablet, Take 1,000 mg by mouth daily, Disp: , Rfl:     cholecalciferol (VITAMIN D3) 1,000 units tablet, Take 1,000 Units by mouth daily, Disp: , Rfl:     hydrochlorothiazide (HYDRODIURIL) 25 mg tablet, Take 1 tablet (25 mg total) by mouth daily, Disp: 90 tablet, Rfl: 3    losartan (COZAAR) 100 MG tablet, Take 1 tablet (100 mg total) by mouth daily, Disp: 90 tablet, Rfl: 3    metoprolol succinate (TOPROL-XL) 50 mg 24 hr tablet, Take 1 tablet (50 mg total) by mouth daily, Disp: 90 tablet, Rfl: 3    Multiple Vitamin (MULTI-VITAMIN DAILY PO), Take 1 tablet by mouth daily, Disp: , Rfl:     ROCKLATAN 0 02-0 005 % SOLN, , Disp: , Rfl: 4    spironolactone (ALDACTONE) 25 mg tablet, Take 1 tablet (25 mg total) by mouth daily, Disp: 90 tablet, Rfl: 3    tamsulosin (FLOMAX) 0 4 mg, Take 1 capsule (0 4 mg total) by mouth daily at bedtime, Disp: 90 capsule, Rfl: 3    oseltamivir (TAMIFLU) 75 mg capsule, Take 1 capsule (75 mg total) by mouth 2 (two) times a day for 5 days, Disp: 10 capsule, Rfl: 0    zinc gluconate 50 mg tablet, Take 50 mg by mouth daily (Patient not taking: Reported on 4/14/2022 ), Disp: , Rfl:     Current Allergies     Allergies as of 04/14/2022 - Reviewed 04/14/2022   Allergen Reaction Noted    Celebrex [celecoxib] GI Intolerance 07/14/2017    Influenza vaccines  07/14/2017            The following portions of the patient's history were reviewed and updated as appropriate: allergies, current medications, past family history, past medical history, past social history, past surgical history and problem list      Past Medical History:   Diagnosis Date    Exposure to hepatitis     treated with medication    Hearing loss of aging     History of blood transfusion     as a child    History of total left knee replacement     HL (hearing loss)     Hx of exposure to tuberculosis     treated in past with meds    Hyperlipidemia     Hypertension     OA (osteoarthritis)     bea  knees    Ocular hypertension of left eye     Tinnitus     Use of cane as ambulatory aid     Wears glasses        Past Surgical History:   Procedure Laterality Date    ADENOIDECTOMY      CATARACT EXTRACTION Bilateral     COLONOSCOPY      JOINT REPLACEMENT Left     knee    MN TOTAL KNEE ARTHROPLASTY Left 7/14/2017    Procedure: ARTHROPLASTY KNEE TOTAL;  Surgeon: Becka Toro MD;  Location[facility];  Service: Orthopedics    MN TOTAL KNEE ARTHROPLASTY Right 9/5/2017 Procedure: ARTHROPLASTY KNEE TOTAL;  Surgeon: Woodrow Garcia MD;  [facility];  Service: Orthopedics    TONSILLECTOMY      WISDOM TOOTH EXTRACTION         Family History   Problem Relation Age of Onset    Diabetes Mother     COPD Father     Cancer Father         Gastric    Cancer Paternal Uncle         Gastric    Breast cancer Family     Thyroid cancer Family          Medications have been verified  Objective   Pulse 65   Temp 97 5 °F (36 4 °C)   Resp 19   Ht 5' 9" (1 753 m)   Wt 111 kg (244 lb)   SpO2 97%   BMI 36 03 kg/m²   No LMP for male patient  Physical Exam     Physical Exam  Constitutional:       Appearance: He is normal weight  HENT:      Head: Normocephalic  Comments: No pain over frontal or maxillary sinus     Right Ear: Tympanic membrane, ear canal and external ear normal       Left Ear: Tympanic membrane, ear canal and external ear normal       Nose: Nose normal       Mouth/Throat:      Mouth: Mucous membranes are moist       Pharynx: Posterior oropharyngeal erythema present  No oropharyngeal exudate  Eyes:      Pupils: Pupils are equal, round, and reactive to light  Neck:      Comments: NO palpable lymph nodes  Cardiovascular:      Rate and Rhythm: Normal rate and regular rhythm  Heart sounds: Normal heart sounds  Pulmonary:      Breath sounds: Normal breath sounds  Neurological:      General: No focal deficit present  Mental Status: He is alert and oriented to person, place, and time     Psychiatric:         Mood and Affect: Mood normal          Behavior: Behavior normal

## 2022-04-15 LAB
FLUAV RNA RESP QL NAA+PROBE: NEGATIVE
FLUBV RNA RESP QL NAA+PROBE: NEGATIVE
SARS-COV-2 RNA RESP QL NAA+PROBE: NEGATIVE

## 2022-04-16 ENCOUNTER — TELEPHONE (OUTPATIENT)
Dept: URGENT CARE | Facility: MEDICAL CENTER | Age: 72
End: 2022-04-16

## 2022-06-27 ENCOUNTER — TELEMEDICINE (OUTPATIENT)
Dept: INTERNAL MEDICINE CLINIC | Facility: CLINIC | Age: 72
End: 2022-06-27
Payer: COMMERCIAL

## 2022-06-27 DIAGNOSIS — Z12.5 SCREENING FOR PROSTATE CANCER: Primary | ICD-10-CM

## 2022-06-27 DIAGNOSIS — R73.03 PREDIABETES: ICD-10-CM

## 2022-06-27 DIAGNOSIS — I10 PRIMARY HYPERTENSION: ICD-10-CM

## 2022-06-27 PROCEDURE — 99214 OFFICE O/P EST MOD 30 MIN: CPT | Performed by: INTERNAL MEDICINE

## 2022-06-27 NOTE — PROGRESS NOTES
Virtual Regular Visit    Verification of patient location:    Patient is located in the following state in which I hold an active license [place]      Assessment/Plan:    Cause of symptoms is unclear  Could be unspecified viral syndrome  He seems to be improving  He took 2 home COVID tests which were both negative  No specific therapies offered at this time  I advised him to let me know if his condition worsens      Problem List Items Addressed This Visit        Cardiovascular and Mediastinum    Hypertension    Relevant Orders    CBC and differential    Comprehensive metabolic panel    Lipid panel    TSH, 3rd generation with Free T4 reflex       Other    Prediabetes    Relevant Orders    Hemoglobin A1C      Other Visit Diagnoses     Screening for prostate cancer    -  Primary    Relevant Orders    PSA, Total Screen        Seen today for virtual visit  Medical history of hypertension, BPH, prediabetes    Friday night into Saturday/Sunday developed significant fatigue  Had no Pep"  Has had associated lack of appetite, myalgias  He is not aware of any sick contacts  He has not had any outdoors activities or recent travel, not aware of any tick bites  He has not had any headache, cough, nasal congestion, rhinorrhea, shortness of breath, nausea, vomiting  He had an abscess of the back which was addressed several months ago and has not had any other skin lesions of concern  No UTI symptoms  He has had intermittent fevers with a T-max of 102 2°  He states that he is feeling a little bit better today           Reason for visit is   Chief Complaint   Patient presents with    Virtual Regular Visit        Encounter provider Eddie Duarte DO    Provider located at [facility] [id number]      Recent Visits  No visits were found meeting these conditions    Showing recent visits within past 7 days and meeting all other requirements  Today's Visits  Date Type Provider Dept   06/27/22 Telemedicine Eddie Keyes DO [facility] [place]   Showing today's visits and meeting all other requirements  Future Appointments  No visits were found meeting these conditions  Showing future appointments within next 150 days and meeting all other requirements       The patient was identified by name and date of birth  Jenise Guadalupe was informed that this is a telemedicine visit and that the visit is being conducted through [address] and patient was informed this is a secure, HIPAA-complaint platform  He agrees to proceed     My office door was closed  No one else was in the room  He acknowledged consent and understanding of privacy and security of the video platform  The patient has agreed to participate and understands they can discontinue the visit at any time  Patient is aware this is a billable service  Subjective  Jenise Guadalupe is a 80 y o  male         HPI     Past Medical History:   Diagnosis Date    Exposure to hepatitis     treated with medication    Hearing loss of aging     History of blood transfusion     as a child    History of total left knee replacement     HL (hearing loss)     Hx of exposure to tuberculosis     treated in past with meds    Hyperlipidemia     Hypertension     OA (osteoarthritis)     bea  knees    Ocular hypertension of left eye     Tinnitus     Use of cane as ambulatory aid     Wears glasses        Past Surgical History:   Procedure Laterality Date    ADENOIDECTOMY      CATARACT EXTRACTION Bilateral     COLONOSCOPY      JOINT REPLACEMENT Left     knee    [place] TOTAL KNEE ARTHROPLASTY Left 7/14/2017    Procedure: ARTHROPLASTY KNEE TOTAL;  Surgeon: Briseyda Welsh MD;  [facility];  Service: Orthopedics    GA TOTAL KNEE ARTHROPLASTY Right 9/5/2017    Procedure: ARTHROPLASTY KNEE TOTAL;  Surgeon: Briseyda Welsh MD;  [facility];  Service: Orthopedics    TONSILLECTOMY      WISDOM TOOTH EXTRACTION         Current Outpatient Medications   Medication Sig Dispense Refill    amLODIPine (NORVASC) 5 mg tablet Take 1 tablet (5 mg total) by mouth daily 90 tablet 3    Ascorbic Acid (vitamin C) 1000 MG tablet Take 1,000 mg by mouth daily      cholecalciferol (VITAMIN D3) 1,000 units tablet Take 1,000 Units by mouth daily      hydrochlorothiazide (HYDRODIURIL) 25 mg tablet Take 1 tablet (25 mg total) by mouth daily 90 tablet 3    losartan (COZAAR) 100 MG tablet Take 1 tablet (100 mg total) by mouth daily 90 tablet 3    metoprolol succinate (TOPROL-XL) 50 mg 24 hr tablet Take 1 tablet (50 mg total) by mouth daily 90 tablet 3    Multiple Vitamin (MULTI-VITAMIN DAILY PO) Take 1 tablet by mouth daily      ROCKLATAN 0 02-0 005 % SOLN   4    spironolactone (ALDACTONE) 25 mg tablet Take 1 tablet (25 mg total) by mouth daily 90 tablet 3    tamsulosin (FLOMAX) 0 4 mg Take 1 capsule (0 4 mg total) by mouth daily at bedtime 90 capsule 3    zinc gluconate 50 mg tablet Take 50 mg by mouth daily (Patient not taking: Reported on 4/14/2022 )       No current facility-administered medications for this visit  Allergies   Allergen Reactions    Celebrex [Celecoxib] GI Intolerance     Nausea - pt able to tolerate with Prevacid    Influenza Vaccines        "I get the flu or feel horrible for months"       Review of Systems    Video Exam    There were no vitals filed for this visit  Physical Exam       VIRTUAL VISIT DISCLAIMER      Heber Ayala verbally agrees to participate in [employer]  Pt is aware that [employer] could be limited without vital signs or the ability to perform a full hands-on physical exam  Fredi Griffin understands he or the provider may request at any time to terminate the video visit and request the patient to seek care or treatment in person

## 2022-08-06 ENCOUNTER — APPOINTMENT (OUTPATIENT)
Dept: LAB | Facility: MEDICAL CENTER | Age: 72
End: 2022-08-06
Payer: COMMERCIAL

## 2022-08-06 DIAGNOSIS — R73.03 PREDIABETES: ICD-10-CM

## 2022-08-06 DIAGNOSIS — Z12.5 SCREENING FOR PROSTATE CANCER: ICD-10-CM

## 2022-08-06 DIAGNOSIS — I10 PRIMARY HYPERTENSION: ICD-10-CM

## 2022-08-06 LAB
ALBUMIN SERPL BCP-MCNC: 3.4 G/DL (ref 3.5–5)
ALP SERPL-CCNC: 133 U/L (ref 46–116)
ALT SERPL W P-5'-P-CCNC: 30 U/L (ref 12–78)
ANION GAP SERPL CALCULATED.3IONS-SCNC: 4 MMOL/L (ref 4–13)
AST SERPL W P-5'-P-CCNC: 22 U/L (ref 5–45)
BASOPHILS # BLD AUTO: 0.03 THOUSANDS/ΜL (ref 0–0.1)
BASOPHILS NFR BLD AUTO: 0 % (ref 0–1)
BILIRUB SERPL-MCNC: 0.63 MG/DL (ref 0.2–1)
BUN SERPL-MCNC: 29 MG/DL (ref 5–25)
CALCIUM ALBUM COR SERPL-MCNC: 9.9 MG/DL (ref 8.3–10.1)
CALCIUM SERPL-MCNC: 9.4 MG/DL (ref 8.3–10.1)
CHLORIDE SERPL-SCNC: 104 MMOL/L (ref 96–108)
CHOLEST SERPL-MCNC: 119 MG/DL
CO2 SERPL-SCNC: 26 MMOL/L (ref 21–32)
CREAT SERPL-MCNC: 1.57 MG/DL (ref 0.6–1.3)
EOSINOPHIL # BLD AUTO: 0.06 THOUSAND/ΜL (ref 0–0.61)
EOSINOPHIL NFR BLD AUTO: 1 % (ref 0–6)
ERYTHROCYTE [DISTWIDTH] IN BLOOD BY AUTOMATED COUNT: 12.4 % (ref 11.6–15.1)
EST. AVERAGE GLUCOSE BLD GHB EST-MCNC: 140 MG/DL
GFR SERPL CREATININE-BSD FRML MDRD: 44 ML/MIN/1.73SQ M
GLUCOSE P FAST SERPL-MCNC: 131 MG/DL (ref 65–99)
HBA1C MFR BLD: 6.5 %
HCT VFR BLD AUTO: 35.2 % (ref 36.5–49.3)
HDLC SERPL-MCNC: 22 MG/DL
HGB BLD-MCNC: 11.4 G/DL (ref 12–17)
IMM GRANULOCYTES # BLD AUTO: 0.05 THOUSAND/UL (ref 0–0.2)
IMM GRANULOCYTES NFR BLD AUTO: 1 % (ref 0–2)
LDLC SERPL CALC-MCNC: 59 MG/DL (ref 0–100)
LYMPHOCYTES # BLD AUTO: 1.43 THOUSANDS/ΜL (ref 0.6–4.47)
LYMPHOCYTES NFR BLD AUTO: 14 % (ref 14–44)
MCH RBC QN AUTO: 30.9 PG (ref 26.8–34.3)
MCHC RBC AUTO-ENTMCNC: 32.4 G/DL (ref 31.4–37.4)
MCV RBC AUTO: 95 FL (ref 82–98)
MONOCYTES # BLD AUTO: 0.67 THOUSAND/ΜL (ref 0.17–1.22)
MONOCYTES NFR BLD AUTO: 7 % (ref 4–12)
NEUTROPHILS # BLD AUTO: 7.95 THOUSANDS/ΜL (ref 1.85–7.62)
NEUTS SEG NFR BLD AUTO: 77 % (ref 43–75)
NONHDLC SERPL-MCNC: 97 MG/DL
NRBC BLD AUTO-RTO: 0 /100 WBCS
PLATELET # BLD AUTO: 316 THOUSANDS/UL (ref 149–390)
PMV BLD AUTO: 11.5 FL (ref 8.9–12.7)
POTASSIUM SERPL-SCNC: 4.7 MMOL/L (ref 3.5–5.3)
PROT SERPL-MCNC: 8 G/DL (ref 6.4–8.4)
PSA SERPL-MCNC: 1 NG/ML (ref 0–4)
RBC # BLD AUTO: 3.69 MILLION/UL (ref 3.88–5.62)
SODIUM SERPL-SCNC: 134 MMOL/L (ref 135–147)
TRIGL SERPL-MCNC: 190 MG/DL
TSH SERPL DL<=0.05 MIU/L-ACNC: 1.41 UIU/ML (ref 0.45–4.5)
WBC # BLD AUTO: 10.19 THOUSAND/UL (ref 4.31–10.16)

## 2022-08-06 PROCEDURE — 84443 ASSAY THYROID STIM HORMONE: CPT

## 2022-08-06 PROCEDURE — G0103 PSA SCREENING: HCPCS

## 2022-08-06 PROCEDURE — 80053 COMPREHEN METABOLIC PANEL: CPT

## 2022-08-06 PROCEDURE — 80061 LIPID PANEL: CPT

## 2022-08-06 PROCEDURE — 83036 HEMOGLOBIN GLYCOSYLATED A1C: CPT

## 2022-08-06 PROCEDURE — 36415 COLL VENOUS BLD VENIPUNCTURE: CPT

## 2022-08-06 PROCEDURE — 85025 COMPLETE CBC W/AUTO DIFF WBC: CPT

## 2022-08-08 ENCOUNTER — HOSPITAL ENCOUNTER (INPATIENT)
Facility: HOSPITAL | Age: 72
LOS: 2 days | Discharge: HOME | DRG: 684 | End: 2022-08-10
Attending: EMERGENCY MEDICINE | Admitting: INTERNAL MEDICINE
Payer: COMMERCIAL

## 2022-08-08 ENCOUNTER — APPOINTMENT (EMERGENCY)
Dept: RADIOLOGY | Facility: HOSPITAL | Age: 72
DRG: 684 | End: 2022-08-08
Payer: COMMERCIAL

## 2022-08-08 DIAGNOSIS — N17.9 AKI (ACUTE KIDNEY INJURY) (HCC): Primary | ICD-10-CM

## 2022-08-08 DIAGNOSIS — R06.09 DYSPNEA ON EXERTION: ICD-10-CM

## 2022-08-08 DIAGNOSIS — I10 ESSENTIAL HYPERTENSION: ICD-10-CM

## 2022-08-08 DIAGNOSIS — R79.89 ELEVATED BRAIN NATRIURETIC PEPTIDE (BNP) LEVEL: ICD-10-CM

## 2022-08-08 DIAGNOSIS — I10 HYPERTENSION, UNSPECIFIED TYPE: ICD-10-CM

## 2022-08-08 PROBLEM — R42 LIGHTHEADEDNESS: Status: ACTIVE | Noted: 2022-08-08

## 2022-08-08 PROBLEM — R06.00 DYSPNEA ON EXERTION: Status: ACTIVE | Noted: 2022-08-08

## 2022-08-08 PROBLEM — R59.9 LYMPH NODES ENLARGED: Status: ACTIVE | Noted: 2022-08-08

## 2022-08-08 PROBLEM — D64.9 NORMOCYTIC ANEMIA: Status: ACTIVE | Noted: 2022-08-08

## 2022-08-08 PROBLEM — N40.0 ENLARGED PROSTATE: Status: ACTIVE | Noted: 2022-08-08

## 2022-08-08 LAB
2HR DELTA HS TROPONIN: 0 NG/L
4HR DELTA HS TROPONIN: 1 NG/L
ALBUMIN SERPL BCP-MCNC: 3.4 G/DL (ref 3.5–5)
ALP SERPL-CCNC: 125 U/L (ref 46–116)
ALT SERPL W P-5'-P-CCNC: 26 U/L (ref 12–78)
ANION GAP SERPL CALCULATED.3IONS-SCNC: 11 MMOL/L (ref 4–13)
AST SERPL W P-5'-P-CCNC: 15 U/L (ref 5–45)
BASOPHILS # BLD AUTO: 0.04 THOUSANDS/ΜL (ref 0–0.1)
BASOPHILS NFR BLD AUTO: 0 % (ref 0–1)
BILIRUB DIRECT SERPL-MCNC: 0.14 MG/DL (ref 0–0.2)
BILIRUB SERPL-MCNC: 0.35 MG/DL (ref 0.2–1)
BUN SERPL-MCNC: 31 MG/DL (ref 5–25)
CALCIUM SERPL-MCNC: 9 MG/DL (ref 8.3–10.1)
CARDIAC TROPONIN I PNL SERPL HS: 6 NG/L
CARDIAC TROPONIN I PNL SERPL HS: 6 NG/L
CARDIAC TROPONIN I PNL SERPL HS: 7 NG/L
CHLORIDE SERPL-SCNC: 101 MMOL/L (ref 96–108)
CO2 SERPL-SCNC: 25 MMOL/L (ref 21–32)
CREAT SERPL-MCNC: 2.24 MG/DL (ref 0.6–1.3)
D DIMER PPP FEU-MCNC: 0.41 UG/ML FEU
EOSINOPHIL # BLD AUTO: 0.06 THOUSAND/ΜL (ref 0–0.61)
EOSINOPHIL NFR BLD AUTO: 1 % (ref 0–6)
ERYTHROCYTE [DISTWIDTH] IN BLOOD BY AUTOMATED COUNT: 12.6 % (ref 11.6–15.1)
GFR SERPL CREATININE-BSD FRML MDRD: 28 ML/MIN/1.73SQ M
GLUCOSE SERPL-MCNC: 120 MG/DL (ref 65–140)
HCT VFR BLD AUTO: 31.6 % (ref 36.5–49.3)
HGB BLD-MCNC: 10.3 G/DL (ref 12–17)
IMM GRANULOCYTES # BLD AUTO: 0.08 THOUSAND/UL (ref 0–0.2)
IMM GRANULOCYTES NFR BLD AUTO: 1 % (ref 0–2)
LYMPHOCYTES # BLD AUTO: 1.97 THOUSANDS/ΜL (ref 0.6–4.47)
LYMPHOCYTES NFR BLD AUTO: 16 % (ref 14–44)
MCH RBC QN AUTO: 30.7 PG (ref 26.8–34.3)
MCHC RBC AUTO-ENTMCNC: 32.6 G/DL (ref 31.4–37.4)
MCV RBC AUTO: 94 FL (ref 82–98)
MONOCYTES # BLD AUTO: 0.88 THOUSAND/ΜL (ref 0.17–1.22)
MONOCYTES NFR BLD AUTO: 7 % (ref 4–12)
NEUTROPHILS # BLD AUTO: 9.1 THOUSANDS/ΜL (ref 1.85–7.62)
NEUTS SEG NFR BLD AUTO: 75 % (ref 43–75)
NRBC BLD AUTO-RTO: 0 /100 WBCS
NT-PROBNP SERPL-MCNC: 2211 PG/ML
PLATELET # BLD AUTO: 319 THOUSANDS/UL (ref 149–390)
PMV BLD AUTO: 10.7 FL (ref 8.9–12.7)
POTASSIUM SERPL-SCNC: 4.3 MMOL/L (ref 3.5–5.3)
PROT SERPL-MCNC: 7.9 G/DL (ref 6.4–8.4)
RBC # BLD AUTO: 3.35 MILLION/UL (ref 3.88–5.62)
SODIUM SERPL-SCNC: 137 MMOL/L (ref 135–147)
TSH SERPL DL<=0.05 MIU/L-ACNC: 1.72 UIU/ML (ref 0.45–4.5)
WBC # BLD AUTO: 12.13 THOUSAND/UL (ref 4.31–10.16)

## 2022-08-08 PROCEDURE — 85379 FIBRIN DEGRADATION QUANT: CPT | Performed by: INTERNAL MEDICINE

## 2022-08-08 PROCEDURE — 84443 ASSAY THYROID STIM HORMONE: CPT | Performed by: EMERGENCY MEDICINE

## 2022-08-08 PROCEDURE — 86617 LYME DISEASE ANTIBODY: CPT | Performed by: EMERGENCY MEDICINE

## 2022-08-08 PROCEDURE — 83880 ASSAY OF NATRIURETIC PEPTIDE: CPT | Performed by: EMERGENCY MEDICINE

## 2022-08-08 PROCEDURE — 71045 X-RAY EXAM CHEST 1 VIEW: CPT

## 2022-08-08 PROCEDURE — 85025 COMPLETE CBC W/AUTO DIFF WBC: CPT | Performed by: EMERGENCY MEDICINE

## 2022-08-08 PROCEDURE — 36415 COLL VENOUS BLD VENIPUNCTURE: CPT | Performed by: EMERGENCY MEDICINE

## 2022-08-08 PROCEDURE — 99223 1ST HOSP IP/OBS HIGH 75: CPT | Performed by: INTERNAL MEDICINE

## 2022-08-08 PROCEDURE — 99285 EMERGENCY DEPT VISIT HI MDM: CPT

## 2022-08-08 PROCEDURE — 82977 ASSAY OF GGT: CPT | Performed by: INTERNAL MEDICINE

## 2022-08-08 PROCEDURE — 80048 BASIC METABOLIC PNL TOTAL CA: CPT | Performed by: EMERGENCY MEDICINE

## 2022-08-08 PROCEDURE — 84484 ASSAY OF TROPONIN QUANT: CPT | Performed by: EMERGENCY MEDICINE

## 2022-08-08 PROCEDURE — 86618 LYME DISEASE ANTIBODY: CPT | Performed by: EMERGENCY MEDICINE

## 2022-08-08 PROCEDURE — 93005 ELECTROCARDIOGRAM TRACING: CPT

## 2022-08-08 PROCEDURE — 80076 HEPATIC FUNCTION PANEL: CPT | Performed by: EMERGENCY MEDICINE

## 2022-08-08 RX ORDER — HEPARIN SODIUM 5000 [USP'U]/ML
5000 INJECTION, SOLUTION INTRAVENOUS; SUBCUTANEOUS EVERY 8 HOURS SCHEDULED
Status: DISCONTINUED | OUTPATIENT
Start: 2022-08-08 | End: 2022-08-10 | Stop reason: HOSPADM

## 2022-08-08 RX ORDER — ACETAMINOPHEN 325 MG/1
650 TABLET ORAL EVERY 6 HOURS PRN
Status: DISCONTINUED | OUTPATIENT
Start: 2022-08-08 | End: 2022-08-10 | Stop reason: HOSPADM

## 2022-08-08 RX ADMIN — SODIUM CHLORIDE 500 ML: 0.9 INJECTION, SOLUTION INTRAVENOUS at 19:56

## 2022-08-08 RX ADMIN — HEPARIN SODIUM 5000 UNITS: 5000 INJECTION INTRAVENOUS; SUBCUTANEOUS at 23:08

## 2022-08-09 ENCOUNTER — APPOINTMENT (INPATIENT)
Dept: NON INVASIVE DIAGNOSTICS | Facility: HOSPITAL | Age: 72
DRG: 684 | End: 2022-08-09
Payer: COMMERCIAL

## 2022-08-09 ENCOUNTER — APPOINTMENT (INPATIENT)
Dept: CT IMAGING | Facility: HOSPITAL | Age: 72
DRG: 684 | End: 2022-08-09
Payer: COMMERCIAL

## 2022-08-09 LAB
ALBUMIN SERPL BCP-MCNC: 2.9 G/DL (ref 3.5–5)
ALP SERPL-CCNC: 107 U/L (ref 46–116)
ALT SERPL W P-5'-P-CCNC: 22 U/L (ref 12–78)
ANION GAP SERPL CALCULATED.3IONS-SCNC: 11 MMOL/L (ref 4–13)
AORTIC ROOT: 3.4 CM
AORTIC VALVE MEAN VELOCITY: 12.6 M/S
APTT PPP: 39 SECONDS (ref 23–37)
AST SERPL W P-5'-P-CCNC: 15 U/L (ref 5–45)
ATRIAL RATE: 54 BPM
AV LVOT MEAN GRADIENT: 3 MMHG
AV LVOT PEAK GRADIENT: 5 MMHG
AV MEAN GRADIENT: 7 MMHG
AV PEAK GRADIENT: 12 MMHG
AV VELOCITY RATIO: 0.62
B BURGDOR IGG+IGM SER-ACNC: >8 AI
BASOPHILS # BLD AUTO: 0.05 THOUSANDS/ΜL (ref 0–0.1)
BASOPHILS NFR BLD AUTO: 1 % (ref 0–1)
BILIRUB SERPL-MCNC: 0.31 MG/DL (ref 0.2–1)
BILIRUB UR QL STRIP: NEGATIVE
BUN SERPL-MCNC: 32 MG/DL (ref 5–25)
CALCIUM ALBUM COR SERPL-MCNC: 9.4 MG/DL (ref 8.3–10.1)
CALCIUM SERPL-MCNC: 8.5 MG/DL (ref 8.3–10.1)
CHLORIDE SERPL-SCNC: 104 MMOL/L (ref 96–108)
CHOLEST SERPL-MCNC: 110 MG/DL
CLARITY UR: NORMAL
CO2 SERPL-SCNC: 24 MMOL/L (ref 21–32)
COLOR UR: YELLOW
CREAT SERPL-MCNC: 1.78 MG/DL (ref 0.6–1.3)
CREAT UR-MCNC: 139.5 MG/DL
CREAT UR-MCNC: 160.2 MG/DL
CREAT UR-MCNC: 166 MG/DL
DOP CALC AO PEAK VEL: 1.77 M/S
DOP CALC AO VTI: 40.4 CM
DOP CALC LVOT PEAK VEL VTI: 24.52 CM
DOP CALC LVOT PEAK VEL: 1.09 M/S
E WAVE DECELERATION TIME: 229 MS
EOSINOPHIL # BLD AUTO: 0.11 THOUSAND/ΜL (ref 0–0.61)
EOSINOPHIL NFR BLD AUTO: 1 % (ref 0–6)
ERYTHROCYTE [DISTWIDTH] IN BLOOD BY AUTOMATED COUNT: 12.7 % (ref 11.6–15.1)
FERRITIN SERPL-MCNC: 446 NG/ML (ref 8–388)
FOLATE SERPL-MCNC: >20 NG/ML (ref 3.1–17.5)
FRACTIONAL SHORTENING: 33 % (ref 28–44)
GFR SERPL CREATININE-BSD FRML MDRD: 37 ML/MIN/1.73SQ M
GGT SERPL-CCNC: 72 U/L (ref 5–85)
GLUCOSE SERPL-MCNC: 133 MG/DL (ref 65–140)
GLUCOSE UR STRIP-MCNC: NEGATIVE MG/DL
HCT VFR BLD AUTO: 31.1 % (ref 36.5–49.3)
HDLC SERPL-MCNC: 22 MG/DL
HGB BLD-MCNC: 9.9 G/DL (ref 12–17)
HGB UR QL STRIP.AUTO: NEGATIVE
IMM GRANULOCYTES # BLD AUTO: 0.05 THOUSAND/UL (ref 0–0.2)
IMM GRANULOCYTES NFR BLD AUTO: 1 % (ref 0–2)
INR PPP: 1.17 (ref 0.84–1.19)
INTERVENTRICULAR SEPTUM IN DIASTOLE (PARASTERNAL SHORT AXIS VIEW): 1 CM
INTERVENTRICULAR SEPTUM: 1 CM (ref 0.6–1.1)
IRON SATN MFR SERPL: 18 % (ref 20–50)
IRON SERPL-MCNC: 56 UG/DL (ref 65–175)
KETONES UR STRIP-MCNC: NEGATIVE MG/DL
LAAS-AP2: 21.3 CM2
LAAS-AP4: 21 CM2
LDLC SERPL CALC-MCNC: 53 MG/DL (ref 0–100)
LEFT ATRIUM SIZE: 3.2 CM
LEFT INTERNAL DIMENSION IN SYSTOLE: 3.7 CM (ref 2.1–4)
LEFT VENTRICULAR INTERNAL DIMENSION IN DIASTOLE: 5.5 CM (ref 3.5–6)
LEFT VENTRICULAR POSTERIOR WALL IN END DIASTOLE: 1 CM
LEFT VENTRICULAR STROKE VOLUME: 92 ML
LEUKOCYTE ESTERASE UR QL STRIP: NEGATIVE
LVSV (TEICH): 92 ML
LYMPHOCYTES # BLD AUTO: 1.96 THOUSANDS/ΜL (ref 0.6–4.47)
LYMPHOCYTES NFR BLD AUTO: 22 % (ref 14–44)
MAGNESIUM SERPL-MCNC: 2.1 MG/DL (ref 1.6–2.6)
MCH RBC QN AUTO: 30.7 PG (ref 26.8–34.3)
MCHC RBC AUTO-ENTMCNC: 31.8 G/DL (ref 31.4–37.4)
MCV RBC AUTO: 96 FL (ref 82–98)
MICROALBUMIN UR-MCNC: 9.1 MG/L (ref 0–20)
MICROALBUMIN/CREAT 24H UR: 5 MG/G CREATININE (ref 0–30)
MONOCYTES # BLD AUTO: 0.69 THOUSAND/ΜL (ref 0.17–1.22)
MONOCYTES NFR BLD AUTO: 8 % (ref 4–12)
MV E'TISSUE VEL-LAT: 16 CM/S
MV E'TISSUE VEL-SEP: 13 CM/S
MV PEAK A VEL: 0.68 M/S
MV PEAK E VEL: 88 CM/S
MV STENOSIS PRESSURE HALF TIME: 67 MS
MV VALVE AREA P 1/2 METHOD: 3.28 CM2
NEUTROPHILS # BLD AUTO: 5.98 THOUSANDS/ΜL (ref 1.85–7.62)
NEUTS SEG NFR BLD AUTO: 67 % (ref 43–75)
NITRITE UR QL STRIP: NEGATIVE
NONHDLC SERPL-MCNC: 88 MG/DL
NRBC BLD AUTO-RTO: 0 /100 WBCS
P AXIS: 48 DEGREES
PH UR STRIP.AUTO: 5.5 [PH]
PLATELET # BLD AUTO: 290 THOUSANDS/UL (ref 149–390)
PMV BLD AUTO: 10.9 FL (ref 8.9–12.7)
POTASSIUM SERPL-SCNC: 4.1 MMOL/L (ref 3.5–5.3)
PR INTERVAL: 304 MS
PROT SERPL-MCNC: 7 G/DL (ref 6.4–8.4)
PROT UR STRIP-MCNC: NEGATIVE MG/DL
PROT UR-MCNC: 16 MG/DL
PROT/CREAT UR: 0.1 MG/G{CREAT} (ref 0–0.1)
PROTHROMBIN TIME: 14.9 SECONDS (ref 11.6–14.5)
QRS AXIS: 25 DEGREES
QRSD INTERVAL: 104 MS
QT INTERVAL: 436 MS
QTC INTERVAL: 413 MS
RBC # BLD AUTO: 3.23 MILLION/UL (ref 3.88–5.62)
RIGHT ATRIAL 2D VOLUME: 43 ML
RIGHT ATRIUM AREA SYSTOLE A4C: 16.8 CM2
RIGHT VENTRICLE ID DIMENSION: 3.5 CM
SL CV LEFT ATRIUM LENGTH A2C: 5.4 CM
SL CV LV EF: 55
SL CV PED ECHO LEFT VENTRICLE DIASTOLIC VOLUME (MOD BIPLANE) 2D: 149 ML
SL CV PED ECHO LEFT VENTRICLE SYSTOLIC VOLUME (MOD BIPLANE) 2D: 57 ML
SODIUM 24H UR-SCNC: 105 MOL/L
SODIUM SERPL-SCNC: 139 MMOL/L (ref 135–147)
SP GR UR STRIP.AUTO: 1.02 (ref 1–1.03)
T WAVE AXIS: 24 DEGREES
TIBC SERPL-MCNC: 317 UG/DL (ref 250–450)
TR MAX PG: 22 MMHG
TR PEAK VELOCITY: 2.4 M/S
TRICUSPID VALVE PEAK REGURGITATION VELOCITY: 2.37 M/S
TRIGL SERPL-MCNC: 175 MG/DL
UROBILINOGEN UR QL STRIP.AUTO: 0.2 E.U./DL
VENTRICULAR RATE: 54 BPM
VIT B12 SERPL-MCNC: 415 PG/ML (ref 100–900)
WBC # BLD AUTO: 8.84 THOUSAND/UL (ref 4.31–10.16)

## 2022-08-09 PROCEDURE — 82746 ASSAY OF FOLIC ACID SERUM: CPT | Performed by: INTERNAL MEDICINE

## 2022-08-09 PROCEDURE — 99254 IP/OBS CNSLTJ NEW/EST MOD 60: CPT | Performed by: INTERNAL MEDICINE

## 2022-08-09 PROCEDURE — 99232 SBSQ HOSP IP/OBS MODERATE 35: CPT | Performed by: HOSPITALIST

## 2022-08-09 PROCEDURE — 84300 ASSAY OF URINE SODIUM: CPT | Performed by: INTERNAL MEDICINE

## 2022-08-09 PROCEDURE — G1004 CDSM NDSC: HCPCS

## 2022-08-09 PROCEDURE — 85730 THROMBOPLASTIN TIME PARTIAL: CPT | Performed by: INTERNAL MEDICINE

## 2022-08-09 PROCEDURE — 83735 ASSAY OF MAGNESIUM: CPT | Performed by: INTERNAL MEDICINE

## 2022-08-09 PROCEDURE — 93010 ELECTROCARDIOGRAM REPORT: CPT | Performed by: INTERNAL MEDICINE

## 2022-08-09 PROCEDURE — 82570 ASSAY OF URINE CREATININE: CPT | Performed by: INTERNAL MEDICINE

## 2022-08-09 PROCEDURE — 99285 EMERGENCY DEPT VISIT HI MDM: CPT | Performed by: EMERGENCY MEDICINE

## 2022-08-09 PROCEDURE — 74176 CT ABD & PELVIS W/O CONTRAST: CPT

## 2022-08-09 PROCEDURE — 82728 ASSAY OF FERRITIN: CPT | Performed by: INTERNAL MEDICINE

## 2022-08-09 PROCEDURE — 93306 TTE W/DOPPLER COMPLETE: CPT | Performed by: INTERNAL MEDICINE

## 2022-08-09 PROCEDURE — 82043 UR ALBUMIN QUANTITATIVE: CPT | Performed by: INTERNAL MEDICINE

## 2022-08-09 PROCEDURE — 85025 COMPLETE CBC W/AUTO DIFF WBC: CPT | Performed by: INTERNAL MEDICINE

## 2022-08-09 PROCEDURE — 83550 IRON BINDING TEST: CPT | Performed by: INTERNAL MEDICINE

## 2022-08-09 PROCEDURE — 82607 VITAMIN B-12: CPT | Performed by: INTERNAL MEDICINE

## 2022-08-09 PROCEDURE — 83540 ASSAY OF IRON: CPT | Performed by: INTERNAL MEDICINE

## 2022-08-09 PROCEDURE — 84156 ASSAY OF PROTEIN URINE: CPT | Performed by: INTERNAL MEDICINE

## 2022-08-09 PROCEDURE — 85610 PROTHROMBIN TIME: CPT | Performed by: INTERNAL MEDICINE

## 2022-08-09 PROCEDURE — 93306 TTE W/DOPPLER COMPLETE: CPT

## 2022-08-09 PROCEDURE — 80053 COMPREHEN METABOLIC PANEL: CPT | Performed by: INTERNAL MEDICINE

## 2022-08-09 PROCEDURE — 80061 LIPID PANEL: CPT | Performed by: INTERNAL MEDICINE

## 2022-08-09 PROCEDURE — 81003 URINALYSIS AUTO W/O SCOPE: CPT | Performed by: INTERNAL MEDICINE

## 2022-08-09 RX ORDER — SODIUM CHLORIDE, SODIUM GLUCONATE, SODIUM ACETATE, POTASSIUM CHLORIDE, MAGNESIUM CHLORIDE, SODIUM PHOSPHATE, DIBASIC, AND POTASSIUM PHOSPHATE .53; .5; .37; .037; .03; .012; .00082 G/100ML; G/100ML; G/100ML; G/100ML; G/100ML; G/100ML; G/100ML
75 INJECTION, SOLUTION INTRAVENOUS ONCE
Status: COMPLETED | OUTPATIENT
Start: 2022-08-09 | End: 2022-08-09

## 2022-08-09 RX ORDER — TAMSULOSIN HYDROCHLORIDE 0.4 MG/1
0.4 CAPSULE ORAL
Status: DISCONTINUED | OUTPATIENT
Start: 2022-08-09 | End: 2022-08-10 | Stop reason: HOSPADM

## 2022-08-09 RX ADMIN — HEPARIN SODIUM 5000 UNITS: 5000 INJECTION INTRAVENOUS; SUBCUTANEOUS at 13:06

## 2022-08-09 RX ADMIN — SODIUM CHLORIDE, SODIUM GLUCONATE, SODIUM ACETATE, POTASSIUM CHLORIDE, MAGNESIUM CHLORIDE, SODIUM PHOSPHATE, DIBASIC, AND POTASSIUM PHOSPHATE 75 ML/HR: .53; .5; .37; .037; .03; .012; .00082 INJECTION, SOLUTION INTRAVENOUS at 10:05

## 2022-08-09 RX ADMIN — TAMSULOSIN HYDROCHLORIDE 0.4 MG: 0.4 CAPSULE ORAL at 21:28

## 2022-08-09 RX ADMIN — HEPARIN SODIUM 5000 UNITS: 5000 INJECTION INTRAVENOUS; SUBCUTANEOUS at 05:19

## 2022-08-09 RX ADMIN — HEPARIN SODIUM 5000 UNITS: 5000 INJECTION INTRAVENOUS; SUBCUTANEOUS at 21:28

## 2022-08-09 NOTE — H&P
Navneet Castaneda 1950, 80 y o  male MRN: [id number]  Unit/Bed#: E4 -01 Encounter: [id number]  Primary Care Provider: Solange Carey DO   Date and time admitted to hospital: 8/8/2022  6:35 PM    * Lightheadedness  Assessment & Plan  -3 episodes of near syncope over the past 2 weeks with most recent occurring earlier in the day today while patient was outside exerting himself  Denies any chest pain  -also notes exertional dyspnea worsening over the past 2 weeks  -upon arrival in ER patient noted to be bradycardic  EKG sinus bradycardia, rate 49, first-degree AV block, two second sinus pause, no st elevation  -troponin negative x1  -BNP elevated at 2200  -chest x-ray reviewed and no large effusion or focal infiltrate per my read awaiting official read  -clinically patient appears hypervolemic with bilateral lower extremity pitting edema, abdominal distention  -no echocardiograms in our system were able to be found in care everywhere; patient's medications of a beta-blocker, Arb and Aldactone can be seen if patient has a history of HFrEF although I have no evidence of this in chart and family denies any history of this  -not on any PTA diuretics  -additionally has JOSE LUIS with creatinine 2 2 as well as normocytic anemia with hemoglobin 10 3  Plan  > admit to medicine on telemetry  Trend troponin to ensure no rise per protocol  Patient's EKG as well as near-syncope concerning for symptomatic bradycardia  Hold patient's PTA metoprolol as well as his PTA antihypertensive agents which include amlodipine, Aldactone, losartan and HCTZ  Consult Cardiology  Make NPO after midnight in case need for any possible cardiac procedure in the morning  > with respect to patient's dyspnea on exertion, we are trending troponins to ensure no acute arise and we will have Cardiology evaluate patient to assess whether this could be a anginal equivalent    Additionally, given patient's hypervolemic status, order a 2D echo to assess for possible underlying heart failure  Given patient's JOSE LUIS though, we will hold off of any diuresis overnight  > we will also order a D-dimer  If positive, patient will need bilateral lower extremity venous duplex as well as V/Q scan to definitively rule out PE    > check orthostatics  > JOSE LUIS workup as below  > daily labs    Dyspnea on exertion  Assessment & Plan  As above in lightheadedness section    JOSE LUIS (acute kidney injury) ([facility] [address] )  Assessment & Plan  Creatinine 2 2 on labs and was 1 53 days ago and 1 3 in October 2021  Appears patient probably has baseline CKD with creatinine baseline probably around 1 3-1 5  Given 500 cc IV fluid in the ER  Bladder scan performed with less than 70 cc  Patient has a history of prostatomegaly and patient also underwent a CT scan in October 2021 that was notable for the prostatomegaly as well as notable for stranding of the small bowel mesentery with borderline lymph nodes that was 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 though early lymphoma could present similarly and a recommended follow-up CT in 6 months was recommended at that time but patient has not gotten that performed  Plan  > order CT abdomen pelvis without contrast   Order urinalysis as well as urine studies to calculate FENA  Urinary retention protocol and consult Nephrology for further assistance    > hold PTA nephrotoxic agents   > daily BMP      Enlarged prostate  Assessment & Plan  History of prostatomegaly for which he has seen Urology in the past and they had at 1 point recommended a prostate surgery  Plan  > further evaluate with CT imaging    Lymph nodes enlarged  Assessment & Plan  See above for details in JOSE LUIS section    Normocytic anemia  Assessment & Plan  Hemoglobin 10 3 with MCV in the mid 90s  Hemoglobin has been slowly down trending over the past year from 12  Plan  > order iron studies, B12, folate  > CT abdomen pelvis ordered as above  > patient may need consult with Hematology and or GI team during hospitalization depending on workup and where hemoglobin trends to  Additionally patient with a mild leukocytosis of 12 13 but afebrile  Current etiology thought to be reactive in nature but will closely monitor for any possible infection  With patient's anemia, mild leukocytosis and borderline enlarged lymph nodes on CT back in October 2021 we are further evaluating with the CT imaging  VTE Prophylaxis: Heparin  / sequential compression device   Code Status: Level 1 - Full Code       Anticipated Length of Stay:  Patient will be admitted on an Inpatient basis with an anticipated length of stay of  > 2 midnights  Justification for Hospital Stay: Please see detailed plans noted above  Chief Complaint:     Lightheadedness, dyspnea on exertion, JOSE LUIS  History of Present Illness:  Kassidy Dickey is a 80 y o  male who has past medical history significant for prediabetes, hypertension, hyperlipidemia, BPH who presented to [facility] ER on the evening of 8/8 with symptoms of 3 episodes of near-syncope over the past 2 weeks as well as dyspnea on exertion  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 a nurse  Patient reports that earlier today he was outside working when he felt very lightheaded and felt like he was going to pass out  Patient denies any loss of consciousness or that he passed out  Patient reported feeling dyspnea on exertion at the time but denied any chest pain  Patient reports that this is the 3rd such similar episode in the past 2 weeks he has had  Patient reports that he has been quite active over the last several months and that these episodes of near passing out have been or common only over the past 2 weeks  Patient denies any history of coronary artery disease were any diagnosis of heart failure    Patient additionally has bilateral lower extremity pitting edema but per patient and family this is apparently the way his legs look at baseline  Patient also was informed of his elevated renal function and reported that he does think he has been drinking less fluids recently  Patient further reports that he has a history of an enlarged prostate for which he has seen Urology in the past recommended a urologic procedure to help with any possible BPH symptoms  I went over patient's labs as well as his imaging studies and EKG with them and patient's wife reports that patient's mother had a history of symptomatic bradycardia for which she required pacemaker in her 76s  Patient additionally reported an episode of a fever of 105 about a month ago that physicians told him was reportedly related to viral illness and that which apparently resolved on its own  Patient denies any fevers currently  Patient denies any back pain or dysuria        Review of Systems:    Constitutional:  Positive for lightheadedness  Eyes:  Denies change in visual acuity   HENT:  Denies nasal congestion or sore throat   Respiratory:  Positive for dyspnea on exertion  Cardiovascular:  Denies chest pain or edema   GI:  Denies abdominal pain or bloody stools  :  Denies dysuria   Musculoskeletal:  Denies back pain or joint pain   Integument:  Denies rash   Neurologic:  Denies headache or sensory changes   Endocrine:  Denies polyuria or polydipsia   Lymphatic:  Denies swollen glands   Psychiatric:  Denies depression or anxiety     Past Medical and Surgical History:   Past Medical History:   Diagnosis Date    Exposure to hepatitis     treated with medication    Hearing loss of aging     History of blood transfusion     as a child    History of total left knee replacement     HL (hearing loss)     Hx of exposure to tuberculosis     treated in past with meds    Hyperlipidemia     Hypertension     OA (osteoarthritis)     bea  knees    Ocular hypertension of left eye     Tinnitus     Use of cane as ambulatory aid     Wears glasses      Past Surgical History:   Procedure Laterality Date    ADENOIDECTOMY      CATARACT EXTRACTION Bilateral     COLONOSCOPY      JOINT REPLACEMENT Left     knee    NM TOTAL KNEE ARTHROPLASTY Left 7/14/2017    Procedure: ARTHROPLASTY KNEE TOTAL;  Surgeon: Harish Campa MD;  [facility];  Service: Orthopedics    NM TOTAL KNEE ARTHROPLASTY Right 9/5/2017    Procedure: ARTHROPLASTY KNEE TOTAL;  Surgeon: Harish Campa MD;  [facility];  Service: Orthopedics    TONSILLECTOMY      WISDOM TOOTH EXTRACTION         Meds/Allergies:  Medications Prior to Admission   Medication Sig Dispense Refill Last Dose    amLODIPine (NORVASC) 5 mg tablet Take 1 tablet (5 mg total) by mouth daily 90 tablet 3 8/8/2022 at Unknown time    Ascorbic Acid (vitamin C) 1000 MG tablet Take 1,000 mg by mouth daily   8/8/2022 at Unknown time    cholecalciferol (VITAMIN D3) 1,000 units tablet Take 1,000 Units by mouth daily   8/8/2022 at Unknown time    hydrochlorothiazide (HYDRODIURIL) 25 mg tablet Take 1 tablet (25 mg total) by mouth daily 90 tablet 3 8/8/2022 at Unknown time    losartan (COZAAR) 100 MG tablet Take 1 tablet (100 mg total) by mouth daily 90 tablet 3 8/8/2022 at Unknown time    metoprolol succinate (TOPROL-XL) 50 mg 24 hr tablet Take 1 tablet (50 mg total) by mouth daily 90 tablet 3 8/8/2022 at Unknown time    Multiple Vitamin (MULTI-VITAMIN DAILY PO) Take 1 tablet by mouth daily   8/8/2022 at Unknown time    ROCKLATAN 0 02-0 005 % SOLN   4 8/8/2022 at Unknown time    spironolactone (ALDACTONE) 25 mg tablet Take 1 tablet (25 mg total) by mouth daily 90 tablet 3 8/8/2022 at Unknown time    tamsulosin (FLOMAX) 0 4 mg Take 1 capsule (0 4 mg total) by mouth daily at bedtime 90 capsule 3 8/8/2022 at Unknown time    zinc gluconate 50 mg tablet Take 50 mg by mouth daily (Patient not taking: No sig reported)   Not Taking at Unknown time       Allergies:    Allergies Allergen Reactions    Celebrex [Celecoxib] GI Intolerance     Nausea - pt able to tolerate with Prevacid    Influenza Vaccines        "I get the flu or feel horrible for months"       History:  Marital Status: /Civil Union     Substance Use History:   Social History     Substance and Sexual Activity   Alcohol Use No     Social History     Tobacco Use   Smoking Status Former Smoker    Quit date: 1972    Years since quittin 1   Smokeless Tobacco Never Used   Tobacco Comment    per [employer]:  Never a smoker     Social History     Substance and Sexual Activity   Drug Use No       Family History:  Family History   Problem Relation Age of Onset    Diabetes Mother     COPD Father     Cancer Father         Gastric    Cancer Paternal Uncle         Gastric    Breast cancer Family     Thyroid cancer Family        Physical Exam:     Vitals:   Blood Pressure: 124/57 (22)  Pulse: 58 (22)  Temperature: 98 8 °F (37 1 °C) (22)  Temp Source: Temporal (22)  Respirations: 18 (22)  Height: 5' 8" (172 7 cm) (22)  Weight - Scale: 110 kg (242 lb 1 oz) (22)  SpO2: 97 % (22)    Constitutional:  A&O x4, Non-toxic appearance  Eyes:  EOMI, No scleral icterus   HENT:   oropharynx moist, external ears normal, external nose normal   Respiratory:  No respiratory distress, no wheezing   Cardiovascular:  Bradycardia, no murmurs   GI:  Soft, mildly distended, no guarding   :  No costovertebral angle tenderness   Musculoskeletal:  Pitting edema of the bilateral lower extremities  Integument:  no jaundice, no rash   Neurologic:  Alert &awake, communicative, CN 2-12 normal,  no focal deficits noted   Psychiatric:  Speech and behavior appropriate       Lab Results: I have personally reviewed pertinent reports        Results from last 7 days   Lab Units 22  1904   WBC Thousand/uL 12 13*   HEMOGLOBIN g/dL 10 3*   HEMATOCRIT % 31 6* PLATELETS Thousands/uL 319   NEUTROS PCT % 75   LYMPHS PCT % 16   MONOS PCT % 7   EOS PCT % 1     Results from last 7 days   Lab Units 08/08/22  1904   POTASSIUM mmol/L 4 3   CHLORIDE mmol/L 101   CO2 mmol/L 25   BUN mg/dL 31*   CREATININE mg/dL 2 24*   CALCIUM mg/dL 9 0   ALK PHOS U/L 125*   ALT U/L 26   AST U/L 15             Imaging: I have personally reviewed pertinent reports  No results found  Total time for visit, including counseling/coordination of care: 45 minutes  Greater than 50% of this total time spent on direct patient counseling and coorination of care  Epic Records Reviewed as well as Records in Care Everywhere    ** Please Note: Dragon 360 Dictation voice to text software was used in the creation of this document   **

## 2022-08-09 NOTE — PLAN OF CARE
Problem: Potential for Falls  Goal: Patient will remain free of falls  Description: INTERVENTIONS:  - Educate patient/family on patient safety including physical limitations  - Instruct patient to call for assistance with activity   - Consult OT/PT to assist with strengthening/mobility   - Keep Call bell within reach  - Keep bed low and locked with side rails adjusted as appropriate  - Keep care items and personal belongings within reach  - Initiate and maintain comfort rounds  - Make Fall Risk Sign visible to staff  - Offer Toileting every  Hours, in advance of need  - Initiate/Maintain alarm  - Obtain necessary fall risk management equipment:   - Apply yellow socks and bracelet for high fall risk patients  - Consider moving patient to room near nurses station  Outcome: Progressing     Problem: PAIN - ADULT  Goal: Verbalizes/displays adequate comfort level or baseline comfort level  Description: Interventions:  - Encourage patient to monitor pain and request assistance  - Assess pain using appropriate pain scale  - Administer analgesics based on type and severity of pain and evaluate response  - Implement non-pharmacological measures as appropriate and evaluate response  - Consider cultural and social influences on pain and pain management  - Notify physician/advanced practitioner if interventions unsuccessful or patient reports new pain  Outcome: Progressing     Problem: INFECTION - ADULT  Goal: Absence or prevention of progression during hospitalization  Description: INTERVENTIONS:  - Assess and monitor for signs and symptoms of infection  - Monitor lab/diagnostic results  - Monitor all insertion sites, i e  indwelling lines, tubes, and drains  - Monitor endotracheal if appropriate and nasal secretions for changes in amount and color  - [place] appropriate cooling/warming therapies per order  - Administer medications as ordered  - Instruct and encourage patient and family to use good hand hygiene technique  - Identify and instruct in appropriate isolation precautions for identified infection/condition  Outcome: Progressing     Problem: SAFETY ADULT  Goal: Patient will remain free of falls  Description: INTERVENTIONS:  - Educate patient/family on patient safety including physical limitations  - Instruct patient to call for assistance with activity   - Consult OT/PT to assist with strengthening/mobility   - Keep Call bell within reach  - Keep bed low and locked with side rails adjusted as appropriate  - Keep care items and personal belongings within reach  - Initiate and maintain comfort rounds  - Make Fall Risk Sign visible to staff  - Offer Toileting every Hours, in advance of need  - Initiate/Maintain larm  - Obtain necessary fall risk management equipment:   - Apply yellow socks and bracelet for high fall risk patients  - Consider moving patient to room near nurses station  Outcome: Progressing  Goal: Maintain or return to baseline ADL function  Description: INTERVENTIONS:  -  Assess patient's ability to carry out ADLs; assess patient's baseline for ADL function and identify physical deficits which impact ability to perform ADLs (bathing, care of mouth/teeth, toileting, grooming, dressing, etc )  - Assess/evaluate cause of self-care deficits   - Assess range of motion  - Assess patient's mobility; develop plan if impaired  - Assess patient's need for assistive devices and provide as appropriate  - Encourage maximum independence but intervene and supervise when necessary  - Involve family in performance of ADLs  - Assess for home care needs following discharge   - Consider OT consult to assist with ADL evaluation and planning for discharge  - Provide patient education as appropriate  Outcome: Progressing  Goal: Maintains/Returns to pre admission functional level  Description: INTERVENTIONS:  - Perform BMAT or MOVE assessment daily    - Set and communicate daily mobility goal to care team and patient/family/caregiver  - Collaborate with rehabilitation services on mobility goals if consulted  - Perform Range of Motion  times a day  - Reposition patient every  hours  - Dangle patient  times a day  - Stand patient  times a day  - Ambulate patient  times a day  - Out of bed to chair  times a day   - Out of bed for mealstimes a day  - Out of bed for toileting  - Record patient progress and toleration of activity level   Outcome: Progressing     Problem: DISCHARGE PLANNING  Goal: Discharge to home or other facility with appropriate resources  Description: INTERVENTIONS:  - Identify barriers to discharge w/patient and caregiver  - Arrange for needed discharge resources and transportation as appropriate  - Identify discharge learning needs (meds, wound care, etc )  - Arrange for interpretive services to assist at discharge as needed  - Refer to Case Management Department for coordinating discharge planning if the patient needs post-hospital services based on physician/advanced practitioner order or complex needs related to functional status, cognitive ability, or social support system  Outcome: Progressing     Problem: Knowledge Deficit  Goal: Patient/family/caregiver demonstrates understanding of disease process, treatment plan, medications, and discharge instructions  Description: Complete learning assessment and assess knowledge base    Interventions:  - Provide teaching at level of understanding  - Provide teaching via preferred learning methods  Outcome: Progressing

## 2022-08-09 NOTE — ASSESSMENT & PLAN NOTE
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

## 2022-08-09 NOTE — UTILIZATION REVIEW
Initial Clinical Review    Admission: Date/Time/Statement:   Admission Orders (From admission, onward)     Ordered        08/08/22 2013  INPATIENT ADMISSION  Once                      Orders Placed This Encounter   Procedures    INPATIENT ADMISSION     Standing Status:   Standing     Number of Occurrences:   1     Order Specific Question:   Level of Care     Answer:   Med Surg [16]     Order Specific Question:   Estimated length of stay     Answer:   More than 2 Midnights     Order Specific Question:   Certification     Answer:   I certify that inpatient services are medically necessary for this patient for a duration of greater than two midnights  See H&P and MD Progress Notes for additional information about the patient's course of treatment  ED Arrival Information     Expected   -    Arrival   8/8/2022 16:59    Acuity   Urgent            Means of arrival   Walk-In    Escorted by   Spouse    Service   Hospitalist    Admission type   Urgent            Arrival complaint   weakness, shortness of breath           Chief Complaint   Patient presents with    Medical Problem     States feeling "unwell" for the past 3 weeks  Wife states " He is gets very grey"  C/o dizzy, weakness, SOB  Denies CP at this time  Initial Presentation: 80 y o  male presents to the ED from home with c/o 3 episodes of near syncope in past 2 wks and SHOEMAKER  On day of presentation pt had such an episode while outside working  Pt has chronic BLE edema at baseline  PMH: prediabetes, hypertension, hyperlipidemia, BPH, recent viral illness for temperature 105F with resolution  In the ED imaging is negative for acute disease  ECG shows sinus bradycardia with 1st degree AV block  He is treated with IV NSS  Labs show Leukcystosis, elevated creat, normal troponins, elevated alk phos, proBNP > 2200  On exam he has mild abd distention, BLE pitting edema, bradycardia    He is admitted to INPATIENT status with alisa Penaloza Metoprolol, antihypertensives, cardio consult, NPO p MN, Echo, D dimer, orthostatics  JOSE LUIS - UA, CTAP, monitor for urinary retention, Nephrology consult  Enlarged prostate - CT imaging  Normocytic amenia - iron studies, B12, folate  Date: 8/9/22   Day 2:   No further syncope, feels improved with hydration  All BP meds on hold for now and will readjust at d/c   Echo pending  8/9 Nephrology Consult - pre-renal JOSE LUIS from intravascular volume depletion, bradycardia, hypotension  IV fluids, hold, ARB, HCTZ, avoid nephrotoxins, urine protein to creat ratio, urine alb: creat ration  8/9 Cardio Consult - SHOEMAKER, JOSE LUIS, lightheadedness, bradycardia, anemia, HTN, dyslipidemia - lightheadedness c/w orthostasis, creat improving with IV fluids and will continue  JOSE LUIS and volume depletion d/t diuretic use with HCTZ and Aldactone and recent infection  Holding antihypertensives, BB, will restart Toprol XL 25 mg BID  Continue Tele, Echo pending        ED Triage Vitals   Temperature Pulse Respirations Blood Pressure SpO2   08/08/22 1750 08/08/22 1748 08/08/22 1748 08/08/22 1748 08/08/22 1748   98 3 °F (36 8 °C) 57 16 104/56 97 %      Temp Source Heart Rate Source Patient Position - Orthostatic VS BP Location FiO2 (%)   08/08/22 1748 08/08/22 1748 08/08/22 1748 08/08/22 1748 --   Oral Monitor Sitting Left arm       Pain Score       08/1950       No Pain          Wt Readings from Last 1 Encounters:   08/08/22 110 kg (242 lb 1 oz)     Additional Vital Signs:   08/09/22 0747 98 2 °F (36 8 °C) 61 18 114/54 78 96 % None (Room air) Sitting   08/09/22 0342 97 6 °F (36 4 °C) 59 18 106/51 74 95 % -- Lying   08/08/22 2329 98 5 °F (36 9 °C) 55 18 106/53 75 96 % -- Lying   08/08/22 2144 98 8 °F (37 1 °C) 58 18 124/57 82 97 % None (Room air) Lying   08/08/22 2100 -- 54 Abnormal  20 114/55 78 97 % None (Room air) Lying   08/08/22 2030 -- 56 16 118/56 81 97 % None (Room air) Lying   08/1950 -- 55 18 117/57 -- 98 % None (Room air) Lying     Pertinent Labs/Diagnostic Test Results:     8/8 ECG - Sinus bradycardia with 1st degree A-V block  Otherwise normal ECG    8/8 Echo - P    CT abdomen pelvis wo contrast   Final Result by Victor Manuel Rucker MD (08/09 8296)      No evidence of acute intra-abdominal or pelvic pathology            Workstation performed: [id number]         XR chest 1 view portable   Final Result by Madelin Gilbert MD (08/09 2155)      No acute cardiopulmonary disease                    Workstation performed: [id number]               Results from last 7 days   Lab Units 08/09/22  0540 08/08/22  1904 08/06/22  0957   WBC Thousand/uL 8 84 12 13* 10 19*   HEMOGLOBIN g/dL 9 9* 10 3* 11 4*   HEMATOCRIT % 31 1* 31 6* 35 2*   PLATELETS Thousands/uL 290 319 316   NEUTROS ABS Thousands/µL 5 98 9 10* 7 95*         Results from last 7 days   Lab Units 08/09/22  0540 08/08/22  1904 08/06/22  0957   SODIUM mmol/L 139 137 134*   POTASSIUM mmol/L 4 1 4 3 4 7   CHLORIDE mmol/L 104 101 104   CO2 mmol/L 24 25 26   ANION GAP mmol/L 11 11 4   BUN mg/dL 32* 31* 29*   CREATININE mg/dL 1 78* 2 24* 1 57*   EGFR ml/min/1 73sq m 37 28 44   CALCIUM mg/dL 8 5 9 0 9 4   MAGNESIUM mg/dL 2 1  --   --      Results from last 7 days   Lab Units 08/09/22  0540 08/08/22  1904 08/06/22  0957   AST U/L 15 15 22   ALT U/L 22 26 30   ALK PHOS U/L 107 125* 133*   TOTAL PROTEIN g/dL 7 0 7 9 8 0   ALBUMIN g/dL 2 9* 3 4* 3 4*   TOTAL BILIRUBIN mg/dL 0 31 0 35 0 63   BILIRUBIN DIRECT mg/dL  --  0 14  --          Results from last 7 days   Lab Units 08/09/22  0540 08/08/22  1904   GLUCOSE RANDOM mg/dL 133 120         Results from last 7 days   Lab Units 08/06/22  0957   HEMOGLOBIN A1C % 6 5*   EAG mg/dl 140     Results from last 7 days   Lab Units 08/08/22  2317 08/08/22  2109 08/08/22  1904   HS TNI 0HR ng/L  --   --  6   HS TNI 2HR ng/L  --  6  --    HSTNI D2 ng/L  --  0  --    HS TNI 4HR ng/L 7  --   --    HSTNI D4 ng/L 1  --   --      Results from last 7 days   Lab Units 08/08/22  2317   D-DIMER QUANTITATIVE ug/ml FEU 0 41     Results from last 7 days   Lab Units 08/09/22  0540   PROTIME seconds 14 9*   INR  1 17   PTT seconds 39*     Results from last 7 days   Lab Units 08/08/22  1904 08/06/22  0957   TSH 3RD GENERATON uIU/mL 1 724 1 410         Results from last 7 days   Lab Units 08/08/22  1904   NT-PRO BNP pg/mL 2,211*     Results from last 7 days   Lab Units 08/09/22  0716 08/09/22  0713 08/09/22  0700   CLARITY UA  Slightly Cloudy  --   --    COLOR UA  Yellow  --   --    SPEC GRAV UA  1 025  --   --    PH UA  5 5  --   --    GLUCOSE UA mg/dl Negative  --   --    KETONES UA mg/dl Negative  --   --    BLOOD UA  Negative  --   --    PROTEIN UA mg/dl Negative  --   --    NITRITE UA  Negative  --   --    BILIRUBIN UA  Negative  --   --    UROBILINOGEN UA E U /dl 0 2  --   --    LEUKOCYTES UA  Negative  --   --    SODIUM UR   --  105  --    CREATININE UR mg/dL  --   --  139 5     ED Treatment:   Medication Administration from 08/08/2022 1659 to 08/08/2022 2144       Date/Time Order Dose Route Action     08/08/2022 1956 sodium chloride 0 9 % bolus 500 mL 500 mL Intravenous New Bag        Past Medical History:   Diagnosis Date    Exposure to hepatitis     treated with medication    Hearing loss of aging     History of blood transfusion     as a child    History of total left knee replacement     HL (hearing loss)     Hx of exposure to tuberculosis     treated in past with meds    Hyperlipidemia     Hypertension     OA (osteoarthritis)     bea  knees    Ocular hypertension of left eye     Tinnitus     Use of cane as ambulatory aid     Wears glasses      Admitting Diagnosis: Shortness of breath [R06 02]  Dyspnea on exertion [R06 00]  JOSE LUIS (acute kidney injury) ([facility] [address] ) [N17 9]  Elevated brain natriuretic peptide (BNP) level [R79 89]  Age/Sex: 80 y o  male  Admission Orders:  Scheduled Medications:  heparin (porcine), 5,000 Units, Subcutaneous, Q8H [facility]      Continuous IV Infusions:     PRN Meds:  acetaminophen, 650 mg, Oral, Q6H PRN    Tele  NPO w/ sips  Ferritin, Iron sat, Lyme, microalbumin/creat urine ratio,   IP CONSULT TO CARDIOLOGY  IP CONSULT TO NEPHROLOGY    Network Utilization Review Department  ATTENTION: Please call with any questions or concerns to [phone] and carefully listen to the prompts so that you are directed to the right person  All voicemails are confidential   [facility] all requests for admission clinical reviews, approved or denied determinations and any other requests to dedicated fax number below belonging to the campus where the patient is receiving treatment   List of dedicated fax numbers for the Facilities:  [address] DENIALS (Administrative/Medical Necessity) [phone]   [address] (Maternity/NICU/Pediatrics) [phone]   [address]  [address] [facility] [place] [address] [address] [place] [address] [address] [facility] [place] [address] [phone]

## 2022-08-09 NOTE — CONSULTS
Cardiology Consultation  MD Everardo Finch MD Susette Andes, DO, MD Karla Hernandez DO, Kiana Zuñiga DO, [facility] [place]  ----------------------------------------------------------------  [address] [place] [address]    Roberto Whittington 80 y o  male MRN: [id number]  Unit/Bed#: E4 -01 Encounter: [id number]      Reason for Consultation:  Dyspnea on exertion      ASSESSMENT:    Dyspnea on exertion   Acute kidney injury   Lightheadedness   Bradycardia   Anemia   Hypertension   Dyslipidemia   Pre diabetes   Severe obesity   History of BPH    PLAN:  Patient has been having episodes of lightheadedness that are most consistent with orthostasis  Creatinine improving with IV fluids  Continue with IV fluids as per primary service  Acute kidney injury and volume depletion likely related to a combination diuretic use with hydrochlorothiazide and spironolactone as well as recent infection  Continue to hold antihypertensive regimen as patient is getting volume repleted  Hold beta-blocker for now and would likely reinstitute at 25 mg b i d  of Toprol-XL  Further antihypertensive medication is required if the patient is improved, can consider increasing amlodipine  TSH WNL  Check 2D echocardiogram to assess cardiac structure and function  Continue to monitor on telemetry    Signed: Geeta Ventura DO, FACC, KENRICK, TUSHAR      History of Present Illness:  Roberto Whittington is a 80 y o  male with hypertension, dyslipidemia pre diabetes, obesity and BPH presented with progressive lightheadedness over 2 weeks  Patient had significant shortness of breath with dyspnea on exertion  The patient never had any loss of consciousness  The patient has episodes of lightheadedness occurred with physical activity, but were not associated with any chest discomfort    He would have episodes that were severe intensity in after multiple episodes, he presented to [address] [place] [address] [employer] Cely SinghNye Catia for evaluation  Labs demonstrated acute kidney injury with elevated NT proBNP  Chest x-ray was found be clear  The patient was given IV fluids with improvement of creatinine  We have been asked to see the patient regarding dyspnea and lightheadedness  The patient has no personal history of cardiovascular disease of which she is aware  Denies chest pain, pressure, tightness or squeezing  Denies palpitations  Denies lower extremity swelling, orthopnea or paroxysmal nocturnal dyspnea  He is extremely active on a regular basis without exertional symptoms  Review of Systems:  Review of Systems   Constitutional: Negative for decreased appetite, fever, weight gain and weight loss  HENT: Negative for congestion and sore throat  Eyes: Negative for visual disturbance  Cardiovascular: Positive for dyspnea on exertion  Negative for chest pain, leg swelling, near-syncope and palpitations  Respiratory: Positive for shortness of breath  Negative for cough  Hematologic/Lymphatic: Negative for bleeding problem  Skin: Negative for rash  Musculoskeletal: Negative for myalgias and neck pain  Gastrointestinal: Negative for abdominal pain and nausea  Neurological: Positive for light-headedness  Negative for weakness  Psychiatric/Behavioral: Negative for depression           Past Medical History:   Diagnosis Date    Exposure to hepatitis     treated with medication    Hearing loss of aging     History of blood transfusion     as a child    History of total left knee replacement     HL (hearing loss)     Hx of exposure to tuberculosis     treated in past with meds    Hyperlipidemia     Hypertension     OA (osteoarthritis)     bea  knees    Ocular hypertension of left eye     Tinnitus     Use of cane as ambulatory aid     Wears glasses        Past Surgical History:   Procedure Laterality Date    ADENOIDECTOMY      CATARACT EXTRACTION Bilateral     COLONOSCOPY      JOINT REPLACEMENT Left     knee    ND TOTAL KNEE ARTHROPLASTY Left 2017    Procedure: ARTHROPLASTY KNEE TOTAL;  Surgeon: Naresh Eastman MD;  [facility];  Service: Orthopedics    ND TOTAL KNEE ARTHROPLASTY Right 2017    Procedure: ARTHROPLASTY KNEE TOTAL;  Surgeon: Naresh Eastman MD;  [facility];  Service: Orthopedics    TONSILLECTOMY      WISDOM TOOTH EXTRACTION         Social History     Socioeconomic History    Marital status: /Civil Union     Spouse name: None    Number of children: None    Years of education: None    Highest education level: None   Occupational History    None   Tobacco Use    Smoking status: Former Smoker     Quit date: 1972     Years since quittin 1    Smokeless tobacco: Never Used    Tobacco comment: per All[employer]:  Never a smoker   Vaping Use    Vaping Use: Never used   Substance and Sexual Activity    Alcohol use: No    Drug use: No    Sexual activity: None   Other Topics Concern    None   Social History Narrative    None     Social Determinants of Health     Financial Resource Strain: Not on file   Food Insecurity: Not on file   Transportation Needs: Not on file   Physical Activity: Not on file   Stress: Not on file   Social Connections: Not on file   Intimate Partner Violence: Not on file   Housing Stability: Not on file       Family History   Problem Relation Age of Onset    Diabetes Mother     COPD Father     Cancer Father         Gastric    Cancer Paternal Uncle         Gastric    Breast cancer Family     Thyroid cancer Family        Allergies   Allergen Reactions    Celebrex [Celecoxib] GI Intolerance     Nausea - pt able to tolerate with Prevacid    Influenza Vaccines        "I get the flu or feel horrible for months"       No current facility-administered medications on file prior to encounter       Current Outpatient Medications on File Prior to Encounter   Medication Sig    amLODIPine (NORVASC) 5 mg tablet Take 1 tablet (5 mg total) by mouth daily    Ascorbic Acid (vitamin C) 1000 MG tablet Take 1,000 mg by mouth daily    cholecalciferol (VITAMIN D3) 1,000 units tablet Take 1,000 Units by mouth daily    hydrochlorothiazide (HYDRODIURIL) 25 mg tablet Take 1 tablet (25 mg total) by mouth daily    losartan (COZAAR) 100 MG tablet Take 1 tablet (100 mg total) by mouth daily    metoprolol succinate (TOPROL-XL) 50 mg 24 hr tablet Take 1 tablet (50 mg total) by mouth daily    Multiple Vitamin (MULTI-VITAMIN DAILY PO) Take 1 tablet by mouth daily    ROCKLATAN 0 02-0 005 % SOLN     spironolactone (ALDACTONE) 25 mg tablet Take 1 tablet (25 mg total) by mouth daily    tamsulosin (FLOMAX) 0 4 mg Take 1 capsule (0 4 mg total) by mouth daily at bedtime    zinc gluconate 50 mg tablet Take 50 mg by mouth daily (Patient not taking: No sig reported)        Current Facility-Administered Medications   Medication Dose Route Frequency Provider Last Rate    acetaminophen  650 mg Oral Q6H PRN Ely Chang DO      heparin (porcine)  5,000 Units Subcutaneous [facility] Ely Chang DO              Vitals:    08/08/22 2144 08/08/22 2329 08/09/22 0342 08/09/22 0747   BP: 124/57 106/53 106/51 114/54   BP Location: Right arm Left arm Left arm Left arm   Pulse: 58 55 59 61   Resp: 18 18 18 18   Temp: 98 8 °F (37 1 °C) 98 5 °F (36 9 °C) 97 6 °F (36 4 °C) 98 2 °F (36 8 °C)   TempSrc: Temporal Temporal Temporal Temporal   SpO2: 97% 96% 95% 96%   Weight: 110 kg (242 lb 1 oz)      Height: 5' 8" (1 727 m)          I/O last 3 completed shifts:   In: 1092 [P O :240; IV Piggyback:5000]  Out: 250 [Urine:250]      Intake/Output Summary (Last 24 hours) at 8/9/2022 1047  Last data filed at 8/9/2022 0801  Gross per 24 hour   Intake 5240 ml   Output 250 ml   Net 4990 ml       Weight change:     PHYSICAL EXAMINATION:  Gen: Awake, Alert, NAD  Head/eyes: AT/NC, pupils equal and round, Anicteric  ENT: mmm  Neck: Supple, No elevated JVP, trachea midline  Resp: CTA bilaterally no w/r/r  CV: RRR +S1, S2, No m/r/g  Abd: Soft, obese, NT/ND + BS  Ext: no LE edema bilaterally  Neuro: Follows commands, moves all extermities  Psych: Appropriate affect, normal mood, pleasant attitude, non-combative  Skin: warm; no rash, erythema or venous stasis changes on exposed skin    Lab Results:  Results from last 7 days   Lab Units 08/09/22  0540   WBC Thousand/uL 8 84   HEMOGLOBIN g/dL 9 9*   HEMATOCRIT % 31 1*   PLATELETS Thousands/uL 290     Results from last 7 days   Lab Units 08/09/22  0540   POTASSIUM mmol/L 4 1   CHLORIDE mmol/L 104   CO2 mmol/L 24   BUN mg/dL 32*   CREATININE mg/dL 1 78*   CALCIUM mg/dL 8 5   ALK PHOS U/L 107   ALT U/L 22   AST U/L 15     No results found for: TROPONINT      Results from last 7 days   Lab Units 08/08/22  2317 08/08/22  2109 08/08/22  1904   HS TNI 0HR ng/L  --   --  6   HS TNI 2HR ng/L  --  6  --    HS TNI 4HR ng/L 7  --   --          Results from last 7 days   Lab Units 08/09/22  0540   INR  1 17           No results found for this or any previous visit  No results found for this or any previous visit  No results found for this or any previous visit  No results found for this or any previous visit  CXR: Results for orders placed in visit on 03/21/20    XR chest pa & lateral    Narrative  CHEST    INDICATION:   R05: Cough  COMPARISON:  June 20, 2017  EXAM PERFORMED/VIEWS:  XR CHEST PA & LATERAL      FINDINGS:    Cardiomediastinal silhouette appears unremarkable  Mild perihilar peribronchial wall thickening, suggestive of bronchitis / airway disease  No focal consolidation, pleural effusion or pneumothorax  Age-appropriate degenerative changes are noted in the spine  Impression  Mild perihilar peribronchial wall thickening, suggestive of bronchitis / airway disease  No focal consolidation, pleural effusion or pneumothorax          Workstation performed: [id number]    Results for orders placed during the hospital encounter of 08/08/22    XR chest 1 view portable    Narrative  CHEST    INDICATION:   shortness of breath  COMPARISON:  3/21/2020  EXAM PERFORMED/VIEWS:  XR CHEST PORTABLE      FINDINGS:    Cardiomediastinal silhouette appears unremarkable  The lungs are clear  No pneumothorax or pleural effusion  Osseous structures appear within normal limits for patient age  Impression  No acute cardiopulmonary disease  Workstation performed: [id number]      ECG:  Sinus rhythm, first-degree AV block, otherwise normal ECG    This note was completed in part utilizing [employer] Software  Grammatical errors, random word insertions, spelling mistakes, and incomplete sentences may be an occasional consequence of this system secondary to software limitations, ambient noise, and hardware issues  If you have any questions or concerns about the content, text, or information contained within the body of this dictation, please contact the provider for clarification

## 2022-08-09 NOTE — ASSESSMENT & PLAN NOTE
History of prostatomegaly for which he has seen Urology in the past and they had at 1 point recommended a prostate surgery  Plan  Prostate enlarged on CT scan  Recommend outpt urology f/u

## 2022-08-09 NOTE — CONSULTS
[facility] [address] 80 y o  male MRN: [id number]  Unit/Bed#: E4 -01 Encounter: [id number]    ASSESSMENT and PLAN:  Verlan Fleischer is a 80 y o  male who was admitted to [facility] after presenting with presyncope  A renal consultation is requested today for assistance in the management of acute kidney injury  Acute kidney injury on top of chronic kidney disease stage III  - Etiology of acute kidney injury: Pre renal JOSE LUIS in setting of decreased renal perfusion from intravascular volume depletion, bradycardia and hypotension  - Etiology of chronic kidney disease: Longstanding history of hypertension leading to hypertensive nephrosclerosis, prediabetes, obstructive uropathy from BPH, age-related nephron loss  - Cr at baseline: Appears to be around 1 3-1 5 range  - Cr at presentation: 2 24, Cr at consult: 1 78  - UA: No protein, no blood, no leukocytes  - Urine sodium more than 100 after receiving normal saline  - Renal imaging: No hydronephrosis, no urinary retention    PLAN  - He is currently hypotensive as his blood pressure at home usually runs higher (130's-140's) on 4 antihypertensive medications including a diuretic  - Cardiac BNP is elevated probably in setting of acute kidney injury but he probably has intravascular volume depletion based on clinical examination and chest imaging   - Give IVF at 75 cc/hour for another 500 cc today  - Continue to hold hydrochlorothiazide and angiotensin receptor blocker  - Avoid nephrotoxins, adjust meds to appropriate GFR  - Strict I/O and daily weights  - Trend BMP daily   - Check urine protein to creatinine ratio and urine albumin to creatinine ratio     BP/Volume   - Home medications: Amlodipine 5 mg daily, Losartan 100 mg daily, Hydrochlorothiazide 25 mg daily, Aldactone 25 mg daily and Metoprolol 50 mg daily  - Current medications:   All antihypertensive medications are currently on hold  - He has mild lower extremity edema which is chronic  - Chest x-ray with no evidence of pulmonary edema  - Cardiac BNP is elevated probably in setting of acute kidney injury but he probably has intravascular volume depletion based on clinical examination and chest imaging   - Continue to hold hydrochlorothiazide and angiotensin receptor blocker and give another 500 cc of IVF    Normocytic anemia   - Baseline hemoglobin around 10-12  - Agree with checking iron studies including ferritin to rule out iron deficiency anemia    Electrolytes/Acid Base Status   - Stable     Bone Mineral Disease   - Goal Ca 8 5-10 mg/dL, goal Phos 2 7-4 6 mg/dL, goal iPTH 30-70 pg/mL  - Check I-PTH as outpatient     Presyncope/bradycardia  - Evaluation and management per cardiology    SUMMARY OF RECOMMENDATIONS:  - Give IVF at 76 cc/hour for another 500 cc today  - Continue to hold antihypertensive medications including hydrochlorothiazide and angiotensin receptor blocker    HISTORY OF PRESENT ILLNESS:  Requesting Physician: Dionicio Kunz MD  Reason for Consult:  Acute kidney injury    Rodney Sargent is a 80 y o  male who was admitted to [facility] after presenting with presyncope  A renal consultation is requested today for assistance in the management of acute kidney injury  He has history of hypertension for more than 30 years and is currently on several medications for hypertension including amlodipine, losartan, hydrochlorothiazide and Aldactone  He also has a history of BPH and is on Flomax  His mother has chronic kidney disease  Nobody in the family is on dialysis  He does not take NSAIDs  He denies any history of heart disease or heart failure  He has not seen a nephrologist before  Yesterday he was working and felt episodes of dizziness but did not lose consciousness  In the ER he was noted to have bradycardia and hypotension  He was given 500 cc of normal saline  Currently appears well  Denies chest pain or shortness of breath    Denies abdominal pain     PAST MEDICAL HISTORY:  Past Medical History:   Diagnosis Date    Exposure to hepatitis     treated with medication    Hearing loss of aging     History of blood transfusion     as a child    History of total left knee replacement     HL (hearing loss)     Hx of exposure to tuberculosis     treated in past with meds    Hyperlipidemia     Hypertension     OA (osteoarthritis)     bea  knees    Ocular hypertension of left eye     Tinnitus     Use of cane as ambulatory aid     Wears glasses        PAST SURGICAL HISTORY:  Past Surgical History:   Procedure Laterality Date    ADENOIDECTOMY      CATARACT EXTRACTION Bilateral     COLONOSCOPY      JOINT REPLACEMENT Left     knee    DE TOTAL KNEE ARTHROPLASTY Left 2017    Procedure: ARTHROPLASTY KNEE TOTAL;  Surgeon: Kelley Maya MD;  [facility];  Service: Orthopedics    DE TOTAL KNEE ARTHROPLASTY Right 2017    Procedure: ARTHROPLASTY KNEE TOTAL;  Surgeon: Kelley Maya MD;  [facility];  Service: Orthopedics    TONSILLECTOMY      WISDOM TOOTH EXTRACTION         ALLERGIES:  Allergies   Allergen Reactions    Celebrex [Celecoxib] GI Intolerance     Nausea - pt able to tolerate with Prevacid    Influenza Vaccines        "I get the flu or feel horrible for months"       SOCIAL HISTORY:  Social History     Substance and Sexual Activity   Alcohol Use No     Social History     Substance and Sexual Activity   Drug Use No     Social History     Tobacco Use   Smoking Status Former Smoker    Quit date: 1972    Years since quittin 1   Smokeless Tobacco Never Used   Tobacco Comment    per Allscripts:  Never a smoker       FAMILY HISTORY:  Family History   Problem Relation Age of Onset    Diabetes Mother     COPD Father     Cancer Father         Gastric    Cancer Paternal Uncle         Gastric    Breast cancer Family     Thyroid cancer Family        MEDICATIONS:    Current Facility-Administered Medications:    acetaminophen (TYLENOL) tablet 650 mg, 650 mg, Oral, Q6H PRN, Li Kilpatrick DO    heparin (porcine) subcutaneous injection 5,000 Units, 5,000 Units, Subcutaneous, Q8H [facility], Li Kilpatrick DO, 5,000 Units at 08/09/22 0519    multi-electrolyte (PLASMALYTE-A/ISOLYTE-S PH 7 4) IV solution, 75 mL/hr, Intravenous, Once, Fátima Jacques MD    REVIEW OF SYSTEMS:  Review of Systems   Constitutional: Negative for chills and fever  HENT: Negative for ear pain and sore throat  Eyes: Negative for pain and visual disturbance  Respiratory: Negative for cough and shortness of breath  Cardiovascular: Positive for leg swelling  Negative for chest pain and palpitations  Gastrointestinal: Negative for abdominal pain and vomiting  Genitourinary: Negative for dysuria and hematuria  Musculoskeletal: Negative for arthralgias and back pain  Skin: Negative for color change and rash  Neurological: Positive for dizziness  Negative for seizures and syncope  All other systems reviewed and are negative  PHYSICAL EXAM:  Current Weight: Weight - Scale: 110 kg (242 lb 1 oz)  First Weight: Weight - Scale: 110 kg (242 lb 4 6 oz)  Vitals:    08/08/22 2144 08/08/22 2329 08/09/22 0342 08/09/22 0747   BP: 124/57 106/53 106/51 114/54   BP Location: Right arm Left arm Left arm Left arm   Pulse: 58 55 59 61   Resp: 18 18 18 18   Temp: 98 8 °F (37 1 °C) 98 5 °F (36 9 °C) 97 6 °F (36 4 °C) 98 2 °F (36 8 °C)   TempSrc: Temporal Temporal Temporal Temporal   SpO2: 97% 96% 95% 96%   Weight: 110 kg (242 lb 1 oz)      Height: 5' 8" (1 727 m)          Intake/Output Summary (Last 24 hours) at 8/9/2022 0941  Last data filed at 8/9/2022 0801  Gross per 24 hour   Intake 5240 ml   Output 250 ml   Net 4990 ml     Physical Exam  Constitutional:       Appearance: Normal appearance  HENT:      Head: Normocephalic and atraumatic  Mouth/Throat:      Mouth: Mucous membranes are moist       Pharynx: Oropharynx is clear     Neck:      Comments: No JVD  Cardiovascular:      Rate and Rhythm: Normal rate and regular rhythm  Pulmonary:      Effort: Pulmonary effort is normal       Breath sounds: Normal breath sounds  Abdominal:      General: Bowel sounds are normal       Palpations: Abdomen is soft  Musculoskeletal:         General: Normal range of motion  Right lower leg: Edema present  Left lower leg: Edema present  Skin:     General: Skin is warm and dry  Neurological:      General: No focal deficit present  Mental Status: He is alert and oriented to person, place, and time  Mental status is at baseline  Psychiatric:         Mood and Affect: Mood normal          Behavior: Behavior normal          Thought Content: Thought content normal          Judgment: Judgment normal        Lab Results:   Results from last 7 days   Lab Units 08/09/22  0540 08/08/22  1904 08/06/22  0957   WBC Thousand/uL 8 84 12 13* 10 19*   HEMOGLOBIN g/dL 9 9* 10 3* 11 4*   HEMATOCRIT % 31 1* 31 6* 35 2*   PLATELETS Thousands/uL 290 319 316   POTASSIUM mmol/L 4 1 4 3 4 7   CHLORIDE mmol/L 104 101 104   CO2 mmol/L 24 25 26   BUN mg/dL 32* 31* 29*   CREATININE mg/dL 1 78* 2 24* 1 57*   CALCIUM mg/dL 8 5 9 0 9 4   MAGNESIUM mg/dL 2 1  --   --    ALK PHOS U/L 107 125* 133*   ALT U/L 22 26 30   AST U/L 15 15 22     Other Studies: CT abdomen with no evidence of hydronephrosis, chest x-ray with no evidence of pulmonary edema  Portions of the record may have been created with voice recognition software  Occasional wrong word or "sound a like" substitutions may have occurred due to the inherent limitations of voice recognition software  Read the chart carefully and recognize, using context, where substitutions have occurred  If you have any questions, please contact the dictating provider

## 2022-08-09 NOTE — ASSESSMENT & PLAN NOTE
Hemoglobin 10 3 with MCV in the mid 90s  Hemoglobin has been slowly down trending over the past year from 12  Appears to have anemia of chronic dz based on iron studies  No signs of [facility] gi bleeding

## 2022-08-09 NOTE — ASSESSMENT & PLAN NOTE
-3 episodes of near syncope over the past 2 weeks with most recent occurring earlier in the day today while patient was outside exerting himself  Denies any chest pain  -also notes exertional dyspnea worsening over the past 2 weeks  -upon arrival in ER patient noted to be bradycardic  EKG sinus bradycardia, rate 49, first-degree AV block, two second sinus pause, no st elevation  -troponin negative x1  -BNP elevated at 2200  -chest x-ray reviewed and no large effusion or focal infiltrate per my read awaiting official read  -clinically patient appears hypervolemic with bilateral lower extremity pitting edema, abdominal distention  -no echocardiograms in our system were able to be found in care everywhere; patient's medications of a beta-blocker, Arb and Aldactone can be seen if patient has a history of HFrEF although I have no evidence of this in chart and family denies any history of this  -not on any PTA diuretics  -additionally has JOSE LUIS with creatinine 2 2 as well as normocytic anemia with hemoglobin 10 3  Plan  > admit to medicine on telemetry  Trend troponin to ensure no rise per protocol  Patient's EKG as well as near-syncope concerning for symptomatic bradycardia  Hold patient's PTA metoprolol as well as his PTA antihypertensive agents which include amlodipine, Aldactone, losartan and HCTZ  Consult Cardiology  Make NPO after midnight in case need for any possible cardiac procedure in the morning  > with respect to patient's dyspnea on exertion, we are trending troponins to ensure no acute arise and we will have Cardiology evaluate patient to assess whether this could be a anginal equivalent  Additionally, given patient's hypervolemic status, order a 2D echo to assess for possible underlying heart failure  Given patient's JOSE LUIS though, we will hold off of any diuresis overnight  > we will also order a D-dimer    If positive, patient will need bilateral lower extremity venous duplex as well as V/Q scan to definitively rule out PE    > check orthostatics  > JOSE LUIS workup as below  > daily labs

## 2022-08-09 NOTE — ED PROVIDER NOTES
History  Chief Complaint   Patient presents with    Medical Problem     States feeling "unwell" for the past 3 weeks  Wife states " He is gets very grey"  C/o dizzy, weakness, SOB  Denies CP at this time  79 YO male presents with dyspnea on exertion and lightheadedness for the last 3 weeks  States this has been mostly intermittent and occasionally worse, sometimes improved  He notes he is generally active, biking and working in the yard  Pt states this morning seemed to be worse, he was getting increasingly shortness of breath with very little exertion  He denies chest pain  Pt had recent bloodwork which demonstrated an elevated creatinine, 1 57  Pt denies any rashes, no fevers  Pt denies CP/SOB/F/C/N/V/D/C, no dysuria, burning on urination or blood in urine  History provided by:  Patient and spouse   used: No    Shortness of Breath  Severity:  Moderate  Onset quality:  Gradual  Duration:  3 weeks  Timing:  Intermittent  Progression:  Waxing and waning  Chronicity:  New  Context: activity    Relieved by:  Nothing  Worsened by:  Exertion  Ineffective treatments:  None tried  Associated symptoms: no abdominal pain, no chest pain, no cough, no fever, no neck pain, no rash and no vomiting        Prior to Admission Medications   Prescriptions Last Dose Informant Patient Reported? Taking?    Ascorbic Acid (vitamin C) 1000 MG tablet 8/8/2022 at Unknown time Self Yes Yes   Sig: Take 1,000 mg by mouth daily   Multiple Vitamin (MULTI-VITAMIN DAILY PO) 8/8/2022 at Unknown time Self Yes Yes   Sig: Take 1 tablet by mouth daily   ROCKLATAN 0 02-0 005 % SOLN 8/8/2022 at Unknown time Self Yes Yes   amLODIPine (NORVASC) 5 mg tablet 8/8/2022 at Unknown time  No Yes   Sig: Take 1 tablet (5 mg total) by mouth daily   cholecalciferol (VITAMIN D3) 1,000 units tablet 8/8/2022 at Unknown time Self Yes Yes   Sig: Take 1,000 Units by mouth daily   hydrochlorothiazide (HYDRODIURIL) 25 mg tablet 8/8/2022 at Unknown time  No Yes   Sig: Take 1 tablet (25 mg total) by mouth daily   losartan (COZAAR) 100 MG tablet 8/8/2022 at Unknown time  No Yes   Sig: Take 1 tablet (100 mg total) by mouth daily   metoprolol succinate (TOPROL-XL) 50 mg 24 hr tablet 8/8/2022 at Unknown time  No Yes   Sig: Take 1 tablet (50 mg total) by mouth daily   spironolactone (ALDACTONE) 25 mg tablet 8/8/2022 at Unknown time  No Yes   Sig: Take 1 tablet (25 mg total) by mouth daily   tamsulosin (FLOMAX) 0 4 mg 8/8/2022 at Unknown time  No Yes   Sig: Take 1 capsule (0 4 mg total) by mouth daily at bedtime   zinc gluconate 50 mg tablet Not Taking at Unknown time Self Yes No   Sig: Take 50 mg by mouth daily   Patient not taking: No sig reported      Facility-Administered Medications: None       Past Medical History:   Diagnosis Date    Exposure to hepatitis     treated with medication    Hearing loss of aging     History of blood transfusion     as a child    History of total left knee replacement     HL (hearing loss)     Hx of exposure to tuberculosis     treated in past with meds    Hyperlipidemia     Hypertension     OA (osteoarthritis)     bea  knees    Ocular hypertension of left eye     Tinnitus     Use of cane as ambulatory aid     Wears glasses        Past Surgical History:   Procedure Laterality Date    ADENOIDECTOMY      CATARACT EXTRACTION Bilateral     COLONOSCOPY      JOINT REPLACEMENT Left     knee    [facility] TOTAL KNEE ARTHROPLASTY Left 7/14/2017    Procedure: ARTHROPLASTY KNEE TOTAL;  Surgeon: Clarence Aldana MD;  Location: [facility];  Service: Orthopedics    [facility] TOTAL KNEE ARTHROPLASTY Right 9/5/2017    Procedure: ARTHROPLASTY KNEE TOTAL;  Surgeon: Clarence Aldana MD;  Location: [facility];  Service: Orthopedics    TONSILLECTOMY      WISDOM TOOTH EXTRACTION         Family History   Problem Relation Age of Onset    Diabetes Mother     COPD Father     Cancer Father         Gastric    Cancer Paternal Uncle         Gastric  Breast cancer Family     Thyroid cancer Family      I have reviewed and agree with the history as documented  E-Cigarette/Vaping    E-Cigarette Use Never User      E-Cigarette/Vaping Substances    Nicotine No     THC No     CBD No     Flavoring No     Other No     Unknown No      Social History     Tobacco Use    Smoking status: Former Smoker     Quit date: 1972     Years since quittin 1    Smokeless tobacco: Never Used    Tobacco comment: per [employer]:  Never a smoker   Vaping Use    Vaping Use: Never used   Substance Use Topics    Alcohol use: No    Drug use: No       Review of Systems   Constitutional: Negative for fever  HENT: Negative for dental problem  Eyes: Negative for visual disturbance  Respiratory: Positive for shortness of breath  Negative for cough  Cardiovascular: Negative for chest pain  Gastrointestinal: Negative for abdominal pain, nausea and vomiting  Genitourinary: Negative for dysuria and frequency  Musculoskeletal: Negative for neck pain and neck stiffness  Skin: Negative for rash  Neurological: Negative for dizziness, weakness and light-headedness  Psychiatric/Behavioral: Negative for agitation, behavioral problems and confusion  All other systems reviewed and are negative  Physical Exam  Physical Exam  Vitals and nursing note reviewed  Constitutional:       Appearance: He is well-developed  HENT:      Head: Normocephalic and atraumatic  Eyes:      Extraocular Movements: Extraocular movements intact  Cardiovascular:      Rate and Rhythm: Bradycardia present  Pulmonary:      Effort: Pulmonary effort is normal       Breath sounds: Normal breath sounds  Abdominal:      General: There is no distension  Musculoskeletal:         General: Normal range of motion  Cervical back: Normal range of motion  Skin:     Findings: No rash  Neurological:      Mental Status: He is alert and oriented to person, place, and time  Psychiatric:         Behavior: Behavior normal          Vital Signs  ED Triage Vitals   Temperature Pulse Respirations Blood Pressure SpO2   08/08/22 1750 08/08/22 1748 08/08/22 1748 08/08/22 1748 08/08/22 1748   98 3 °F (36 8 °C) 57 16 104/56 97 %      Temp Source Heart Rate Source Patient Position - Orthostatic VS BP Location FiO2 (%)   08/08/22 1748 08/08/22 1748 08/08/22 1748 08/08/22 1748 --   Oral Monitor Sitting Left arm       Pain Score       08/1950       No Pain           Vitals:    08/08/22 2030 08/08/22 2100 08/08/22 2144 08/08/22 2329   BP: 118/56 114/55 124/57 106/53   Pulse: 56 (!) 54 58 55   Patient Position - Orthostatic VS: Lying Lying Lying Lying         Visual Acuity      ED Medications  Medications   acetaminophen (TYLENOL) tablet 650 mg (has no administration in time range)   heparin (porcine) subcutaneous injection 5,000 Units (5,000 Units Subcutaneous Given 8/8/22 2308)   sodium chloride 0 9 % bolus 500 mL (0 mL Intravenous Stopped 8/8/22 2134)       Diagnostic Studies  Results Reviewed     Procedure Component Value Units Date/Time    HS Troponin I 4hr [[id number]]  (Normal) Collected: 08/08/22 2317    Lab Status: Final result Specimen: Blood from Arm, Left Updated: 08/08/22 2349     hs TnI 4hr 7 ng/L      Delta 4hr hsTnI 1 ng/L     D-dimer, quantitative [[id number]]  (Normal) Collected: 08/08/22 2317    Lab Status: Final result Specimen: Blood from Arm, Left Updated: 08/08/22 2339     D-Dimer, Quant 0 41 ug/ml FEU     Narrative: In the evaluation for possible pulmonary embolism, in the appropriate (Well's Score of 4 or less) patient, the age adjusted d-dimer cutoff for this patient can be calculated as:    Age x 0 01 (in ug/mL) for Age-adjusted D-dimer exclusion threshold for a patient over 50 years  Joanie Woodall [[id number]] Collected: 08/08/22 2317    Lab Status:  In process Specimen: Blood from Arm, Left Updated: 08/08/22 2326    HS Troponin I 2hr [[id number]]  (Normal) Collected: 08/08/22 2109    Lab Status: Final result Specimen: Blood Updated: 08/08/22 2139     hs TnI 2hr 6 ng/L      Delta 2hr hsTnI 0 ng/L     TSH [[id number]]  (Normal) Collected: 08/08/22 1904    Lab Status: Final result Specimen: Blood from Arm, Right Updated: 08/08/22 1952     TSH 3RD GENERATON 1 724 uIU/mL     Narrative:      Patients undergoing fluorescein dye angiography may retain small amounts of fluorescein in the body for 48-72 hours post procedure  Samples containing fluorescein can produce falsely depressed TSH values  If the patient had this procedure,a specimen should be resubmitted post fluorescein clearance        NT-BNP PRO [[id number]]  (Abnormal) Collected: 08/08/22 1904    Lab Status: Final result Specimen: Blood from Arm, Right Updated: 08/08/22 1952     NT-proBNP 2,211 pg/mL     Hepatic function panel [[id number]]  (Abnormal) Collected: 08/08/22 1904    Lab Status: Final result Specimen: Blood from Arm, Right Updated: 08/08/22 1952     Total Bilirubin 0 35 mg/dL      Bilirubin, Direct 0 14 mg/dL      Alkaline Phosphatase 125 U/L      AST 15 U/L      ALT 26 U/L      Total Protein 7 9 g/dL      Albumin 3 4 g/dL     HS Troponin 0hr (reflex protocol) [[id number]]  (Normal) Collected: 08/08/22 1904    Lab Status: Final result Specimen: Blood from Arm, Right Updated: 08/08/22 1944     hs TnI 0hr 6 ng/L     Basic metabolic panel [[id number]]  (Abnormal) Collected: 08/08/22 1904    Lab Status: Final result Specimen: Blood from Arm, Right Updated: 08/08/22 1941     Sodium 137 mmol/L      Potassium 4 3 mmol/L      Chloride 101 mmol/L      CO2 25 mmol/L      ANION GAP 11 mmol/L      BUN 31 mg/dL      Creatinine 2 24 mg/dL      Glucose 120 mg/dL      Calcium 9 0 mg/dL      eGFR 28 ml/min/1 73sq m     Narrative:      [facility] guidelines for Chronic Kidney Disease (CKD):     Stage 1 with normal or high GFR (GFR > 90 mL/min/1 73 square meters)    Stage 2 Mild CKD (GFR = 60-89 mL/min/1 73 square meters)    Stage 3A Moderate CKD (GFR = 45-59 mL/min/1 73 square meters)    Stage 3B Moderate CKD (GFR = 30-44 mL/min/1 73 square meters)    Stage 4 Severe CKD (GFR = 15-29 mL/min/1 73 square meters)    Stage 5 End Stage CKD (GFR <15 mL/min/1 73 square meters)  Note: GFR calculation is accurate only with a steady state creatinine    CBC and differential [[id number]]  (Abnormal) Collected: 08/08/22 1904    Lab Status: Final result Specimen: Blood from Arm, Right Updated: 08/08/22 1920     WBC 12 13 Thousand/uL      RBC 3 35 Million/uL      Hemoglobin 10 3 g/dL      Hematocrit 31 6 %      MCV 94 fL      MCH 30 7 pg      MCHC 32 6 g/dL      RDW 12 6 %      MPV 10 7 fL      Platelets 453 Thousands/uL      nRBC 0 /100 WBCs      Neutrophils Relative 75 %      Immat GRANS % 1 %      Lymphocytes Relative 16 %      Monocytes Relative 7 %      Eosinophils Relative 1 %      Basophils Relative 0 %      Neutrophils Absolute 9 10 Thousands/µL      Immature Grans Absolute 0 08 Thousand/uL      Lymphocytes Absolute 1 97 Thousands/µL      Monocytes Absolute 0 88 Thousand/µL      Eosinophils Absolute 0 06 Thousand/µL      Basophils Absolute 0 04 Thousands/µL     Lyme Antibody Profile with reflex to [facility] [[id number]] Collected: 08/08/22 1904    Lab Status:  In process Specimen: Blood from Arm, Right Updated: 08/08/22 1915                 XR chest 1 view portable    (Results Pending)   CT abdomen pelvis wo contrast    (Results Pending)              Procedures  ECG 12 Lead Documentation Only    Date/Time: 8/9/2022 12:25 AM  Performed by: Layla Lazo MD  Authorized by: Layla Lazo MD     ECG reviewed by me, the ED Provider: yes    Patient location:  ED  Previous ECG:     Previous ECG:  Compared to current    Comparison ECG info:  6/20/2017    Similarity:  Changes noted  Interpretation:     Interpretation: non-specific    Rate:     ECG rate:  49    ECG rate assessment: bradycardic    Rhythm:     Rhythm: sinus rhythm and sinus bradycardia    QRS:     QRS axis:  Normal    QRS intervals:  Normal  Conduction:     Conduction: abnormal      Abnormal conduction: 1st degree    ST segments:     ST segments:  Normal  T waves:     T waves: normal               ED Course                               SBIRT 20yo+    Flowsheet Row Most Recent Value   SBIRT (23 yo +)    In order to provide better care to our patients, we are screening all of our patients for alcohol and drug use  Would it be okay to ask you these screening questions? No Filed at: 08/08/2022 1842        DANIEL Risk Score    Flowsheet Row Most Recent Value   Age >= 72 1 Filed at: 08/08/2022 2128   Known CAD (stenosis >= 50%) 0 Filed at: 08/08/2022 2128   Recent (<=24 hrs) Service Angina 0 Filed at: 08/08/2022 2128   ST Deviation >= 0 5 mm 0 Filed at: 08/08/2022 2128   3+ CAD Risk Factors (FHx, HTN, HLP, DM, Smoker) 1 Filed at: 08/08/2022 2128   Aspirin Use Past 7 Days 0 Filed at: 08/08/2022 2128   Elevated Cardiac Markers 0 Filed at: 08/08/2022 2128   DANIEL Risk Score (Calculated) 2 Filed at: 08/08/2022 2128                  MDM  Number of Diagnoses or Management Options  JOSE LUIS (acute kidney injury) [facility]): new and requires workup  Dyspnea on exertion: new and requires workup  Elevated brain natriuretic peptide (BNP) level: new and requires workup  Diagnosis management comments: 1  Shortness of breath - Pt with intermittent shortness of breath on exertion for the last 3 weeks, with lightheadedness, worse today  Will check ECG, troponin to rule out acute MI, CBC for anemia, metabolic panel for electrolyte abnormalities and dehydration, BNP and CXR for CHF          Amount and/or Complexity of Data Reviewed  Clinical lab tests: ordered and reviewed  Tests in the radiology section of CPT®: reviewed and ordered  Obtain history from someone other than the patient: yes  Discuss the patient with other providers: yes  Independent visualization of images, tracings, or specimens: yes    Patient Progress  Patient progress: stable      Disposition  Final diagnoses:   JOSE LUIS (acute kidney injury) ([facility] [address] )   Dyspnea on exertion   Elevated brain natriuretic peptide (BNP) level     Time reflects when diagnosis was documented in both MDM as applicable and the Disposition within this note     Time User Action Codes Description Comment    8/8/2022  8:12 PM Nellygasper Carpenter E Add [N17 [id number]] JOSE LUIS (acute kidney injury) ([facility] [address] )     8/8/2022  8:12 PM Nelly Prow E Add [R06 [id number]] Dyspnea on exertion     8/8/2022  8:13 PM Nelly Prow E Add [[id number]] Elevated brain natriuretic peptide (BNP) level       ED Disposition     ED Disposition   Admit    Condition   Stable    Date/Time   Mon Aug 8, 2022  8:12 PM    Comment   Case was discussed with PIPER and the patient's admission status was agreed to be Admission Status: inpatient status to the service of Dr Jordan Kat              Follow-up Information    None         Current Discharge Medication List      CONTINUE these medications which have NOT CHANGED    Details   amLODIPine (NORVASC) 5 mg tablet Take 1 tablet (5 mg total) by mouth daily  Qty: 90 tablet, Refills: 3    Associated Diagnoses: Essential hypertension      Ascorbic Acid (vitamin C) 1000 MG tablet Take 1,000 mg by mouth daily      cholecalciferol (VITAMIN D3) 1,000 units tablet Take 1,000 Units by mouth daily      hydrochlorothiazide (HYDRODIURIL) 25 mg tablet Take 1 tablet (25 mg total) by mouth daily  Qty: 90 tablet, Refills: 3    Associated Diagnoses: Essential hypertension      losartan (COZAAR) 100 MG tablet Take 1 tablet (100 mg total) by mouth daily  Qty: 90 tablet, Refills: 3    Associated Diagnoses: Essential hypertension      metoprolol succinate (TOPROL-XL) 50 mg 24 hr tablet Take 1 tablet (50 mg total) by mouth daily  Qty: 90 tablet, Refills: 3    Associated Diagnoses: Essential hypertension      Multiple Vitamin (MULTI-VITAMIN DAILY PO) Take 1 tablet by mouth daily      ROCKLATAN 0 02-0 005 % SOLN Refills: 4 spironolactone (ALDACTONE) 25 mg tablet Take 1 tablet (25 mg total) by mouth daily  Qty: 90 tablet, Refills: 3    Associated Diagnoses: Hypertension, unspecified type      tamsulosin (FLOMAX) 0 4 mg Take 1 capsule (0 4 mg total) by mouth daily at bedtime  Qty: 90 capsule, Refills: 3    Associated Diagnoses: Benign prostatic hyperplasia with lower urinary tract symptoms, symptom details unspecified      zinc gluconate 50 mg tablet Take 50 mg by mouth daily             No discharge procedures on file      PDMP Review     None          ED Provider  Electronically Signed by           Kasandra Gallardo MD  08/09/22 9114

## 2022-08-09 NOTE — ASSESSMENT & PLAN NOTE
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

## 2022-08-09 NOTE — ASSESSMENT & PLAN NOTE
Lightheadness/dyspnea on exertion likely related to dehydration, low blood pressure   Sx improved with hydration  EKG sinus bradycardia, rate 49, first-degree AV block, two second sinus pause, no st elevation  -troponin negative x1; beta blocker held   -BNP elevated at 2200 - but reviewed with nephrology, felt elevated in setting of JOSE LUIS and patient intravascularly depleted    XR negative  Await echo  apprec cards  All BP meds held, will likely be discharged on truncated regimen  D dimer negative

## 2022-08-09 NOTE — PROGRESS NOTES
[address] [place] [address]  Progress Note Bhupinder Pages 1950, 80 y o  male MRN: [id number]  Unit/Bed#: E4 -01 Encounter: [id number]  Primary Care Provider: Ameena AMOR,    Date and time admitted to hospital: 8/8/2022  6:35 PM    Normocytic anemia  Assessment & Plan  Hemoglobin 10 3 with MCV in the mid 90s  Hemoglobin has been slowly down trending over the past year from 12  Appears to have anemia of chronic dz based on iron studies  No signs of [facility] gi bleeding    Enlarged prostate  Assessment & Plan  History of prostatomegaly for which he has seen Urology in the past and they had at 1 point recommended a prostate surgery  Plan  Prostate enlarged on CT scan  Recommend outpt urology f/u    Lymph nodes enlarged  Assessment & Plan  See above for details in JOSE LUIS section    Dyspnea on exertion  Assessment & Plan  As above in lightheadedness section    JOSE LUIS (acute kidney injury) ([facility], [address] )  Assessment & Plan  Improved with hydration, cont hydration per nephrology   Appears patient probably has baseline CKD with creatinine baseline probably around 1 3-1 5    Bladder scan performed with less than 70 cc  Patient has a history of prostatomegaly and patient also underwent a CT scan in October 2021 that was notable for the prostatomegaly as well as notable for stranding of the small bowel mesentery with borderline lymph nodes that was 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 though early lymphoma could present similarly and a recommended follow-up CT in 6 months was recommended at that time but patient has not gotten that performed  Repeat CT:  This is a nonspecific finding which can be idiopathic or related to various disease processes ranging from benign mesenteritis to liver disease to lymphoma   Lack of pathologically enlarged lymph nodes is somewhat reassuring      Will recommend patient to f/u with PCP further      * Lightheadedness  Assessment & Plan  Lightheadness/dyspnea on exertion likely related to dehydration, low blood pressure   Sx improved with hydration  EKG sinus bradycardia, rate 49, first-degree AV block, two second sinus pause, no st elevation  -troponin negative x1; beta blocker held   -BNP elevated at 2200 - but reviewed with nephrology, felt elevated in setting of JOSE LUIS and patient intravascularly depleted  XR negative  Await echo  apprec cards  All BP meds held, will likely be discharged on truncated regimen  D dimer negative          VTE  Prophylaxis:   Pharmacologic: in place    Patient Centered Rounds: I have performed bedside rounds with nursing staff today  Discussions with Specialists or Other Care Team Provider: case management    Education and Discussions with Family / Patient: pt wife bedside      Current Length of Stay: 1 day(s)    Current Patient Status: Inpatient        Code Status: Level 1 - Full Code      Subjective:   Pt feels a lot better with hydration  Able to ambulate freely without dyspnea today    Patient is seen and examined at bedside  All other ROS are negative  Objective:     Vitals:   Temp (24hrs), Av 2 °F (36 8 °C), Min:97 6 °F (36 4 °C), Max:98 8 °F (37 1 °C)    Temp:  [97 6 °F (36 4 °C)-98 8 °F (37 1 °C)] 97 6 °F (36 4 °C)  HR:  [54-61] 57  Resp:  [16-20] 18  BP: (104-124)/(51-57) 112/57  SpO2:  [95 %-98 %] 95 %  Body mass index is 36 8 kg/m²  Input and Output Summary (last 24 hours):        Intake/Output Summary (Last 24 hours) at 2022 1208  Last data filed at 2022 0801  Gross per 24 hour   Intake 5240 ml   Output 250 ml   Net 4990 ml       Physical Exam:       GEN: No acute distress, comfortable  HEEENT: No JVD, PERRLA, no scleral icterus  RESP: Lungs clear to auscultation bilaterally  CV: RRR, +s1/s2   ABD: SOFT NON TENDER, POSITIVE BOWEL SOUNDS, NO DISTENTION  PSYCH: CALM  NEURO: A X O X 3, NO FOCAL DEFICITS  SKIN: NO RASH  EXTREM: NO EDEMA    Additional Data:     Labs:    Results from last 7 days   Lab Units 22  0540 WBC Thousand/uL 8 84   HEMOGLOBIN g/dL 9 9*   HEMATOCRIT % 31 1*   PLATELETS Thousands/uL 290   NEUTROS PCT % 67   LYMPHS PCT % 22   MONOS PCT % 8   EOS PCT % 1     Results from last 7 days   Lab Units 08/09/22  0540   SODIUM mmol/L 139   POTASSIUM mmol/L 4 1   CHLORIDE mmol/L 104   CO2 mmol/L 24   BUN mg/dL 32*   CREATININE mg/dL 1 78*   ANION GAP mmol/L 11   CALCIUM mg/dL 8 5   ALBUMIN g/dL 2 9*   TOTAL BILIRUBIN mg/dL 0 31   ALK PHOS U/L 107   ALT U/L 22   AST U/L 15   GLUCOSE RANDOM mg/dL 133     Results from last 7 days   Lab Units 08/09/22  0540   INR  1 17         Results from last 7 days   Lab Units 08/06/22  0957   HEMOGLOBIN A1C % 6 5*               * I Have Reviewed All Lab Data Listed Above  Imaging:     Results for orders placed during the hospital encounter of 08/08/22    XR chest 1 view portable    Narrative  CHEST    INDICATION:   shortness of breath  COMPARISON:  3/21/2020  EXAM PERFORMED/VIEWS:  XR CHEST PORTABLE      FINDINGS:    Cardiomediastinal silhouette appears unremarkable  The lungs are clear  No pneumothorax or pleural effusion  Osseous structures appear within normal limits for patient age  Impression  No acute cardiopulmonary disease  Workstation performed: [id number]    Results for orders placed in visit on 03/21/20    XR chest pa & lateral    Narrative  CHEST    INDICATION:   R05: Cough  COMPARISON:  June 20, 2017  EXAM PERFORMED/VIEWS:  XR CHEST PA & LATERAL      FINDINGS:    Cardiomediastinal silhouette appears unremarkable  Mild perihilar peribronchial wall thickening, suggestive of bronchitis / airway disease  No focal consolidation, pleural effusion or pneumothorax  Age-appropriate degenerative changes are noted in the spine  Impression  Mild perihilar peribronchial wall thickening, suggestive of bronchitis / airway disease  No focal consolidation, pleural effusion or pneumothorax          Workstation performed: [id number]      *I have reviewed all imaging reports listed above      Recent Cultures (last 7 days):           Last 24 Hours Medication List:   Current Facility-Administered Medications   Medication Dose Route Frequency Provider Last Rate    acetaminophen  650 mg Oral Q6H PRN Chetna Apple DO      heparin (porcine)  5,000 Units Subcutaneous [facility] Chetna Apple DO          Today, Patient Was Seen By: Mary Hsieh MD    ** Please Note: Dictation voice to text software may have been used in the creation of this document   **

## 2022-08-09 NOTE — PHYSICAL THERAPY NOTE
Physical Therapy Screen    Patient Name: Dion Peralta    KEHPW'R Date: 8/9/2022     Problem List  Principal Problem:    Lightheadedness  Active Problems:    JOSE LUIS (acute kidney injury) ([address] )    Dyspnea on exertion    Lymph nodes enlarged    Enlarged prostate    Normocytic anemia       Past Medical History  Past Medical History:   Diagnosis Date    Exposure to hepatitis     treated with medication    Hearing loss of aging     History of blood transfusion     as a child    History of total left knee replacement     HL (hearing loss)     Hx of exposure to tuberculosis     treated in past with meds    Hyperlipidemia     Hypertension     OA (osteoarthritis)     bea  knees    Ocular hypertension of left eye     Tinnitus     Use of cane as ambulatory aid     Wears glasses         Past Surgical History  Past Surgical History:   Procedure Laterality Date    ADENOIDECTOMY      CATARACT EXTRACTION Bilateral     COLONOSCOPY      JOINT REPLACEMENT Left     knee    ND TOTAL KNEE ARTHROPLASTY Left 7/14/2017    Procedure: ARTHROPLASTY KNEE TOTAL;  Surgeon: Ant Mcneil MD;  Location: [facility];  Service: Orthopedics    ND TOTAL KNEE ARTHROPLASTY Right 9/5/2017    Procedure: ARTHROPLASTY KNEE TOTAL;  Surgeon: Ant Mcneil MD;  Location: [facility];  Service: Orthopedics    TONSILLECTOMY      WISDOM TOOTH EXTRACTION          ASSESSMENT:    Pt  80 y o male admitted for near syncope episodes & JOSE LUIS  Pt seen for PT SCREEN only  Per OT, pt functioning at baseline  Pt completely I w/o AD  No skilled PT indicated at this time  Will D/C PT  Please advise PT when needs change  Nsg notified  PLAN:    D/C PT    RECOMMENDATION:  Return home when medically cleared       Scar Zaldivar, PT

## 2022-08-09 NOTE — PLAN OF CARE
Problem: Potential for Falls  Goal: Patient will remain free of falls  Description: INTERVENTIONS:  - Educate patient/family on patient safety including physical limitations  - Instruct patient to call for assistance with activity   - Consult OT/PT to assist with strengthening/mobility   - Keep Call bell within reach  - Keep bed low and locked with side rails adjusted as appropriate  - Keep care items and personal belongings within reach  - Initiate and maintain comfort rounds  - Make Fall Risk Sign visible to staff    Outcome: Progressing     Problem: PAIN - ADULT  Goal: Verbalizes/displays adequate comfort level or baseline comfort level  Description: Interventions:  - Encourage patient to monitor pain and request assistance  - Assess pain using appropriate pain scale  - Administer analgesics based on type and severity of pain and evaluate response  - Implement non-pharmacological measures as appropriate and evaluate response  - Consider cultural and social influences on pain and pain management  - Notify physician/advanced practitioner if interventions unsuccessful or patient reports new pain  Outcome: Progressing     Problem: INFECTION - ADULT  Goal: Absence or prevention of progression during hospitalization  Description: INTERVENTIONS:  - Assess and monitor for signs and symptoms of infection  - Monitor lab/diagnostic results  - Monitor all insertion sites, i e  indwelling lines, tubes, and drains  - Monitor endotracheal if appropriate and nasal secretions for changes in amount and color  - [place] appropriate cooling/warming therapies per order  - Administer medications as ordered  - Instruct and encourage patient and family to use good hand hygiene technique  - Identify and instruct in appropriate isolation precautions for identified infection/condition  Outcome: Progressing     Problem: SAFETY ADULT  Goal: Patient will remain free of falls  Description: INTERVENTIONS:  - Educate patient/family on patient safety including physical limitations  - Instruct patient to call for assistance with activity   - Consult OT/PT to assist with strengthening/mobility   - Keep Call bell within reach  - Keep bed low and locked with side rails adjusted as appropriate  - Keep care items and personal belongings within reach  - Initiate and maintain comfort rounds  - Make Fall Risk Sign visible to staff    Outcome: Progressing  Goal: Maintain or return to baseline ADL function  Description: INTERVENTIONS:  - Educate patient/family on patient safety including physical limitations  - Instruct patient to call for assistance with activity   - Consult OT/PT to assist with strengthening/mobility   - Keep Call bell within reach  - Keep bed low and locked with side rails adjusted as appropriate  - Keep care items and personal belongings within reach  - Initiate and maintain comfort rounds  - Make Fall Risk Sign visible to staff    Outcome: Progressing  Goal: Maintains/Returns to pre admission functional level  Description: INTERVENTIONS:  - Perform BMAT or MOVE assessment daily    - Set and communicate daily mobility goal to care team and patient/family/caregiver     - Collaborate with rehabilitation services on mobility goals if consulted  - Out of bed for toileting  - Record patient progress and toleration of activity level   Outcome: Progressing     Problem: DISCHARGE PLANNING  Goal: Discharge to home or other facility with appropriate resources  Description: INTERVENTIONS:  - Identify barriers to discharge w/patient and caregiver  - Arrange for needed discharge resources and transportation as appropriate  - Identify discharge learning needs (meds, wound care, etc )  - Arrange for interpretive services to assist at discharge as needed  - Refer to Case Management Department for coordinating discharge planning if the patient needs post-hospital services based on physician/advanced practitioner order or complex needs related to functional status, cognitive ability, or social support system  Outcome: Progressing     Problem: Knowledge Deficit  Goal: Patient/family/caregiver demonstrates understanding of disease process, treatment plan, medications, and discharge instructions  Description: Complete learning assessment and assess knowledge base    Interventions:  - Provide teaching at level of understanding  - Provide teaching via preferred learning methods  Outcome: Progressing     Problem: CARDIOVASCULAR - ADULT  Goal: Maintains optimal cardiac output and hemodynamic stability  Description: INTERVENTIONS:  - Monitor I/O, vital signs and rhythm  - Monitor for S/S and trends of decreased cardiac output  - Administer and titrate ordered vasoactive medications to optimize hemodynamic stability  - Assess quality of pulses, skin color and temperature  - Assess for signs of decreased coronary artery perfusion  - Instruct patient to report change in severity of symptoms  Outcome: Progressing  Goal: Absence of cardiac dysrhythmias or at baseline rhythm  Description: INTERVENTIONS:  - Continuous cardiac monitoring, vital signs, obtain 12 lead EKG if ordered  - Administer antiarrhythmic and heart rate control medications as ordered  - Monitor electrolytes and administer replacement therapy as ordered  Outcome: Progressing     Problem: HEMATOLOGIC - ADULT  Goal: Maintains hematologic stability  Description: INTERVENTIONS  - Assess for signs and symptoms of bleeding or hemorrhage  - Monitor labs  - Administer supportive blood products/factors as ordered and appropriate  Outcome: Progressing

## 2022-08-09 NOTE — OCCUPATIONAL THERAPY NOTE
Occupational Therapy       Patient Name: Nicholas Olivares  DPEUX'R Date: 8/9/2022 08/09/22 0845   Note Type   Note type Screen   Additional Comments OT consult received  Upon arrival, patient up in his room  (I) with mobility and ADLs  Deferring OT eval at this time, per patient request  He demonstrates good safety and does not demonstrate a need  Communicated with PT to discontinue orders  OT orders discontinued      Loreda Else, OT

## 2022-08-09 NOTE — ASSESSMENT & PLAN NOTE
Hemoglobin 10 3 with MCV in the mid 90s  Hemoglobin has been slowly down trending over the past year from 12  Plan  > order iron studies, B12, folate  > CT abdomen pelvis ordered as above  > patient may need consult with Hematology and or GI team during hospitalization depending on workup and where hemoglobin trends to  Additionally patient with a mild leukocytosis of 12 13 but afebrile  Current etiology thought to be reactive in nature but will closely monitor for any possible infection  With patient's anemia, mild leukocytosis and borderline enlarged lymph nodes on CT back in October 2021 we are further evaluating with the CT imaging

## 2022-08-09 NOTE — ASSESSMENT & PLAN NOTE
History of prostatomegaly for which he has seen Urology in the past and they had at 1 point recommended a prostate surgery  Plan  > further evaluate with CT imaging

## 2022-08-10 VITALS
HEART RATE: 68 BPM | OXYGEN SATURATION: 98 % | RESPIRATION RATE: 18 BRPM | DIASTOLIC BLOOD PRESSURE: 76 MMHG | TEMPERATURE: 97.8 F | HEIGHT: 68 IN | BODY MASS INDEX: 36.68 KG/M2 | WEIGHT: 242 LBS | SYSTOLIC BLOOD PRESSURE: 132 MMHG

## 2022-08-10 LAB
ALBUMIN SERPL BCP-MCNC: 3 G/DL (ref 3.5–5)
ALP SERPL-CCNC: 108 U/L (ref 46–116)
ALT SERPL W P-5'-P-CCNC: 24 U/L (ref 12–78)
ANION GAP SERPL CALCULATED.3IONS-SCNC: 10 MMOL/L (ref 4–13)
AST SERPL W P-5'-P-CCNC: 16 U/L (ref 5–45)
ATRIAL RATE: 55 BPM
ATRIAL RATE: 55 BPM
B BURGDOR IGG PATRN SER IB-IMP: NEGATIVE
B BURGDOR IGM PATRN SER IB-IMP: POSITIVE
B BURGDOR18KD IGG SER QL IB: PRESENT
B BURGDOR23KD IGG SER QL IB: ABNORMAL
B BURGDOR23KD IGM SER QL IB: PRESENT
B BURGDOR28KD IGG SER QL IB: ABNORMAL
B BURGDOR30KD IGG SER QL IB: ABNORMAL
B BURGDOR39KD IGG SER QL IB: PRESENT
B BURGDOR39KD IGM SER QL IB: PRESENT
B BURGDOR41KD IGG SER QL IB: PRESENT
B BURGDOR41KD IGM SER QL IB: PRESENT
B BURGDOR45KD IGG SER QL IB: ABNORMAL
B BURGDOR58KD IGG SER QL IB: PRESENT
B BURGDOR66KD IGG SER QL IB: ABNORMAL
B BURGDOR93KD IGG SER QL IB: ABNORMAL
BASOPHILS # BLD AUTO: 0.05 THOUSANDS/ΜL (ref 0–0.1)
BASOPHILS NFR BLD AUTO: 1 % (ref 0–1)
BILIRUB SERPL-MCNC: 0.39 MG/DL (ref 0.2–1)
BUN SERPL-MCNC: 23 MG/DL (ref 5–25)
CALCIUM ALBUM COR SERPL-MCNC: 9.3 MG/DL (ref 8.3–10.1)
CALCIUM SERPL-MCNC: 8.5 MG/DL (ref 8.3–10.1)
CHLORIDE SERPL-SCNC: 102 MMOL/L (ref 96–108)
CO2 SERPL-SCNC: 24 MMOL/L (ref 21–32)
CREAT SERPL-MCNC: 1.44 MG/DL (ref 0.6–1.3)
EOSINOPHIL # BLD AUTO: 0.13 THOUSAND/ΜL (ref 0–0.61)
EOSINOPHIL NFR BLD AUTO: 1 % (ref 0–6)
ERYTHROCYTE [DISTWIDTH] IN BLOOD BY AUTOMATED COUNT: 12.7 % (ref 11.6–15.1)
GFR SERPL CREATININE-BSD FRML MDRD: 48 ML/MIN/1.73SQ M
GLUCOSE SERPL-MCNC: 135 MG/DL (ref 65–140)
HCT VFR BLD AUTO: 31.6 % (ref 36.5–49.3)
HGB BLD-MCNC: 10.3 G/DL (ref 12–17)
IMM GRANULOCYTES # BLD AUTO: 0.05 THOUSAND/UL (ref 0–0.2)
IMM GRANULOCYTES NFR BLD AUTO: 1 % (ref 0–2)
LYMPHOCYTES # BLD AUTO: 1.65 THOUSANDS/ΜL (ref 0.6–4.47)
LYMPHOCYTES NFR BLD AUTO: 18 % (ref 14–44)
MCH RBC QN AUTO: 30.9 PG (ref 26.8–34.3)
MCHC RBC AUTO-ENTMCNC: 32.6 G/DL (ref 31.4–37.4)
MCV RBC AUTO: 95 FL (ref 82–98)
MONOCYTES # BLD AUTO: 0.66 THOUSAND/ΜL (ref 0.17–1.22)
MONOCYTES NFR BLD AUTO: 7 % (ref 4–12)
NEUTROPHILS # BLD AUTO: 6.67 THOUSANDS/ΜL (ref 1.85–7.62)
NEUTS SEG NFR BLD AUTO: 72 % (ref 43–75)
NRBC BLD AUTO-RTO: 0 /100 WBCS
P AXIS: 37 DEGREES
P AXIS: 43 DEGREES
PLATELET # BLD AUTO: 304 THOUSANDS/UL (ref 149–390)
PMV BLD AUTO: 11.1 FL (ref 8.9–12.7)
POTASSIUM SERPL-SCNC: 4.3 MMOL/L (ref 3.5–5.3)
PR INTERVAL: 276 MS
PR INTERVAL: 328 MS
PROT SERPL-MCNC: 7.1 G/DL (ref 6.4–8.4)
QRS AXIS: 49 DEGREES
QRS AXIS: 55 DEGREES
QRSD INTERVAL: 100 MS
QRSD INTERVAL: 102 MS
QT INTERVAL: 432 MS
QT INTERVAL: 442 MS
QTC INTERVAL: 399 MS
QTC INTERVAL: 413 MS
RBC # BLD AUTO: 3.33 MILLION/UL (ref 3.88–5.62)
SODIUM SERPL-SCNC: 136 MMOL/L (ref 135–147)
T WAVE AXIS: 31 DEGREES
T WAVE AXIS: 31 DEGREES
VENTRICULAR RATE: 49 BPM
VENTRICULAR RATE: 55 BPM
WBC # BLD AUTO: 9.21 THOUSAND/UL (ref 4.31–10.16)

## 2022-08-10 PROCEDURE — 99239 HOSP IP/OBS DSCHRG MGMT >30: CPT | Performed by: HOSPITALIST

## 2022-08-10 PROCEDURE — 93010 ELECTROCARDIOGRAM REPORT: CPT | Performed by: INTERNAL MEDICINE

## 2022-08-10 PROCEDURE — 99232 SBSQ HOSP IP/OBS MODERATE 35: CPT

## 2022-08-10 PROCEDURE — 85025 COMPLETE CBC W/AUTO DIFF WBC: CPT | Performed by: HOSPITALIST

## 2022-08-10 PROCEDURE — 80053 COMPREHEN METABOLIC PANEL: CPT | Performed by: HOSPITALIST

## 2022-08-10 PROCEDURE — 99232 SBSQ HOSP IP/OBS MODERATE 35: CPT | Performed by: INTERNAL MEDICINE

## 2022-08-10 RX ORDER — HYDROCHLOROTHIAZIDE 25 MG/1
25 TABLET ORAL DAILY PRN
Qty: 90 TABLET | Refills: 0 | Status: SHIPPED | OUTPATIENT
Start: 2022-08-10 | End: 2022-08-12

## 2022-08-10 RX ORDER — SPIRONOLACTONE 25 MG/1
25 TABLET ORAL DAILY PRN
Qty: 90 TABLET | Refills: 0 | Status: SHIPPED | OUTPATIENT
Start: 2022-08-10 | End: 2022-08-12

## 2022-08-10 RX ORDER — METOPROLOL SUCCINATE 50 MG/1
25 TABLET, EXTENDED RELEASE ORAL DAILY
Qty: 90 TABLET | Refills: 0 | Status: SHIPPED | OUTPATIENT
Start: 2022-08-10 | End: 2022-09-09

## 2022-08-10 RX ORDER — SODIUM CHLORIDE, SODIUM GLUCONATE, SODIUM ACETATE, POTASSIUM CHLORIDE, MAGNESIUM CHLORIDE, SODIUM PHOSPHATE, DIBASIC, AND POTASSIUM PHOSPHATE .53; .5; .37; .037; .03; .012; .00082 G/100ML; G/100ML; G/100ML; G/100ML; G/100ML; G/100ML; G/100ML
75 INJECTION, SOLUTION INTRAVENOUS ONCE
Status: COMPLETED | OUTPATIENT
Start: 2022-08-10 | End: 2022-08-10

## 2022-08-10 RX ORDER — LOSARTAN POTASSIUM 100 MG/1
50 TABLET ORAL DAILY
Qty: 90 TABLET | Refills: 0 | Status: SHIPPED | OUTPATIENT
Start: 2022-08-10 | End: 2022-09-09

## 2022-08-10 RX ADMIN — HEPARIN SODIUM 5000 UNITS: 5000 INJECTION INTRAVENOUS; SUBCUTANEOUS at 05:52

## 2022-08-10 RX ADMIN — SODIUM CHLORIDE, SODIUM GLUCONATE, SODIUM ACETATE, POTASSIUM CHLORIDE, MAGNESIUM CHLORIDE, SODIUM PHOSPHATE, DIBASIC, AND POTASSIUM PHOSPHATE 75 ML/HR: .53; .5; .37; .037; .03; .012; .00082 INJECTION, SOLUTION INTRAVENOUS at 09:15

## 2022-08-10 NOTE — PROGRESS NOTES
Progress Note - Cardiology   Jaleesa Acosta 80 y o  male MRN: [id number]  Unit/Bed#: E4 -01 Encounter: [id number]      Assessment/Recommendations/Discussion:    Lightheadedness/orthostasis   Acute kidney injury   Bradycardia, improved with decreasing beta-blocker dosage   Anemia   Hypertension   Dyslipidemia   Pre diabetes   Severe obesity   History of BPH    Results from last 7 days   Lab Units 08/09/22  1100   SL CV LV EF  54        PLAN   His heart rate has improved with decreasing beta-blocker dosage although he did test positive for Lyme antibody   Continue with Toprol XL 25 mg   If symptoms continue as outpatient, could consider rhythm monitor such as Zio patch   Continue with antihypertensive regimen as per Nephrology   Otherwise no further inpatient cardiac workup, okay for DC today    Subjective:   HPI  Feels well today, no chest pain or shortness of breath  Lightheadedness has resolved  Was relatively hypotensive, now back to his usual level      Review of Systems: As noted in HPI  Rest of ROS is negative  Vitals:   /73 (BP Location: Left arm)   Pulse 70   Temp 97 5 °F (36 4 °C) (Temporal)   Resp 18   Ht 5' 8" (1 727 m)   Wt 110 kg (242 lb)   SpO2 98%   BMI 36 80 kg/m²   Vitals:    08/08/22 2144 08/09/22 1015   Weight: 110 kg (242 lb 1 oz) 110 kg (242 lb)       Intake/Output Summary (Last 24 hours) at 8/10/2022 1352  Last data filed at 8/10/2022 9901  Gross per 24 hour   Intake 720 ml   Output 1200 ml   Net -480 ml       Physical Exam   Constitutional: awake, alert and oriented, in no acute distress, no obvious deformities  Head: Normocephalic, without obvious abnormality, atraumatic  Eyes: conjunctivae clear and moist  Sclera anicteric  No xanthelasmas  Pupils equal bilaterally  Extraocular motions are full    Ear nose mouth and throat: ears are symmetrical bilaterally, hearing appears to be equal bilaterally, no nasal discharge or epistaxis, oropharynx is clear with moist mucous membranes  Neck:  Trachea is midline, neck is supple, no thyromegaly or significant lymphadenopathy, there is full range of motion  Lungs: clear to auscultation bilaterally, no wheezes, no rales, no rhonchi, no accessory muscle use, breathing is nonlabored  Heart: regular rate and rhythm, S1, S2 normal, no murmur, no click, no rub and no gallop, no lower extremity edema  Abdomen: soft, non-tender; bowel sounds normal; no masses,  no organomegaly  Psychiatric:  Patient is oriented to time, place, person, mood/affect is negative for depression, anxiety, agitation, appears to have appropriate insight  Skin: Skin is warm, dry, intact  No obvious rashes or lesions on exposed extremities  Nail beds are pink with no cyanosis or clubbing      TELEMETRY:  No bradycardia or heart block  Lab Results:  Results from last 7 days   Lab Units 08/10/22  0618   WBC Thousand/uL 9 21   HEMOGLOBIN g/dL 10 3*   HEMATOCRIT % 31 6*   PLATELETS Thousands/uL 304     Results from last 7 days   Lab Units 08/10/22  0618   POTASSIUM mmol/L 4 3   CHLORIDE mmol/L 102   CO2 mmol/L 24   BUN mg/dL 23   CREATININE mg/dL 1 44*   CALCIUM mg/dL 8 5   ALK PHOS U/L 108   ALT U/L 24   AST U/L 16     Results from last 7 days   Lab Units 08/10/22  0618   POTASSIUM mmol/L 4 3   CHLORIDE mmol/L 102   CO2 mmol/L 24   BUN mg/dL 23   CREATININE mg/dL 1 44*   CALCIUM mg/dL 8 5           Medications:    Current Facility-Administered Medications:     acetaminophen (TYLENOL) tablet 650 mg, 650 mg, Oral, Q6H PRN, Noreen Virgen DO    heparin (porcine) subcutaneous injection 5,000 Units, 5,000 Units, Subcutaneous, Q8H [address], Korinaldlavinia Virgen DO, 5,000 Units at 08/10/22 0552    Netarsudil-Latanoprost 0 02-0 005 % SOLN 1 drop, 1 drop, Ophthalmic, Daily With Dinner, Dionicio Kunz MD    tamsulosin [facility]) capsule 0 4 mg, 0 4 mg, Oral, HS, Dionicio Kunz MD, 0 4 mg at 08/09/22 5838    This note was completed in part utilizing M-Modal Fluency Direct Software  Grammatical errors, random word insertions, spelling mistakes, and incomplete sentences may be an occasional consequence of this system secondary to software limitations, ambient noise, and hardware issues  If you have any questions or concerns about the content, text, or information contained within the body of this dictation, please contact the provider for clarification      Jose Black DO, [facility]  8/10/2022 1:52 PM

## 2022-08-10 NOTE — PLAN OF CARE
Problem: Potential for Falls  Goal: Patient will remain free of falls  Description: INTERVENTIONS:  - Educate patient/family on patient safety including physical limitations  - Instruct patient to call for assistance with activity   - Consult OT/PT to assist with strengthening/mobility   - Keep Call bell within reach  - Keep bed low and locked with side rails adjusted as appropriate  - Keep care items and personal belongings within reach  - Initiate and maintain comfort rounds  - Make Fall Risk Sign visible to staff  Outcome: Progressing     Problem: PAIN - ADULT  Goal: Verbalizes/displays adequate comfort level or baseline comfort level  Description: Interventions:  - Encourage patient to monitor pain and request assistance  - Assess pain using appropriate pain scale  - Administer analgesics based on type and severity of pain and evaluate response  - Implement non-pharmacological measures as appropriate and evaluate response  - Consider cultural and social influences on pain and pain management  - Notify physician/advanced practitioner if interventions unsuccessful or patient reports new pain  Outcome: Progressing     Problem: INFECTION - ADULT  Goal: Absence or prevention of progression during hospitalization  Description: INTERVENTIONS:  - Assess and monitor for signs and symptoms of infection  - Monitor lab/diagnostic results  - Monitor all insertion sites, i e  indwelling lines, tubes, and drains  - Monitor endotracheal if appropriate and nasal secretions for changes in amount and color  - [place] appropriate cooling/warming therapies per order  - Administer medications as ordered  - Instruct and encourage patient and family to use good hand hygiene technique  - Identify and instruct in appropriate isolation precautions for identified infection/condition  Outcome: Progressing     Goal: Maintain or return to baseline ADL function  Description: INTERVENTIONS:  - Educate patient/family on patient safety including physical limitations  - Instruct patient to call for assistance with activity   - Consult OT/PT to assist with strengthening/mobility   - Keep Call bell within reach  - Keep bed low and locked with side rails adjusted as appropriate  - Keep care items and personal belongings within reach  - Initiate and maintain comfort rounds  - Make Fall Risk Sign visible to staff    Outcome: Progressing  Goal: Maintains/Returns to pre admission functional level  Description: INTERVENTIONS:  - Perform BMAT or MOVE assessment daily    - Set and communicate daily mobility goal to care team and patient/family/caregiver  - Collaborate with rehabilitation services on mobility goals if consulted  - Perform Range of Motion 3 times a day  - Encourage patient to reposition patient every 2 hours  - Ambulate patient 3 times a day  - Out of bed to chair for meals  - Out of bed for toileting  - Record patient progress and toleration of activity level   Outcome: Progressing     Problem: DISCHARGE PLANNING  Goal: Discharge to home or other facility with appropriate resources  Description: INTERVENTIONS:  - Identify barriers to discharge w/patient and caregiver  - Arrange for needed discharge resources and transportation as appropriate  - Identify discharge learning needs (meds, wound care, etc )  - Arrange for interpretive services to assist at discharge as needed  - Refer to Case Management Department for coordinating discharge planning if the patient needs post-hospital services based on physician/advanced practitioner order or complex needs related to functional status, cognitive ability, or social support system  Outcome: Progressing     Problem: Knowledge Deficit  Goal: Patient/family/caregiver demonstrates understanding of disease process, treatment plan, medications, and discharge instructions  Description: Complete learning assessment and assess knowledge base    Interventions:  - Provide teaching at level of understanding  - Provide teaching via preferred learning methods  Outcome: Progressing     Problem: CARDIOVASCULAR - ADULT  Goal: Maintains optimal cardiac output and hemodynamic stability  Description: INTERVENTIONS:  - Monitor I/O, vital signs and rhythm  - Monitor for S/S and trends of decreased cardiac output  - Administer and titrate ordered vasoactive medications to optimize hemodynamic stability  - Assess quality of pulses, skin color and temperature  - Assess for signs of decreased coronary artery perfusion  - Instruct patient to report change in severity of symptoms  Outcome: Progressing  Goal: Absence of cardiac dysrhythmias or at baseline rhythm  Description: INTERVENTIONS:  - Continuous cardiac monitoring, vital signs, obtain 12 lead EKG if ordered  - Administer antiarrhythmic and heart rate control medications as ordered  - Monitor electrolytes and administer replacement therapy as ordered  Outcome: Progressing     Problem: HEMATOLOGIC - ADULT  Goal: Maintains hematologic stability  Description: INTERVENTIONS  - Assess for signs and symptoms of bleeding or hemorrhage  - Monitor labs  - Administer supportive blood products/factors as ordered and appropriate  Outcome: Progressing     Problem: METABOLIC, FLUID AND ELECTROLYTES - ADULT  Goal: Fluid balance maintained  Description: INTERVENTIONS:  - Monitor labs   - Monitor I/O and WT  - Instruct patient on fluid and nutrition as appropriate  - Assess for signs & symptoms of volume excess or deficit  Outcome: Progressing

## 2022-08-10 NOTE — PROGRESS NOTES
[address] [place] [address]  Progress Note Lorena Melo 1950, 80 y o  male MRN: [id number]  Unit/Bed#: E4 -01 Encounter: [id number]  Primary Care Provider: Alvaro DWYER,    Date and time admitted to hospital: 8/8/2022  6:35 PM    Normocytic anemia  Assessment & Plan  Hemoglobin 10 3 with MCV in the mid 90s  Hemoglobin has been slowly down trending over the past year from 12  Appears to have anemia of chronic dz based on iron studies  No signs of [facility] gi bleeding, stable    Enlarged prostate  Assessment & Plan  History of prostatomegaly for which he has seen Urology in the past and they had at 1 point recommended a prostate surgery  Plan  Prostate enlarged on CT scan  Recommend outpt urology f/u    Lymph nodes enlarged  Assessment & Plan  See above for details in JOSE LUIS section    Dyspnea on exertion  Assessment & Plan  As above in lightheadedness section    JOSE LUIS (acute kidney injury) ([facility] [address] )  Assessment & Plan  Improved with hydration, cont hydration per nephrology   Appears patient probably has baseline CKD with creatinine baseline probably around 1 3-1 5    Bladder scan performed with less than 70 cc  Patient has a history of prostatomegaly and patient also underwent a CT scan in October 2021 that was notable for the prostatomegaly as well as notable for stranding of the small bowel mesentery with borderline lymph nodes that was 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 though early lymphoma could present similarly and a recommended follow-up CT in 6 months was recommended at that time but patient has not gotten that performed  Repeat CT:  This is a nonspecific finding which can be idiopathic or related to various disease processes ranging from benign mesenteritis to liver disease to lymphoma   Lack of pathologically enlarged lymph nodes is somewhat reassuring      Will recommend patient to f/u with PCP further      * Lightheadedness  Assessment & Plan  Lightheadness/dyspnea on exertion likely related to dehydration, low blood pressure   Sx improved with hydration  EKG sinus bradycardia, rate 49, first-degree AV block, two second sinus pause, no st elevation  -troponin negative x1; beta blocker held   -BNP elevated at 2200 - but reviewed with nephrology, felt elevated in setting of JOSE LUIS and patient intravascularly depleted  Received IVF 8/10, 8/11 with improvement  XR negative  Echo ok -reviewed with cards felt lyme carditis less likely as hr improved w/ holding beta blocker  apprec cards  All BP meds held, will be discharged on truncated regimen  D dimer negative             Hospital Course: Bertin Mayen is a 80 y o  male patient who originally presented to the hospital on   Admission Orders (From admission, onward)     Ordered        08/08/22 2013  INPATIENT ADMISSION  Once                     due to dehydration likely in the setting of blood pressure medication regimen  Patient was seen by Nephrology and had regimen truncated  Patient was also evaluated by Cardiology and felt to be stable from cardiovascular standpoint  Patient feels much better at the time of release from hospital after receiving IV fluids  His creatinine is now baseline  Initial Lyme antibodies were positive, Western blot is pending  It was reviewed by Cardiology and felt to be less likely that he would have any sort of Lyme carditis as his slow heart rates improved with holding beta-blocker  He will follow-up with nephrology following release from the hospital   He should also follow-up with his PCP in regards to abnormal CT of the abdomen lymphadenopathy  Please see above list of diagnoses and related plan for additional information       Physical Exam:    GEN: No acute distress, comfortable  HEEENT: No JVD, PERRLA, no scleral icterus  RESP: Lungs clear to auscultation bilaterally  CV: RRR, +s1/s2   ABD: SOFT NON TENDER, POSITIVE BOWEL SOUNDS, NO DISTENTION  PSYCH: CALM  NEURO: A X O X 3, NO FOCAL DEFICITS  SKIN: NO RASH  EXTREM: NO EDEMA      Condition at Discharge:  good      Discharge instructions/Information to patient and family:   See after visit summary for information provided to patient and family  Provisions for Follow-Up Care:  See after visit summary for information related to follow-up care and any pertinent home health orders  Disposition:     Home       Discharge Statement:  I spent 34 minutes discharging the patient  This time was spent on the day of discharge  I had direct contact with the patient on the day of discharge  Greater than 50% of the total time was spent examining patient, answering all patient questions, arranging and discussing plan of care with patient as well as directly providing post-discharge instructions  Additional time then spent on discharge activities  Discharge Medications:  See after visit summary for reconciled discharge medications provided to patient and family        ** Please Note: This note has been constructed using a voice recognition system **

## 2022-08-10 NOTE — PROGRESS NOTES
[address] [facility] NOTE   Olga Kaba 80 y o  male MRN: [id number]  Unit/Bed#: E4 -01 Encounter: [id number]  Reason for Consult: JOSE LUIS    ASSESSMENT and PLAN:  Olga Kaba is a 80 y o  male who was admitted to [facility] after presenting with presyncope  A renal consultation is requested today for assistance in the management of acute kidney injury      Acute kidney injury on top of chronic kidney disease stage III  - Etiology of acute kidney injury: Pre renal JOSE LUIS in setting of decreased renal perfusion from intravascular volume depletion, bradycardia and hypotension  - Etiology of chronic kidney disease: Longstanding history of hypertension leading to hypertensive nephrosclerosis, prediabetes, obstructive uropathy from BPH, age-related nephron loss  - Cr at baseline: Appears to be around 1 3-1 5 range  - Cr at presentation: 2 24, Cr at consult: 1 78, Cr today: 1 44  - UA: No protein, no blood, no leukocytes  - UPCR/UACR: No proteinuria or albuminuria   - Renal imaging: No hydronephrosis, no urinary retention     PLAN  - He is esentially hypotensive as his blood pressure at home usually runs higher (130's-140's) on 4 antihypertensive medications including a diuretic  - BP is still borderline off all antihypertensives   - Give IVF at 75 cc/hour for another 500 cc today  - Avoid nephrotoxins, adjust meds to appropriate GFR  - Strict I/O and daily weights  - Trend BMP daily      BP/Volume   - Home medications: Amlodipine 5 mg daily, Losartan 100 mg daily, Hydrochlorothiazide 25 mg daily, Aldactone 25 mg daily and Metoprolol 50 mg daily  - Current medications:  All antihypertensive medications are currently on hold  - He has mild lower extremity edema which is chronic and may be related to Amlodipine use   - Chest x-ray with no evidence of pulmonary edema  - EF 58%, systolic function is normal  Wall motion is normal  Diastolic function is normal   - Antihypertensive regimen for discharge: Decrease Losartan to 50 mg daily  Decrease Metoprolol to 25 mg daily  Stop Amlodipine  Resume Hydrochlorothiazide as outpatient if weight goes above 244 lb  Resume Aldactone as outpatient if SBP remains >140      Normocytic anemia   - Baseline hemoglobin around 10-12  - He has functional iron deficiency, consider iron supplementation      Electrolytes/Acid Base Status   - Stable      Bone Mineral Disease   - Goal Ca 8 5-10 mg/dL, goal Phos 2 7-4 6 mg/dL, goal iPTH 30-70 pg/mL  - Check I-PTH as outpatient      Presyncope/bradycardia  - Lyme Ab positive, western blot pending   - Evaluation and management per cardiology    DISPOSITION:  - Give IVF at 76 cc/hour for another 500 cc today  - Antihypertensive regimen for discharge: Decrease Losartan to 50 mg daily  Decrease Metoprolol to 25 mg daily  Stop Amlodipine  Resume Hydrochlorothiazide as outpatient if weight goes above 244 lb  Resume Aldactone as outpatient if SBP remains >140     SUBJECTIVE / 24H INTERVAL HISTORY:  Feels weak this morning  No chest pain or dyspnea  No lightheadedness or dizziness  Review of Systems   Constitutional: Positive for fatigue  Negative for appetite change, chills and fever  Eyes: Negative for visual disturbance  Respiratory: Negative for cough and shortness of breath  Cardiovascular: Negative for chest pain and leg swelling  Gastrointestinal: Negative for abdominal pain, nausea and vomiting  Endocrine: Negative for polyuria  Genitourinary: Negative for decreased urine volume, difficulty urinating, dysuria, flank pain, frequency, hematuria and urgency  Musculoskeletal: Negative for arthralgias, back pain and joint swelling  Skin: Negative for rash  Neurological: Negative for dizziness, tremors, weakness and numbness       OBJECTIVE:  Current Weight: Weight - Scale: 110 kg (242 lb)  Vitals:    08/09/22 1937 08/09/22 2329 08/10/22 0331 08/10/22 0735   BP: 118/61 119/56 112/57 133/74   BP Location: Left arm Left arm Left arm Right arm   Pulse: 62 66 61 77   Resp: 18 18 18 20   Temp: 98 5 °F (36 9 °C) 98 °F (36 7 °C) 98 °F (36 7 °C) 98 1 °F (36 7 °C)   TempSrc: Temporal Temporal Temporal Temporal   SpO2: 97% 96% 95% 96%   Weight:       Height:           Intake/Output Summary (Last 24 hours) at 8/10/2022 0830  Last data filed at 8/10/2022 9155  Gross per 24 hour   Intake 960 ml   Output 1200 ml   Net -240 ml     General: Awake and alert   Eyes: Conjunctivae pink, sclera anicteric  ENT: Lips and mucous membranes moist  Neck: Supple   Chest: Clear to auscultation bilaterally   CVS: S1 & S2 present, normal rate, regular rhythm, no murmur  Abdomen: Soft, non-tender, non-distended, Bowel sounds normoactive  Extremities: 1+ edema bilaterally   Skin: No rash  Neuro: Awake, alert and oriented  Psych: Mood and affect appropriate     Medications:    Current Facility-Administered Medications:     acetaminophen (TYLENOL) tablet 650 mg, 650 mg, Oral, Q6H PRN, Noreen Virgen DO    heparin (porcine) subcutaneous injection 5,000 Units, 5,000 Units, Subcutaneous, Q8H [address], Noreen Virgen DO, 5,000 Units at 08/10/22 0552    multi-electrolyte (PLASMALYTE-A/ISOLYTE-S PH 7 4) IV solution, 75 mL/hr, Intravenous, Once, Marty Kohli MD    Netarsudil-Latanoprost 0 02-0 005 % SOLN 1 drop, 1 drop, Ophthalmic, Daily With Dinner, Dionicio Kunz MD    tamsulosin [facility]) capsule 0 4 mg, 0 4 mg, Oral, HS, Dionicio Kunz MD, 0 4 mg at 08/09/22 2128    Laboratory Results:  Results from last 7 days   Lab Units 08/10/22  0618 08/09/22  0540 08/08/22  1904 08/06/22  0957   WBC Thousand/uL 9 21 8 84 12 13* 10 19*   HEMOGLOBIN g/dL 10 3* 9 9* 10 3* 11 4*   HEMATOCRIT % 31 6* 31 1* 31 6* 35 2*   PLATELETS Thousands/uL 304 290 319 316   POTASSIUM mmol/L 4 3 4 1 4 3 4 7   CHLORIDE mmol/L 102 104 101 104   CO2 mmol/L 24 24 25 26   BUN mg/dL 23 32* 31* 29*   CREATININE mg/dL 1 44* 1 78* 2 24* 1 57*   CALCIUM mg/dL 8 5 8 5 9 0 9 4   MAGNESIUM mg/dL  --  2 1  -- --        Portions of the record may have been created with voice recognition software  Occasional wrong word or "sound a like" substitutions may have occurred due to the inherent limitations of voice recognition software  Read the chart carefully and recognize, using context, where substitutions have occurred  If you have any questions, please contact the dictating provider

## 2022-08-10 NOTE — ASSESSMENT & PLAN NOTE
Lightheadness/dyspnea on exertion likely related to dehydration, low blood pressure   Sx improved with hydration  EKG sinus bradycardia, rate 49, first-degree AV block, two second sinus pause, no st elevation  -troponin negative x1; beta blocker held   -BNP elevated at 2200 - but reviewed with nephrology, felt elevated in setting of JOSE LUIS and patient intravascularly depleted    Received IVF 8/10, 8/11 with improvement  XR negative  Echo ok -reviewed with cards felt lyme carditis less likely as hr improved w/ holding beta blocker  apprec cards  All BP meds held, will be discharged on truncated regimen  D dimer negative

## 2022-08-10 NOTE — PLAN OF CARE
Problem: Potential for Falls  Goal: Patient will remain free of falls  Description: INTERVENTIONS:  - Educate patient/family on patient safety including physical limitations  - Instruct patient to call for assistance with activity   - Consult OT/PT to assist with strengthening/mobility   - Keep Call bell within reach  - Keep bed low and locked with side rails adjusted as appropriate  - Keep care items and personal belongings within reach  - Initiate and maintain comfort rounds  - Make Fall Risk Sign visible to staff    Outcome: Progressing     Problem: PAIN - ADULT  Goal: Verbalizes/displays adequate comfort level or baseline comfort level  Description: Interventions:  - Encourage patient to monitor pain and request assistance  - Assess pain using appropriate pain scale  - Administer analgesics based on type and severity of pain and evaluate response  - Implement non-pharmacological measures as appropriate and evaluate response  - Consider cultural and social influences on pain and pain management  - Notify physician/advanced practitioner if interventions unsuccessful or patient reports new pain  Outcome: Progressing     Problem: INFECTION - ADULT  Goal: Absence or prevention of progression during hospitalization  Description: INTERVENTIONS:  - Assess and monitor for signs and symptoms of infection  - Monitor lab/diagnostic results  - Monitor all insertion sites, i e  indwelling lines, tubes, and drains  - Monitor endotracheal if appropriate and nasal secretions for changes in amount and color  - [place] appropriate cooling/warming therapies per order  - Administer medications as ordered  - Instruct and encourage patient and family to use good hand hygiene technique  - Identify and instruct in appropriate isolation precautions for identified infection/condition  Outcome: Progressing     Problem: SAFETY ADULT  Goal: Patient will remain free of falls  Description: INTERVENTIONS:  - Educate patient/family on patient safety including physical limitations  - Instruct patient to call for assistance with activity   - Consult OT/PT to assist with strengthening/mobility   - Keep Call bell within reach  - Keep bed low and locked with side rails adjusted as appropriate  - Keep care items and personal belongings within reach  - Initiate and maintain comfort rounds  - Make Fall Risk Sign visible to staff    Outcome: Progressing  Goal: Maintain or return to baseline ADL function  Description: INTERVENTIONS:  - Educate patient/family on patient safety including physical limitations  - Instruct patient to call for assistance with activity   - Consult OT/PT to assist with strengthening/mobility   - Keep Call bell within reach  - Keep bed low and locked with side rails adjusted as appropriate  - Keep care items and personal belongings within reach  - Initiate and maintain comfort rounds  - Make Fall Risk Sign visible to staff    Outcome: Progressing  Goal: Maintains/Returns to pre admission functional level  Description: INTERVENTIONS:  - Perform BMAT or MOVE assessment daily    - Set and communicate daily mobility goal to care team and patient/family/caregiver  - Collaborate with rehabilitation services on mobility goals if consulted  - Perform Range of Motion 3 times a day  - Reposition patient every 3 hours    - Dangle patient 3 times a day  - Stand patient 3 times a day  - Ambulate patient 3 times a day  - Out of bed to chair 3 times a day   - Out of bed for meals 3 times a day  - Out of bed for toileting  - Record patient progress and toleration of activity level   Outcome: Progressing     Problem: DISCHARGE PLANNING  Goal: Discharge to home or other facility with appropriate resources  Description: INTERVENTIONS:  - Identify barriers to discharge w/patient and caregiver  - Arrange for needed discharge resources and transportation as appropriate  - Identify discharge learning needs (meds, wound care, etc )  - Arrange for interpretive services to assist at discharge as needed  - Refer to Case Management Department for coordinating discharge planning if the patient needs post-hospital services based on physician/advanced practitioner order or complex needs related to functional status, cognitive ability, or social support system  Outcome: Progressing     Problem: Knowledge Deficit  Goal: Patient/family/caregiver demonstrates understanding of disease process, treatment plan, medications, and discharge instructions  Description: Complete learning assessment and assess knowledge base    Interventions:  - Provide teaching at level of understanding  - Provide teaching via preferred learning methods  Outcome: Progressing     Problem: CARDIOVASCULAR - ADULT  Goal: Maintains optimal cardiac output and hemodynamic stability  Description: INTERVENTIONS:  - Monitor I/O, vital signs and rhythm  - Monitor for S/S and trends of decreased cardiac output  - Administer and titrate ordered vasoactive medications to optimize hemodynamic stability  - Assess quality of pulses, skin color and temperature  - Assess for signs of decreased coronary artery perfusion  - Instruct patient to report change in severity of symptoms  Outcome: Progressing  Goal: Absence of cardiac dysrhythmias or at baseline rhythm  Description: INTERVENTIONS:  - Continuous cardiac monitoring, vital signs, obtain 12 lead EKG if ordered  - Administer antiarrhythmic and heart rate control medications as ordered  - Monitor electrolytes and administer replacement therapy as ordered  Outcome: Progressing     Problem: HEMATOLOGIC - ADULT  Goal: Maintains hematologic stability  Description: INTERVENTIONS  - Assess for signs and symptoms of bleeding or hemorrhage  - Monitor labs  - Administer supportive blood products/factors as ordered and appropriate  Outcome: Progressing

## 2022-08-10 NOTE — DISCHARGE SUMMARY
Normocytic anemia  Assessment & Plan  Hemoglobin 10 3 with MCV in the mid 90s  Hemoglobin has been slowly down trending over the past year from 12  Appears to have anemia of chronic dz based on iron studies  No signs of apollo gi bleeding, stable     Enlarged prostate  Assessment & Plan  History of prostatomegaly for which he has seen Urology in the past and they had at 1 point recommended a prostate surgery  Plan  Prostate enlarged on CT scan  Recommend outpt urology f/u     Lymph nodes enlarged  Assessment & Plan  See above for details in JOSE LUIS section     Dyspnea on exertion  Assessment & Plan  As above in lightheadedness section     JOSE LUIS (acute kidney injury) ([facility] [address] )  Assessment & Plan  Improved with hydration, cont hydration per nephrology   Appears patient probably has baseline CKD with creatinine baseline probably around 1 3-1 5     Bladder scan performed with less than 70 cc  Patient has a history of prostatomegaly and patient also underwent a CT scan in October 2021 that was notable for the prostatomegaly as well as notable for stranding of the small bowel mesentery with borderline lymph nodes that was 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 though early lymphoma could present similarly and a recommended follow-up CT in 6 months was recommended at that time but patient has not gotten that performed  Repeat CT:  This is a nonspecific finding which can be idiopathic or related to various disease processes ranging from benign mesenteritis to liver disease to lymphoma   Lack of pathologically enlarged lymph nodes is somewhat reassuring      Will recommend patient to f/u with PCP further        * Lightheadedness  Assessment & Plan  Lightheadness/dyspnea on exertion likely related to dehydration, low blood pressure   Sx improved with hydration  EKG sinus bradycardia, rate 49, first-degree AV block, two second sinus pause, no st elevation  -troponin negative x1; beta blocker held   -BNP elevated at 2200 - but reviewed with nephrology, felt elevated in setting of JOSE LUIS and patient intravascularly depleted  Received IVF 8/10, 8/11 with improvement  XR negative  Echo ok -reviewed with cards felt lyme carditis less likely as hr improved w/ holding beta blocker  apprec cards  All BP meds held, will be discharged on truncated regimen  D dimer negative               Hospital Course:      Altagracia Suazo is a 80 y o  male patient who originally presented to the hospital on       Admission Orders (From admission, onward)              Ordered          08/08/22 2013   INPATIENT ADMISSION  Once                        due to dehydration likely in the setting of blood pressure medication regimen  Patient was seen by Nephrology and had regimen truncated  Patient was also evaluated by Cardiology and felt to be stable from cardiovascular standpoint  Patient feels much better at the time of release from hospital after receiving IV fluids  His creatinine is now baseline  Initial Lyme antibodies were positive, Western blot is pending  It was reviewed by Cardiology and felt to be less likely that he would have any sort of Lyme carditis as his slow heart rates improved with holding beta-blocker  He will follow-up with nephrology following release from the hospital   He should also follow-up with his PCP in regards to abnormal CT of the abdomen lymphadenopathy      Please see above list of diagnoses and related plan for additional information       Physical Exam:     GEN: No acute distress, comfortable  HEEENT: No JVD, PERRLA, no scleral icterus  RESP: Lungs clear to auscultation bilaterally  CV: RRR, +s1/s2   ABD: SOFT NON TENDER, POSITIVE BOWEL SOUNDS, NO DISTENTION  PSYCH: CALM  NEURO: A X O X 3, NO FOCAL DEFICITS  SKIN: NO RASH  EXTREM: NO EDEMA        Condition at Discharge:  good        Discharge instructions/Information to patient and family:   See after visit summary for information provided to patient and family     Provisions for Follow-Up Care:  See after visit summary for information related to follow-up care and any pertinent home health orders        Disposition:      Home        Discharge Statement:  I spent 34 minutes discharging the patient  This time was spent on the day of discharge  I had direct contact with the patient on the day of discharge  Greater than 50% of the total time was spent examining patient, answering all patient questions, arranging and discussing plan of care with patient as well as directly providing post-discharge instructions    Additional time then spent on discharge activities      Discharge Medications:  See after visit summary for reconciled discharge medications provided to patient and family        ** Please Note: This note has been constructed using a voice recognition system **

## 2022-08-10 NOTE — ASSESSMENT & PLAN NOTE
Hemoglobin 10 3 with MCV in the mid 90s  Hemoglobin has been slowly down trending over the past year from 12  Appears to have anemia of chronic dz based on iron studies  No signs of apollo gi bleeding, stable

## 2022-08-11 ENCOUNTER — TELEPHONE (OUTPATIENT)
Dept: NEPHROLOGY | Facility: CLINIC | Age: 72
End: 2022-08-11

## 2022-08-11 ENCOUNTER — TRANSITIONAL CARE MANAGEMENT (OUTPATIENT)
Dept: INTERNAL MEDICINE CLINIC | Facility: CLINIC | Age: 72
End: 2022-08-11

## 2022-08-11 DIAGNOSIS — N17.9 AKI (ACUTE KIDNEY INJURY) (HCC): Primary | ICD-10-CM

## 2022-08-11 NOTE — UTILIZATION REVIEW
Inpatient Admission Authorization Request   NOTIFICATION OF INPATIENT ADMISSION/INPATIENT AUTHORIZATION REQUEST   SERVICING FACILITY:   [address] [place] [address], [facility], [facility]  Tax ID: [id number]  NPI: [id number]  Place of Service: Inpatient [address] [facility]  [address]  Place of Service Code: 24     ATTENDING PROVIDER:  Attending Name and NPI#: Jessica Lynnma [[id number]]  Address: [address] [place] [address], [facility], [facility]  Phone: [phone]     UTILIZATION REVIEW CONTACT:  Linden Bello Utilization   Network Utilization Review Department  Phone: [phone]  Fax: [phone]  Email: [email or web address]     PHYSICIAN ADVISORY SERVICES:  FOR CMVU-MJ-LSRR REVIEW - MEDICAL NECESSITY DENIAL  Phone: [phone]  Fax: [phone]  Email: [email or web address]     TYPE OF REQUEST:  Inpatient Status     ADMISSION INFORMATION:  ADMISSION DATE/TIME: 8/8/22  8:13 PM  PATIENT DIAGNOSIS CODE/DESCRIPTION:  Shortness of breath [R06 02]  Dyspnea on exertion [R06 00]  JOSE LUIS (acute kidney injury) ([facility] [address] ) [N17 9]  Elevated brain natriuretic peptide (BNP) level [R79 89]  DISCHARGE DATE/TIME: 8/10/2022  4:38 PM   IMPORTANT INFORMATION:  Please contact Linden Bello directly with any questions or concerns regarding this request  Department voicemails are confidential     Send requests for admission clinical reviews, concurrent reviews, approvals, and administrative denials due to lack of clinical to fax [phone]

## 2022-08-11 NOTE — TELEPHONE ENCOUNTER
----- Message from Danuta Sierra sent at 8/11/2022  7:44 AM EDT -----  Please schedule JOSE LUIS follow up with BMP prior to the appointment       Thanks

## 2022-08-12 ENCOUNTER — OFFICE VISIT (OUTPATIENT)
Dept: INTERNAL MEDICINE CLINIC | Facility: CLINIC | Age: 72
End: 2022-08-12
Payer: COMMERCIAL

## 2022-08-12 VITALS
SYSTOLIC BLOOD PRESSURE: 122 MMHG | DIASTOLIC BLOOD PRESSURE: 64 MMHG | BODY MASS INDEX: 37.13 KG/M2 | HEART RATE: 61 BPM | TEMPERATURE: 98 F | HEIGHT: 68 IN | OXYGEN SATURATION: 98 % | WEIGHT: 245 LBS

## 2022-08-12 DIAGNOSIS — I10 ESSENTIAL HYPERTENSION: ICD-10-CM

## 2022-08-12 DIAGNOSIS — E55.9 VITAMIN D DEFICIENCY: ICD-10-CM

## 2022-08-12 DIAGNOSIS — I10 HYPERTENSION, UNSPECIFIED TYPE: ICD-10-CM

## 2022-08-12 DIAGNOSIS — R73.09 ELEVATED HEMOGLOBIN A1C: ICD-10-CM

## 2022-08-12 DIAGNOSIS — D64.9 ANEMIA, UNSPECIFIED TYPE: ICD-10-CM

## 2022-08-12 DIAGNOSIS — R93.89 ABNORMAL CT SCAN: ICD-10-CM

## 2022-08-12 DIAGNOSIS — R76.8 POSITIVE LYME DISEASE SEROLOGY: Primary | ICD-10-CM

## 2022-08-12 PROCEDURE — 99496 TRANSJ CARE MGMT HIGH F2F 7D: CPT | Performed by: INTERNAL MEDICINE

## 2022-08-12 RX ORDER — HYDROCHLOROTHIAZIDE 12.5 MG/1
12.5 TABLET ORAL DAILY PRN
Qty: 30 TABLET | Refills: 0 | Status: SHIPPED | OUTPATIENT
Start: 2022-08-12 | End: 2022-09-09

## 2022-08-12 RX ORDER — DOXYCYCLINE HYCLATE 100 MG/1
100 CAPSULE ORAL EVERY 12 HOURS SCHEDULED
Qty: 28 CAPSULE | Refills: 0 | Status: SHIPPED | OUTPATIENT
Start: 2022-08-12 | End: 2022-08-26

## 2022-08-12 RX ORDER — HYDROCHLOROTHIAZIDE 25 MG/1
25 TABLET ORAL DAILY PRN
Qty: 30 TABLET
Start: 2022-08-12 | End: 2022-08-12 | Stop reason: SDUPTHER

## 2022-08-12 RX ORDER — HYDROCHLOROTHIAZIDE 25 MG/1
12.5 TABLET ORAL DAILY PRN
Qty: 30 TABLET
Start: 2022-08-12 | End: 2022-08-12

## 2022-08-12 NOTE — TELEPHONE ENCOUNTER
Spoke with Johana Godinez and schedule appointment for 11/3 with Esme Escobedo in the [facility] location   Also on the wait list

## 2022-08-12 NOTE — PROGRESS NOTES
INTERNAL MEDICINE OFFICE VISIT  [facility] [place]    NAME: Keiry Ramirez  AGE: 80 y o  SEX: male    DATE OF ENCOUNTER: 8/14/2022    Assessment and Plan     1  Positive Lyme disease serology  -hospital course as below  Patient is overall feeling better, though still not quite back to his baseline  His Lyme antibody and Western blot checked in the hospital has come back positive  Unclear if his presenting symptoms were related to Lyme disease  Per Cardiology, lower suspicion for Lyme carditis as the patient's heart rate improved with discontinuation of beta-blocker  -will treat with a 2 week course of doxycycline  Counseled patient on common side effects including esophagitis, photosensitivity      - doxycycline hyclate (VIBRAMYCIN) 100 mg capsule; Take 1 capsule (100 mg total) by mouth every 12 (twelve) hours for 14 days  Dispense: 28 capsule; Refill: 0    2  Vitamin D deficiency  - Vitamin D 25 hydroxy; Future    3  Elevated hemoglobin A1c  -most recent A1c of 6 5, in the diabetic range  -will not start any additional medications today  Counseled on healthy dietary and lifestyle choices, moderation of carbohydrate and sugar intake  -recheck A1c prior to follow-up    - Hemoglobin A1C; Future    4  Abnormal CT scan  -CT of the abdomen and pelvis completed 8/9 shows "Hazy infiltration of the central mesenteric fat with interspersed prominent but not pathologically enlarged lymph nodes, unchanged from 10/14/2021  This is a nonspecific finding which can be idiopathic or related to various disease processes ranging from benign mesenteritis to liver disease to lymphoma  Lack of pathologically enlarged lymph nodes is somewhat reassuring "  -the significance of this finding is unclear  We are going to get a repeat CT scan in 3 months to monitor for any changes and try to get some clarity on findings      - CT abdomen pelvis wo contrast; Future    5   Anemia, unspecified type  -continue to monitor, recheck CBC and iron panel, peripheral smear, retic count with next set of labs  - CBC and differential; Future  - Comprehensive metabolic panel; Future  - Peripheral Smear; Future  - Retic Count with Reticulocyte HGB; Future  - Iron Panel (Includes Ferritin, Iron Sat%, Iron, and TIBC); Future    6  Hypertension, unspecified type    7  Essential hypertension  -spironolactone, HCTZ were discontinued in the hospital   He was given p r n  Parameters to continue HCTZ for weight gain, spironolactone for elevated blood pressure  -patient should continue to closely monitor blood pressure at home  -to simplify things, I suggested he should just try a lower dose of hydrochlorothiazide if needed if he develops edema or elevated blood pressures    - hydrochlorothiazide (HYDRODIURIL) 12 5 mg tablet; Take 1 tablet (12 5 mg total) by mouth daily as needed (if weight > 244 lbs)  Dispense: 30 tablet; Refill: 0         BMI Counseling: Body mass index is 37 25 kg/m²  The BMI is above normal  Nutrition recommendations include decreasing portion sizes, encouraging healthy choices of fruits and vegetables, moderation in carbohydrate intake and increasing intake of lean protein  Exercise recommendations include moderate physical activity 150 minutes/week  Rationale for BMI follow-up plan is due to patient being overweight or obese  Depression Screening and Follow-up Plan: Patient was screened for depression during today's encounter  They screened negative with a PHQ-2 score of 0           Orders Placed This Encounter   Procedures    CT abdomen pelvis wo contrast    Hemoglobin A1C    Vitamin D 25 hydroxy    CBC and differential    Comprehensive metabolic panel    Peripheral Smear    Retic Count with Reticulocyte HGB       Chief Complaint     Chief Complaint   Patient presents with    Transition of Care Management     D/C from [facility] 8[facility]10 [facility] Call     Date and time call was made  8/11/2022 10:48 AM    Patient was hospitialized at  [address]    Date of Admission  08/08/22    Date of discharge  08/10/22    Diagnosis  Lightheadedness    Disposition  Home    Current Symptoms  None      TCM Call     Post hospital issues  None    Should patient be enrolled in anticoag monitoring? No    Scheduled for follow up? Yes    Did you obtain your prescribed medications  Yes    Do you need help managing your prescriptions or medications  No    Is transportation to your appointment needed  No    I have advised the patient to call PCP with any new or worsening symptoms  Alba Davalos ([place])           History of Present Illness     Here today for TCM appointment  He has a medical history of hypertension, BPH, prediabetes    Initially presented to the hospital with symptoms of near syncope, dyspnea on exertion  Found to have acute kidney injury  He was seen by both cardiology and nephrology services  Kidney injury likely prerenal etiology  Blood pressure regimen was down titrated  No further workup recommended from a cardiac standpoint  Creatinine returned to baseline with IV fluid resuscitation and patient improved clinically  Initial Lyme antibodies positive    Patient improved clinically and was deemed stable for discharge      The following portions of the patient's history were reviewed and updated as appropriate: allergies, current medications, past family history, past medical history, past social history, past surgical history and problem list     Review of Systems     10 point ROS negative except per HPI    Active Problem List     Patient Active Problem List   Diagnosis    Primary osteoarthritis of right knee    Benign prostatic hyperplasia    Glaucoma    Hypertension    Hyperlipidemia    Prediabetes    Nocturia    Rash    Cough    COVID-19 virus infection    Class 2 severe obesity due to excess calories with serious comorbidity and body mass index (BMI) of 35 0 to 35 9 in [facility])    Right inguinal pain    Obesity (BMI 30-39  9)    Ocular hypertension of left eye    Lightheadedness    JOSE LUIS (acute kidney injury) (HCC)    Dyspnea on exertion    Lymph nodes enlarged    Enlarged prostate    Normocytic anemia       Objective     /64 (BP Location: Left arm, Patient Position: Sitting, Cuff Size: Large)   Pulse 61   Temp 98 °F (36 7 °C)   Ht 5' 8" (1 727 m)   Wt 111 kg (245 lb)   SpO2 98%   BMI 37 25 kg/m²     Physical Exam  Constitutional:       Appearance: Normal appearance  He is not ill-appearing  HENT:      Head: Normocephalic and atraumatic  Eyes:      General: No scleral icterus  Right eye: No discharge  Left eye: No discharge  Cardiovascular:      Rate and Rhythm: Normal rate and regular rhythm  Heart sounds: No murmur heard  No friction rub  Pulmonary:      Effort: Pulmonary effort is normal       Breath sounds: Normal breath sounds  No wheezing or rales  Abdominal:      General: Abdomen is flat  There is no distension  Palpations: Abdomen is soft  Tenderness: There is no abdominal tenderness  Musculoskeletal:         General: No swelling or tenderness  Skin:     General: Skin is warm and dry  Findings: No erythema  Neurological:      Mental Status: He is alert  Mental status is at baseline  Motor: No weakness  Psychiatric:         Mood and Affect: Mood normal          Behavior: Behavior normal          Pertinent Laboratory/Diagnostic Studies:  CT abdomen pelvis wo contrast    Result Date: 8/9/2022  Impression: No evidence of acute intra-abdominal or pelvic pathology Workstation performed: [id number]     XR chest 1 view portable    Result Date: 8/9/2022  Impression: No acute cardiopulmonary disease   Workstation performed: [id number]       Images and diagnostics reviewed     Current Medications     Current Outpatient Medications:     Ascorbic Acid (vitamin C) 1000 MG tablet, Take 1,000 mg by mouth daily, Disp: , Rfl:    cholecalciferol (VITAMIN D3) 1,000 units tablet, Take 1,000 Units by mouth daily, Disp: , Rfl:     doxycycline hyclate (VIBRAMYCIN) 100 mg capsule, Take 1 capsule (100 mg total) by mouth every 12 (twelve) hours for 14 days, Disp: 28 capsule, Rfl: 0    hydrochlorothiazide (HYDRODIURIL) 12 5 mg tablet, Take 1 tablet (12 5 mg total) by mouth daily as needed (if weight > 244 lbs), Disp: 30 tablet, Rfl: 0    losartan (COZAAR) 100 MG tablet, Take 0 5 tablets (50 mg total) by mouth daily, Disp: 90 tablet, Rfl: 0    metoprolol succinate (TOPROL-XL) 50 mg 24 hr tablet, Take 0 5 tablets (25 mg total) by mouth daily, Disp: 90 tablet, Rfl: 0    Multiple Vitamin (MULTI-VITAMIN DAILY PO), Take 1 tablet by mouth daily, Disp: , Rfl:     ROCKLATAN 0 02-0 005 % SOLN, , Disp: , Rfl: 4    tamsulosin (FLOMAX) 0 4 mg, Take 1 capsule (0 4 mg total) by mouth daily at bedtime, Disp: 90 capsule, Rfl: 3    Health Maintenance     Health Maintenance   Topic Date Due    Fall Risk  03/11/2022    Annual Physical  06/15/2022    Influenza Vaccine (1) 09/01/2022    Depression Screening  08/12/2023    BMI: Adult  08/12/2023    BMI: Followup Plan  08/14/2023    Colorectal Cancer Screening  01/10/2029    DTaP,Tdap,and Td Vaccines (3 - Td or Tdap) 06/05/2031    Hepatitis C Screening  Completed    Pneumococcal Vaccine: 65+ Years  Completed    COVID-19 Vaccine  Completed    HIB Vaccine  Aged Out    Hepatitis B Vaccine  Aged Out    IPV Vaccine  Aged Out    Hepatitis A Vaccine  Aged Out    Meningococcal ACWY Vaccine  Aged Out    HPV Vaccine  Aged Out     Immunization History   Administered Date(s) Administered    COVID-19 MODERNA VACC 0 5 ML IM 02/18/2021, 03/18/2021, 11/19/2021, 07/15/2022    Pneumococcal Conjugate 13-Valent 11/20/2018    Pneumococcal Polysaccharide PPV23 06/15/2021    Tdap 01/01/2014, 06/05/2021       NATALIE Schmitz  Internal Medicine [facility]  [address]  [place], [place] [address]  Office: [phone]  Fax: [phone]

## 2022-09-06 ENCOUNTER — APPOINTMENT (OUTPATIENT)
Dept: LAB | Facility: MEDICAL CENTER | Age: 72
End: 2022-09-06
Payer: COMMERCIAL

## 2022-09-06 DIAGNOSIS — N17.9 AKI (ACUTE KIDNEY INJURY) (HCC): ICD-10-CM

## 2022-09-06 LAB
ANION GAP SERPL CALCULATED.3IONS-SCNC: 5 MMOL/L (ref 4–13)
BUN SERPL-MCNC: 17 MG/DL (ref 5–25)
CALCIUM SERPL-MCNC: 8.9 MG/DL (ref 8.3–10.1)
CHLORIDE SERPL-SCNC: 107 MMOL/L (ref 96–108)
CO2 SERPL-SCNC: 25 MMOL/L (ref 21–32)
CREAT SERPL-MCNC: 1.2 MG/DL (ref 0.6–1.3)
GFR SERPL CREATININE-BSD FRML MDRD: 60 ML/MIN/1.73SQ M
GLUCOSE P FAST SERPL-MCNC: 120 MG/DL (ref 65–99)
POTASSIUM SERPL-SCNC: 4.6 MMOL/L (ref 3.5–5.3)
SODIUM SERPL-SCNC: 137 MMOL/L (ref 135–147)

## 2022-09-06 PROCEDURE — 80048 BASIC METABOLIC PNL TOTAL CA: CPT

## 2022-09-06 PROCEDURE — 36415 COLL VENOUS BLD VENIPUNCTURE: CPT

## 2022-09-09 ENCOUNTER — OFFICE VISIT (OUTPATIENT)
Dept: NEPHROLOGY | Facility: CLINIC | Age: 72
End: 2022-09-09
Payer: COMMERCIAL

## 2022-09-09 VITALS
DIASTOLIC BLOOD PRESSURE: 64 MMHG | WEIGHT: 244 LBS | HEIGHT: 68 IN | SYSTOLIC BLOOD PRESSURE: 126 MMHG | BODY MASS INDEX: 36.98 KG/M2 | HEART RATE: 72 BPM

## 2022-09-09 DIAGNOSIS — I10 PRIMARY HYPERTENSION: ICD-10-CM

## 2022-09-09 DIAGNOSIS — N17.9 AKI (ACUTE KIDNEY INJURY) (HCC): ICD-10-CM

## 2022-09-09 DIAGNOSIS — N18.2 STAGE 2 CHRONIC KIDNEY DISEASE: Primary | ICD-10-CM

## 2022-09-09 PROCEDURE — 99213 OFFICE O/P EST LOW 20 MIN: CPT | Performed by: INTERNAL MEDICINE

## 2022-09-09 RX ORDER — SPIRONOLACTONE 25 MG/1
25 TABLET ORAL DAILY
COMMUNITY
End: 2022-09-09

## 2022-09-09 RX ORDER — LOSARTAN POTASSIUM 50 MG/1
50 TABLET ORAL DAILY
Qty: 30 TABLET | Refills: 6 | Status: SHIPPED | OUTPATIENT
Start: 2022-09-09

## 2022-09-09 RX ORDER — CHLORTHALIDONE 25 MG/1
12.5 TABLET ORAL DAILY
Qty: 15 TABLET | Refills: 6 | Status: SHIPPED | OUTPATIENT
Start: 2022-09-09 | End: 2022-10-09

## 2022-09-09 RX ORDER — METOPROLOL SUCCINATE 25 MG/1
25 TABLET, EXTENDED RELEASE ORAL DAILY
Qty: 30 TABLET | Refills: 6 | Status: SHIPPED | OUTPATIENT
Start: 2022-09-09

## 2022-09-09 NOTE — PROGRESS NOTES
NEPHROLOGY OFFICE VISIT   Catherine Reyes 80 y o  male MRN: [id number]  9/9/2022    Reason for Visit: Follow up JOSE LUIS     ASSESSMENT and PLAN:  It was a pleasure evaluating your patient in the office today  Thank you for allowing our team to participate in the care of Mr Catherine Reyes  Please do not hesitate to contact our team if further issues/questions shall arise in the interim       # Acute kidney injury  - Hospital admission 08/09/2022-08/10/2022  - Creatinine on admission 2 24, creatinine on discharge 1 44, creatinine 09/2022 1 20  - Prerenal azotemia in setting of decreased renal perfusion from volume depletion and hypotension  - This has currently resolved status post IV fluid administration in the hospital and adjustment of antihypertensive regimen    # Chronic Kidney Disease Stage II/III-A   - Etiology/risk factors: Hypertension, prediabetes and obstructive uropathy   - Baseline Kidney Function:  Baseline serum creatinine appears to be around 1 0-1 2  - Urinalysis: [place]  - Proteinuria: UACR/UPCR: No proteinuria  - Imaging: Right renal cyst, no hydronephrosis  - Serologies: Not indicated  - Renal function has improved to baseline  - Discussed good blood pressure control to retard progression of chronic kidney disease   - Discussed follow-up with Urology regarding management of BPH  - Recommended to avoid NSAIDs and the list of commonly used/prescribed NSAIDs was provided  - Follow-up in 6 months    # Hypertension/Volume   - Target Goal: Less than 120/80 per KDIGO guidelines   - Bilateral lower extremity pitting edema present  - Status: Blood pressure 130-150 at home   - Current antihypertensive regimen: Losartan 50 mg daily, Toprol-XL 25 mg daily, spironolactone 25 mg daily  - Changes: Start chlorthalidone 12 5 mg daily and hold spironolactone   - We can add spironolactone later if blood pressure remains elevated despite above regimen  BP Readings from Last 3 Encounters:   09/09/22 126/64   08/12/22 122/64 08/10/22 132/76      # Anemia   - Target Hb: More than 10 g/dL  - Status: At goal     # Electrolytes/Acid Base status   - No electrolyte or acid-base disturbance    # BPH   - Currently on tamsulosin but still having nocturia and weak stream   - He will follow-up with Urology    # Lyme disease   - Status post 2 week course of oral doxycycline     HPI:  80-year-old male with history of hypertension for 40 years, prediabetes and BPH  He was seen in the hospital in August for acute kidney injury  He was admitted with hypotension and elevated creatinine  His admission creatinine was 2 24 that improved with IV fluid hydration and adjustment of antihypertensive regimen  Before admission to the hospital he was on Toprol-XL 50 mg daily, losartan 100 mg daily, amlodipine 10 mg daily, hydrochlorothiazide 25 mg daily and spironolactone 25 mg daily  At discharge from the hospital he was advised to stop amlodipine, hydrochlorothiazide and spironolactone and continue decreased dose of losartan at 50 mg daily and Toprol-XL 25 mg daily  Since discharge he has felt really well  His blood pressure started going up  His systolic blood pressure at home was running between 130-150  He resumed spironolactone 25 mg daily  He also saw his primary care physician for positive Lyme serology and was treated with 2 weeks of doxycycline  Currently he has lower extremity swelling  Denies shortness of breath  Denies chest pain  Denies nausea or vomiting  Denies abdominal pain  He has a weak urinary stream and has nocturia which is chronic     >> Major risk factors for CKD  - Diabetes: Pre diabetes, not on medications   - Hypertension: Yes for 36 years   - Age ?  55 years: Yes   - Family history of kidney disease: No   - Obesity or metabolic syndrome: Yes     >> Medical history evaluation   - Prior kidney disease or dialysis: JOSE LUIS in 08/2022  - Incidental albuminuria or hematuria in the past: No   - Urinary symptoms: Nocturia 3-4 times, stream is weak   - History of foamy or frothy urine: No   - History of nephrolithiasis: No   - Diseases that share risk factors with CKD: DM, HTN  - Systemic diseases that might affect kidney: No   - History of use of medications that might affect renal function: No     PATIENT INSTRUCTIONS:    Patient Instructions     ~ Please AVOID the following pain medications  LIST OF NSAIDS (NONSTEROIDAL ANTI-INFLAMMATORY DRUGS) AND VERA-2 INHIBITORS    DIFLUNISAL (DOLOBID)  IBUPROFEN (MOTRIN, ADVIL)  FLURBIPROFEN (ANSAID)  KETOPROFEN (ORUDIS, ORUVAIL)  FENOPROFEN (NALFON)  NABUMETONE (RELAFEN)  PIROXICAM (FELDENE)  NAPROXEN (ALEVE, NAPROSYN, NAPRELAN, ANAPROX)  DICLOFENAC (VOLTAREN, CATAFLAM)  INDOMETHACIN (INDOCIN)  SULINDAC (CLINORIL)  TOLMETIN (TOLETIN)  ETODOLAC (LODINE)  MELOXICAM (MOBIC)  KETOROLAC (TORADOL)  OXAPROZIN (DAYPRO)  CELECOXIB (CELEBREX)          OBJECTIVE:  Current Weight: Weight - Scale: 111 kg (244 lb)  Vitals:    09/09/22 1253   BP: 126/64   BP Location: Left arm   Patient Position: Sitting   Cuff Size: Large   Pulse: 72   Weight: 111 kg (244 lb)   Height: 5' 8" (1 727 m)    Body mass index is 37 1 kg/m²  REVIEW OF SYSTEMS:    Review of Systems   Constitutional: Negative for chills and fever  HENT: Negative for ear pain and sore throat  Eyes: Negative for pain and visual disturbance  Respiratory: Negative for cough and shortness of breath  Cardiovascular: Positive for leg swelling  Negative for chest pain and palpitations  Gastrointestinal: Negative for abdominal pain and vomiting  Genitourinary: Negative for dysuria and hematuria  Musculoskeletal: Negative for arthralgias and back pain  Skin: Negative for color change and rash  Neurological: Negative for seizures and syncope  All other systems reviewed and are negative  PHYSICAL EXAM:      Physical Exam  Constitutional:       Appearance: Normal appearance  HENT:      Head: Normocephalic and atraumatic  Mouth/Throat:      Mouth: Mucous membranes are moist       Pharynx: Oropharynx is clear  Cardiovascular:      Rate and Rhythm: Normal rate and regular rhythm  Pulses: Normal pulses  Heart sounds: Normal heart sounds  Pulmonary:      Effort: Pulmonary effort is normal       Breath sounds: Normal breath sounds  Abdominal:      General: Abdomen is flat  Bowel sounds are normal       Palpations: Abdomen is soft  Musculoskeletal:         General: Normal range of motion  Right lower leg: Edema present  Left lower leg: Edema present  Skin:     General: Skin is warm and dry  Neurological:      General: No focal deficit present  Mental Status: He is alert and oriented to person, place, and time  Mental status is at baseline  Psychiatric:         Mood and Affect: Mood normal          Behavior: Behavior normal          Thought Content:  Thought content normal          Judgment: Judgment normal          Medications:    Current Outpatient Medications:     Ascorbic Acid (vitamin C) 1000 MG tablet, Take 1,000 mg by mouth daily, Disp: , Rfl:     chlorthalidone 25 mg tablet, Take 0 5 tablets (12 5 mg total) by mouth daily, Disp: 15 tablet, Rfl: 6    losartan (COZAAR) 50 mg tablet, Take 1 tablet (50 mg total) by mouth daily, Disp: 30 tablet, Rfl: 6    metoprolol succinate (TOPROL-XL) 25 mg 24 hr tablet, Take 1 tablet (25 mg total) by mouth daily, Disp: 30 tablet, Rfl: 6    Multiple Vitamin (MULTI-VITAMIN DAILY PO), Take 1 tablet by mouth daily, Disp: , Rfl:     ROCKLATAN 0 02-0 005 % SOLN, , Disp: , Rfl: 4    tamsulosin (FLOMAX) 0 4 mg, Take 1 capsule (0 4 mg total) by mouth daily at bedtime, Disp: 90 capsule, Rfl: 3    cholecalciferol (VITAMIN D3) 1,000 units tablet, Take 1,000 Units by mouth daily (Patient not taking: Reported on 9/9/2022), Disp: , Rfl:     Laboratory Results:  Results from last 7 days   Lab Units 09/06/22  0936   POTASSIUM mmol/L 4 6   CHLORIDE mmol/L 107   CO2 mmol/L 25   BUN mg/dL 17   CREATININE mg/dL 1 20   CALCIUM mg/dL 8 9       Results for orders placed or performed in visit on 51/73/14   Basic metabolic panel   Result Value Ref Range    Sodium 137 135 - 147 mmol/L    Potassium 4 6 3 5 - 5 3 mmol/L    Chloride 107 96 - 108 mmol/L    CO2 25 21 - 32 mmol/L    ANION GAP 5 4 - 13 mmol/L    BUN 17 5 - 25 mg/dL    Creatinine 1 20 0 60 - 1 30 mg/dL    Glucose, Fasting 120 (H) 65 - 99 mg/dL    Calcium 8 9 8 3 - 10 1 mg/dL    eGFR 60 ml/min/1 73sq m

## 2022-09-09 NOTE — PATIENT INSTRUCTIONS
~ Please AVOID the following pain medications    LIST OF NSAIDS (NONSTEROIDAL ANTI-INFLAMMATORY DRUGS) AND VERA-2 INHIBITORS    DIFLUNISAL (DOLOBID)  IBUPROFEN (MOTRIN, ADVIL)  FLURBIPROFEN (ANSAID)  KETOPROFEN (ORUDIS, ORUVAIL)  FENOPROFEN (NALFON)  NABUMETONE (RELAFEN)  PIROXICAM (FELDENE)  NAPROXEN (ALEVE, NAPROSYN, NAPRELAN, ANAPROX)  DICLOFENAC (VOLTAREN, CATAFLAM)  INDOMETHACIN (INDOCIN)  SULINDAC (CLINORIL)  TOLMETIN (TOLETIN)  ETODOLAC (LODINE)  MELOXICAM (MOBIC)  KETOROLAC (TORADOL)  OXAPROZIN (DAYPRO)  CELECOXIB (CELEBREX)

## 2022-09-30 ENCOUNTER — OFFICE VISIT (OUTPATIENT)
Dept: CARDIOLOGY CLINIC | Facility: CLINIC | Age: 72
End: 2022-09-30
Payer: COMMERCIAL

## 2022-09-30 VITALS
HEART RATE: 61 BPM | BODY MASS INDEX: 37.43 KG/M2 | WEIGHT: 246.2 LBS | SYSTOLIC BLOOD PRESSURE: 130 MMHG | DIASTOLIC BLOOD PRESSURE: 70 MMHG

## 2022-09-30 DIAGNOSIS — R42 LIGHTHEADEDNESS: ICD-10-CM

## 2022-09-30 DIAGNOSIS — I10 PRIMARY HYPERTENSION: Primary | ICD-10-CM

## 2022-09-30 DIAGNOSIS — R06.09 DYSPNEA ON EXERTION: ICD-10-CM

## 2022-09-30 DIAGNOSIS — R94.31 ABNORMAL ELECTROCARDIOGRAM (ECG) (EKG): ICD-10-CM

## 2022-09-30 PROCEDURE — 93000 ELECTROCARDIOGRAM COMPLETE: CPT | Performed by: INTERNAL MEDICINE

## 2022-09-30 PROCEDURE — 99203 OFFICE O/P NEW LOW 30 MIN: CPT | Performed by: INTERNAL MEDICINE

## 2022-09-30 NOTE — PROGRESS NOTES
Cardiology Follow Up    Robbie Suero  1950  [id number]  [address] [facility] [address] [phone]    1  Primary hypertension  POCT ECG   2  Dyspnea on exertion     3  Lightheadedness     4  Abnormal electrocardiogram (ECG) (EKG)         Interval History:  Cardiology consultation, multiple records reviewed, imaging was reviewed available  Pleasant 71-year-old male who has no previous cardiac history, longstanding his hypertension for over 4 decades previously on 5 medications for blood pressure control, the patient was admitted to the hospital last month  He does have low blood pressure and bradycardia  Was anemic and it had kidney disease he has improved with hydration, he was found to be bradycardiac and his beta-blocker was increased  He went to have a Lyme test that was positive by Western blot analysis  And received antibiotic therapy which he finished, the patient's symptoms have gradually abating, he currently denies any chest pain significant dyspnea, no syncope or presyncope  Several blood pressure medications were discontinued during his hospitalization and is noted blood pressure is starting to,  Most recent 150/70, today's 130/70  Patient is concerned about possible long-term ill effects of Lyme disease  Patient Active Problem List   Diagnosis    Primary osteoarthritis of right knee    Benign prostatic hyperplasia    Glaucoma    Hypertension    Hyperlipidemia    Prediabetes    Nocturia    Rash    Cough    COVID-19 virus infection    Class 2 severe obesity due to excess calories with serious comorbidity and body mass index (BMI) of 35 0 to 35 9 in [facility])    Right inguinal pain    Obesity (BMI 30-39  9)    Ocular hypertension of left eye    Lightheadedness    [facility] (acute kidney injury) ([facility] [address] )    Dyspnea on exertion    Lymph nodes enlarged    Enlarged prostate    Normocytic anemia    Abnormal electrocardiogram (ECG) (EKG)     Past Medical History:   Diagnosis Date    Exposure to hepatitis     treated with medication    Hearing loss of aging     History of blood transfusion     as a child    History of total left knee replacement     HL (hearing loss)     Hx of exposure to tuberculosis     treated in past with meds    Hyperlipidemia     Hypertension     OA (osteoarthritis)     bea  knees    Ocular hypertension of left eye     Tinnitus     Use of cane as ambulatory aid     Wears glasses      Social History     Socioeconomic History    Marital status: /Civil Union     Spouse name: Not on file    Number of children: Not on file    Years of education: Not on file    Highest education level: Not on file   Occupational History    Not on file   Tobacco Use    Smoking status: Former Smoker     Quit date: 1972     Years since quittin 3    Smokeless tobacco: Never Used    Tobacco comment: per [employer]:  Never a smoker   Vaping Use    Vaping Use: Never used   Substance and Sexual Activity    Alcohol use: No    Drug use: No    Sexual activity: Not on file   Other Topics Concern    Not on file   Social History Narrative    Not on file     Social Determinants of Health     Financial Resource Strain: Not on file   Food Insecurity: Not on file   Transportation Needs: Not on file   Physical Activity: Not on file   Stress: Not on file   Social Connections: Not on file   Intimate Partner Violence: Not on file   Housing Stability: Not on file      Family History   Problem Relation Age of Onset    Diabetes Mother     COPD Father     Cancer Father         Gastric    Cancer Paternal Uncle         Gastric    Breast cancer Family     Thyroid cancer Family      Past Surgical History:   Procedure Laterality Date    ADENOIDECTOMY      CATARACT EXTRACTION Bilateral     COLONOSCOPY      JOINT REPLACEMENT Left     knee    AL TOTAL KNEE ARTHROPLASTY Left 7/14/2017    Procedure: ARTHROPLASTY KNEE TOTAL;  Surgeon: Harish Campa MD;  [facility];  Service: Orthopedics    LA TOTAL KNEE ARTHROPLASTY Right 9/5/2017    Procedure: ARTHROPLASTY KNEE TOTAL;  Surgeon: Harish Campa MD;  [facility];  Service: Orthopedics    TONSILLECTOMY      WISDOM TOOTH EXTRACTION         Current Outpatient Medications:     Ascorbic Acid (vitamin C) 1000 MG tablet, Take 1,000 mg by mouth daily, Disp: , Rfl:     chlorthalidone 25 mg tablet, Take 0 5 tablets (12 5 mg total) by mouth daily, Disp: 15 tablet, Rfl: 6    losartan (COZAAR) 50 mg tablet, Take 1 tablet (50 mg total) by mouth daily, Disp: 30 tablet, Rfl: 6    metoprolol succinate (TOPROL-XL) 25 mg 24 hr tablet, Take 1 tablet (25 mg total) by mouth daily, Disp: 30 tablet, Rfl: 6    Multiple Vitamin (MULTI-VITAMIN DAILY PO), Take 1 tablet by mouth daily, Disp: , Rfl:     ROCKLATAN 0 02-0 005 % SOLN, , Disp: , Rfl: 4    tamsulosin (FLOMAX) 0 4 mg, Take 1 capsule (0 4 mg total) by mouth daily at bedtime, Disp: 90 capsule, Rfl: 3    cholecalciferol (VITAMIN D3) 1,000 units tablet, Take 1,000 Units by mouth daily (Patient not taking: No sig reported), Disp: , Rfl:   Allergies   Allergen Reactions    Celebrex [Celecoxib] GI Intolerance     Nausea - pt able to tolerate with Prevacid    Influenza Vaccines        "I get the flu or feel horrible for months"       Labs:  Appointment on 09/06/2022   Component Date Value    Sodium 09/06/2022 137     Potassium 09/06/2022 4 6     Chloride 09/06/2022 107     CO2 09/06/2022 25     ANION GAP 09/06/2022 5     BUN 09/06/2022 17     Creatinine 09/06/2022 1 20     Glucose, Fasting 09/06/2022 120 (A)    Calcium 09/06/2022 8 9     eGFR 09/06/2022 60    No results displayed because visit has over 200 results        Appointment on 08/06/2022   Component Date Value    WBC 08/06/2022 10 19 (A)    RBC 08/06/2022 3 69 (A)    Hemoglobin 08/06/2022 11 4 (A)    Hematocrit 08/06/2022 35 2 (A)    MCV 08/06/2022 95     MCH 08/06/2022 30 9     MCHC 08/06/2022 32 4     RDW 08/06/2022 12 4     MPV 08/06/2022 11 5     Platelets 94/72/1631 316     nRBC 08/06/2022 0     Neutrophils Relative 08/06/2022 77 (A)    Immat GRANS % 08/06/2022 1     Lymphocytes Relative 08/06/2022 14     Monocytes Relative 08/06/2022 7     Eosinophils Relative 08/06/2022 1     Basophils Relative 08/06/2022 0     Neutrophils Absolute 08/06/2022 7 95 (A)    Immature Grans Absolute 08/06/2022 0 05     Lymphocytes Absolute 08/06/2022 1 43     Monocytes Absolute 08/06/2022 0 67     Eosinophils Absolute 08/06/2022 0 06     Basophils Absolute 08/06/2022 0 03     Sodium 08/06/2022 134 (A)    Potassium 08/06/2022 4 7     Chloride 08/06/2022 104     CO2 08/06/2022 26     ANION GAP 08/06/2022 4     BUN 08/06/2022 29 (A)    Creatinine 08/06/2022 1 57 (A)    Glucose, Fasting 08/06/2022 131 (A)    Calcium 08/06/2022 9 4     Corrected Calcium 08/06/2022 9 9     AST 08/06/2022 22     ALT 08/06/2022 30     Alkaline Phosphatase 08/06/2022 133 (A)    Total Protein 08/06/2022 8 0     Albumin 08/06/2022 3 4 (A)    Total Bilirubin 08/06/2022 0 63     eGFR 08/06/2022 44     Cholesterol 08/06/2022 119     Triglycerides 08/06/2022 190 (A)    HDL, Direct 08/06/2022 22 (A)    LDL Calculated 08/06/2022 59     Non-HDL-Chol (CHOL-HDL) 08/06/2022 97     Hemoglobin A1C 08/06/2022 6 5 (A)    EAG 08/06/2022 140     TSH 3RD GENERATON 08/06/2022 1 410     PSA 08/06/2022 1 0    Office Visit on 04/14/2022   Component Date Value    SARS-CoV-2 04/14/2022 Negative     INFLUENZA A PCR 04/14/2022 Negative     INFLUENZA B PCR 04/14/2022 Negative      Imaging: No results found  Review of Systems:  Review of Systems   Constitutional: Positive for fatigue and unexpected weight change  Negative for activity change  HENT: Negative for hearing loss and nosebleeds      Eyes: Negative for visual disturbance  Respiratory: Negative for apnea, shortness of breath, wheezing and stridor  Cardiovascular: Negative for chest pain, palpitations and leg swelling  Gastrointestinal: Negative for abdominal pain, anal bleeding and blood in stool  Endocrine: Negative for cold intolerance and heat intolerance  Genitourinary: Negative for difficulty urinating and hematuria  Musculoskeletal: Positive for arthralgias  Negative for gait problem and myalgias  Skin: Negative for color change, pallor and rash  Allergic/Immunologic: Negative for immunocompromised state  Neurological: Negative for syncope and weakness  Hematological: Does not bruise/bleed easily  Psychiatric/Behavioral: Positive for sleep disturbance  The patient is not nervous/anxious  Physical Exam:  Physical Exam  Vitals reviewed  Constitutional:       General: He is not in acute distress  Appearance: Normal appearance  He is obese  He is not ill-appearing, toxic-appearing or diaphoretic  HENT:      Head: Normocephalic  Eyes:      General: No scleral icterus  Neck:      Vascular: No carotid bruit  Cardiovascular:      Rate and Rhythm: Bradycardia present  Pulses: Normal pulses  Heart sounds: Normal heart sounds  No murmur heard  No friction rub  No gallop  Pulmonary:      Effort: Pulmonary effort is normal  No respiratory distress  Breath sounds: Normal breath sounds  No stridor  No wheezing, rhonchi or rales  Abdominal:      General: Bowel sounds are normal       Palpations: Abdomen is soft  Tenderness: There is no abdominal tenderness  Musculoskeletal:      Right lower leg: No edema  Left lower leg: No edema  Skin:     General: Skin is warm and dry  Capillary Refill: Capillary refill takes less than 2 seconds  Coloration: Skin is not jaundiced or pale  Findings: No bruising, erythema or lesion  Neurological:      General: No focal deficit present        Mental Status: He is alert and oriented to person, place, and time  Psychiatric:         Mood and Affect: Mood normal          Discussion/Summary:  Hypertension, longstanding, will restart the amlodipine  He will do ambulatory blood pressures and call me back  Asked him the importance of low-sodium diet, he does not follow strict 2 g of sodium chloride in 24 hours, asked him to do some measurements  Additionally her regular exercise and weight management recommended, he lost close to 50 lb last year but again some of them back, he does not have any history of sleep apnea although his wife thinks he might have it, never had polysomnogram   Consideration for that test   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  No cardiomyopathy  Possible some bradycardia, no high-grade AV block was noted, will do a 24 hour Holter as well as stress test to assess for chronotropic competence  He is now back on low-dose beta-blocker  Echocardiogram during this admission revealed normal left systolic function with normal diastolic parameters interestingly so mild aortic sclerosis and mild mitral tricuspid insufficiency with estimated normal pulmonary pressures suggested by Doppler criteria  Most recent creatinine 1 2 GFR in 60s  Lipids total cholesterol 110 HDL 22 LDL 53,    This note was completed in part utilizing [employer] direct voice recognition software  Grammatical errors, random word insertion, spelling mistakes, and incomplete sentences may be an occasional consequence of the system secondary to software limitations, ambient noise and hardware issues  At the time of dictation, efforts were made to edit, clarify and /or correct errors  Please read the chart carefully and recognize, using context, where substitutions have occurred  If you have any questions or concerns about the context, text or information contained within the body of this dictation, please contact myself, the provider, for further clarification

## 2022-10-07 ENCOUNTER — TELEPHONE (OUTPATIENT)
Dept: CARDIOLOGY CLINIC | Facility: CLINIC | Age: 72
End: 2022-10-07

## 2022-10-07 ENCOUNTER — DOCUMENTATION (OUTPATIENT)
Dept: NEPHROLOGY | Facility: CLINIC | Age: 72
End: 2022-10-07

## 2022-10-07 DIAGNOSIS — I10 PRIMARY HYPERTENSION: Primary | ICD-10-CM

## 2022-10-07 RX ORDER — AMLODIPINE BESYLATE 5 MG/1
5 TABLET ORAL DAILY
Qty: 90 TABLET | Refills: 1 | Status: SHIPPED | OUTPATIENT
Start: 2022-10-07

## 2022-10-07 NOTE — TELEPHONE ENCOUNTER
P/C Dr Brooklyn Albrecht advised pt to restart his amlopidine at ov, but no dose noted,    Pt had been on amlodipine 5mg qd     Also pt has a stress test next week would he take his beta blockers    Dr Brooklyn Albrecht on vacation please advise

## 2022-10-11 ENCOUNTER — HOSPITAL ENCOUNTER (OUTPATIENT)
Dept: NON INVASIVE DIAGNOSTICS | Facility: HOSPITAL | Age: 72
Discharge: HOME | End: 2022-10-11
Attending: INTERNAL MEDICINE
Payer: COMMERCIAL

## 2022-10-11 VITALS — BODY MASS INDEX: 37.59 KG/M2 | HEIGHT: 68 IN | WEIGHT: 248 LBS

## 2022-10-11 DIAGNOSIS — R06.09 DYSPNEA ON EXERTION: ICD-10-CM

## 2022-10-11 DIAGNOSIS — I10 PRIMARY HYPERTENSION: ICD-10-CM

## 2022-10-11 DIAGNOSIS — R42 LIGHTHEADEDNESS: ICD-10-CM

## 2022-10-11 DIAGNOSIS — R94.31 ABNORMAL ELECTROCARDIOGRAM (ECG) (EKG): ICD-10-CM

## 2022-10-11 LAB
BASELINE ST DEPRESSION: 0 MM
MAX HR PERCENT: 94 %
MAX HR: 141 BPM
RATE PRESSURE PRODUCT: NORMAL
SL CV STRESS RECOVERY BP: NORMAL MMHG
SL CV STRESS RECOVERY HR: 81 BPM
SL CV STRESS RECOVERY O2 SAT: 98 %
SL CV STRESS STAGE REACHED: 3
STRESS ANGINA INDEX: 0
STRESS BASELINE BP: NORMAL MMHG
STRESS BASELINE HR: 69 BPM
STRESS DUKE TREADMILL SCORE: 9
STRESS O2 SAT REST: 97 %
STRESS PEAK HR: 141 BPM
STRESS POST ESTIMATED WORKLOAD: 10.1 METS
STRESS POST EXERCISE DUR MIN: 9 MIN
STRESS POST O2 SAT PEAK: 96 %
STRESS POST PEAK BP: 192 MMHG
STRESS ST DEPRESSION: 0 MM

## 2022-10-11 PROCEDURE — 93017 CV STRESS TEST TRACING ONLY: CPT

## 2022-10-11 PROCEDURE — 93225 XTRNL ECG REC<48 HRS REC: CPT

## 2022-10-11 PROCEDURE — 93226 XTRNL ECG REC<48 HR SCAN A/R: CPT

## 2022-10-11 PROCEDURE — 93018 CV STRESS TEST I&R ONLY: CPT | Performed by: STUDENT IN AN ORGANIZED HEALTH CARE EDUCATION/TRAINING PROGRAM

## 2022-10-11 PROCEDURE — 93016 CV STRESS TEST SUPVJ ONLY: CPT | Performed by: STUDENT IN AN ORGANIZED HEALTH CARE EDUCATION/TRAINING PROGRAM

## 2022-10-12 ENCOUNTER — TELEPHONE (OUTPATIENT)
Dept: NEPHROLOGY | Facility: CLINIC | Age: 72
End: 2022-10-12

## 2022-10-12 LAB
CHEST PAIN STATEMENT: NORMAL
MAX DIASTOLIC BP: 60 MMHG
MAX HEART RATE: 141 BPM
MAX PREDICTED HEART RATE: 150 BPM
MAX. SYSTOLIC BP: 192 MMHG
PROTOCOL NAME: NORMAL
TARGET HR FORMULA: NORMAL
TIME IN EXERCISE PHASE: NORMAL

## 2022-10-17 PROCEDURE — 93227 XTRNL ECG REC<48 HR R&I: CPT | Performed by: INTERNAL MEDICINE

## 2022-11-03 ENCOUNTER — DOCUMENTATION (OUTPATIENT)
Dept: NEPHROLOGY | Facility: CLINIC | Age: 72
End: 2022-11-03

## 2022-11-03 NOTE — PROGRESS NOTES
Jr Anthony came into the office today thinking he had an appointment however, this was an old appointment that was rescheduled in August

## 2022-11-07 DIAGNOSIS — N40.1 BENIGN PROSTATIC HYPERPLASIA WITH LOWER URINARY TRACT SYMPTOMS, SYMPTOM DETAILS UNSPECIFIED: ICD-10-CM

## 2022-11-07 RX ORDER — TAMSULOSIN HYDROCHLORIDE 0.4 MG/1
0.4 CAPSULE ORAL
Qty: 90 CAPSULE | Refills: 3 | Status: SHIPPED | OUTPATIENT
Start: 2022-11-07

## 2022-11-12 ENCOUNTER — HOSPITAL ENCOUNTER (OUTPATIENT)
Dept: CT IMAGING | Facility: HOSPITAL | Age: 72
Discharge: HOME | End: 2022-11-12
Attending: INTERNAL MEDICINE

## 2022-11-12 DIAGNOSIS — R93.89 ABNORMAL CT SCAN: ICD-10-CM

## 2022-11-15 ENCOUNTER — APPOINTMENT (OUTPATIENT)
Dept: LAB | Facility: MEDICAL CENTER | Age: 72
End: 2022-11-15

## 2022-11-15 DIAGNOSIS — R73.09 ELEVATED HEMOGLOBIN A1C: ICD-10-CM

## 2022-11-15 DIAGNOSIS — E55.9 VITAMIN D DEFICIENCY: ICD-10-CM

## 2022-11-15 DIAGNOSIS — D64.9 ANEMIA, UNSPECIFIED TYPE: ICD-10-CM

## 2022-11-15 DIAGNOSIS — N18.2 STAGE 2 CHRONIC KIDNEY DISEASE: ICD-10-CM

## 2022-11-15 LAB
25(OH)D3 SERPL-MCNC: 30.2 NG/ML (ref 30–100)
ALBUMIN SERPL BCP-MCNC: 4 G/DL (ref 3.5–5)
ALP SERPL-CCNC: 87 U/L (ref 46–116)
ALT SERPL W P-5'-P-CCNC: 31 U/L (ref 12–78)
ANION GAP SERPL CALCULATED.3IONS-SCNC: 4 MMOL/L (ref 4–13)
AST SERPL W P-5'-P-CCNC: 20 U/L (ref 5–45)
BASOPHILS NFR BLD MANUAL: 1 % (ref 0–1)
BILIRUB SERPL-MCNC: 0.59 MG/DL (ref 0.2–1)
BUN SERPL-MCNC: 19 MG/DL (ref 5–25)
CALCIUM SERPL-MCNC: 9.6 MG/DL (ref 8.3–10.1)
CHLORIDE SERPL-SCNC: 106 MMOL/L (ref 96–108)
CO2 SERPL-SCNC: 27 MMOL/L (ref 21–32)
CREAT SERPL-MCNC: 1.12 MG/DL (ref 0.6–1.3)
ERYTHROCYTE [DISTWIDTH] IN BLOOD BY AUTOMATED COUNT: 12.4 % (ref 11.6–15.1)
FERRITIN SERPL-MCNC: 202 NG/ML (ref 8–388)
GFR SERPL CREATININE-BSD FRML MDRD: 65 ML/MIN/1.73SQ M
GLUCOSE P FAST SERPL-MCNC: 125 MG/DL (ref 65–99)
HCT VFR BLD AUTO: 42.2 % (ref 36.5–49.3)
HGB BLD-MCNC: 13.8 G/DL (ref 12–17)
HGB RETIC QN AUTO: 35.8 PG (ref 30–38.3)
IMM EOSINOPHIL NFR BLD MANUAL: 2 % (ref 0–6)
IMM RETICS NFR: 21.4 % (ref 0–14)
IRON SATN MFR SERPL: 24 % (ref 20–50)
IRON SERPL-MCNC: 90 UG/DL (ref 65–175)
LYMPHOCYTES NFR BLD: 30 % (ref 14–44)
MCH RBC QN AUTO: 30.9 PG (ref 26.8–34.3)
MCHC RBC AUTO-ENTMCNC: 32.7 G/DL (ref 31.4–37.4)
MCV RBC AUTO: 95 FL (ref 82–98)
MONOCYTES NFR BLD AUTO: 4 % (ref 4–12)
NEUTS BAND NFR BLD MANUAL: 3 THOUSAND/UL
NEUTS SEG NFR BLD AUTO: 60 % (ref 45–77)
NRBC BLD AUTO-RTO: 0 /100 WBCS
PHOSPHATE SERPL-MCNC: 2.9 MG/DL (ref 2.3–4.1)
PLATELET # BLD AUTO: 276 THOUSANDS/UL (ref 149–390)
PLATELET BLD QL SMEAR: ADEQUATE
PMV BLD AUTO: 11.5 FL (ref 8.9–12.7)
POTASSIUM SERPL-SCNC: 4 MMOL/L (ref 3.5–5.3)
PROT SERPL-MCNC: 7.6 G/DL (ref 6.4–8.4)
RBC # BLD AUTO: 4.46 MILLION/UL (ref 3.88–5.62)
RBC MORPH BLD: NORMAL
RETICS # AUTO: ABNORMAL 10*3/UL (ref 14356–105094)
RETICS # CALC: 1.91 % (ref 0.37–1.87)
SODIUM SERPL-SCNC: 137 MMOL/L (ref 135–147)
TIBC SERPL-MCNC: 378 UG/DL (ref 250–450)
TOTAL CELLS COUNTED SPEC: 100
WBC # BLD AUTO: 8.15 THOUSAND/UL (ref 4.31–10.16)

## 2022-11-16 LAB
EST. AVERAGE GLUCOSE BLD GHB EST-MCNC: 131 MG/DL
HBA1C MFR BLD: 6.2 %

## 2022-11-17 PROBLEM — R93.89 ABNORMAL CT SCAN: Status: ACTIVE | Noted: 2022-11-17

## 2022-11-17 NOTE — ASSESSMENT & PLAN NOTE
-Has been tracking blood pressure closely at home  Many readings are in the 130s over 70s  He does have some higher readings in the 307O systolic and a few in the 150s  Generally seems to be near goal   Would not make any adjustments to his regimen for today    He can continue to monitor at home  -blood pressure goal is less than 140/90 and ideally less than 130/80  -if pressures are persistently above goal, would consider increasing chlorthalidone to 25 mg daily

## 2022-11-17 NOTE — ASSESSMENT & PLAN NOTE
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

## 2022-11-17 NOTE — ASSESSMENT & PLAN NOTE
-He did have an A1c reading of 6 5 in August, improved to 6 2 on most recent set of labs, fasting glucose 125  -healthy dietary and lifestyle choices encouraged    Continue to monitor

## 2022-11-17 NOTE — PROGRESS NOTES
INTERNAL MEDICINE OFFICE VISIT  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    No major concerns today  He did have a brief episode of vertigo approximately 1 week ago and these episodes appear to be infrequent  Since our last appointment, he has seen Cardiology, completed Holter monitor study and stress test   Stress test unremarkable for ischemia  Minimal ectopic activity, no atrial fibrillation or advanced heart block on Holter monitor  He has also had follow-up with Nephrology  Some adjustments were made to his BP meds, he was switched from hydrochlorothiazide to chlorthalidone, amlodipine was also added by Cardiology    We reviewed his most recent set of labs  Anemia has resolved on, iron levels are adequate      1  Prediabetes  Assessment & Plan:  -He did have an A1c reading of 6 5 in August, improved to 6 2 on most recent set of labs, fasting glucose 125  -healthy dietary and lifestyle choices encouraged  Continue to monitor    Orders:  -     Hemoglobin A1C; Future  -     Basic metabolic panel; Future    2  Primary hypertension  Assessment & Plan:  -Has been tracking blood pressure closely at home  Many readings are in the 130s over 70s  He does have some higher readings in the 437C systolic and a few in the 150s  Generally seems to be near goal   Would not make any adjustments to his regimen for today  He can continue to monitor at home  -blood pressure goal is less than 140/90 and ideally less than 130/80  -if pressures are persistently above goal, would consider increasing chlorthalidone to 25 mg daily       Orders:  -     CBC and differential; Future    3   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[facility])  Assessment & Plan:  Follow-up CT scan from November 2022 shows 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  Stable dating back to October 2021  He does not endorse any recent abdominal pain, nausea, vomiting, changes in his bowel movements  Given that he is asymptomatic at this time, would hold off on any follow-up imaging and monitor for development of symptoms                         Orders Placed This Encounter   Procedures   • CBC and differential   • Hemoglobin A1C   • Basic metabolic panel       Chief Complaint     Chief Complaint   Patient presents with   • Follow-up     3M/discuss CT Results       Review of Systems     10 point ROS negative except per HPI    The following portions of the patient's history were reviewed and updated as appropriate: allergies, current medications, past family history, past medical history, past social history, past surgical history and problem list     Active Problem List     Patient Active Problem List   Diagnosis   • Primary osteoarthritis of right knee   • Benign prostatic hyperplasia   • Glaucoma   • Primary hypertension   • Hyperlipidemia   • Prediabetes   • Nocturia   • Rash   • Cough   • COVID-19 virus infection   • Class 2 severe obesity due to excess calories with serious comorbidity and body mass index (BMI) of 35 0 to 35 9 in [facility])   • Right inguinal pain   • Obesity (BMI 30-39  9)   • Ocular hypertension of left eye   • Lightheadedness   • JOSE LUIS (acute kidney injury) ([facility] [address] )   • Dyspnea on exertion   • Lymph nodes enlarged   • Enlarged prostate   • Normocytic anemia   • Abnormal electrocardiogram (ECG) (EKG)   • 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(HCC)       Objective     /70 (BP Location: Left arm, Patient Position: Sitting, Cuff Size: Large)   Pulse 70   Temp 97 8 °F (36 6 °C)   Ht 5' 8" (1 727 m)   Wt 114 kg (250 lb 12 8 oz)   SpO2 99%   BMI 38 13 kg/m²     Physical Exam  Constitutional:       Appearance: Normal appearance  He is not ill-appearing  HENT:      Head: Normocephalic and atraumatic     Eyes:      General: No scleral icterus  Right eye: No discharge  Left eye: No discharge  Cardiovascular:      Rate and Rhythm: Normal rate and regular rhythm  Heart sounds: No murmur heard  No friction rub  Pulmonary:      Effort: Pulmonary effort is normal       Breath sounds: Normal breath sounds  No wheezing or rales  Abdominal:      General: Abdomen is flat  There is no distension  Palpations: Abdomen is soft  Tenderness: There is no abdominal tenderness  Musculoskeletal:         General: No swelling or tenderness  Skin:     General: Skin is warm and dry  Findings: No erythema  Neurological:      Mental Status: He is alert  Mental status is at baseline  Motor: No weakness  Psychiatric:         Mood and Affect: Mood normal          Behavior: Behavior normal          Pertinent Laboratory/Diagnostic Studies:  CT abdomen pelvis wo contrast    Result Date: 11/16/2022  Impression: 1  No change in appearance of mild mesenteric panniculitis  2   Mild bladder wall thickening may be secondary to underdistention versus element of chronic bladder outlet obstruction given prostate enlargement  No perivesical inflammatory changes  Small bladder diverticulum  3  Cholelithiasis   Limited study without IV or oral contrast  Workstation performed: [id number]       Images and diagnostics reviewed     Current Medications     Current Outpatient Medications:   •  amLODIPine (NORVASC) 5 mg tablet, Take 1 tablet (5 mg total) by mouth daily, Disp: 90 tablet, Rfl: 1  •  Ascorbic Acid (vitamin C) 1000 MG tablet, Take 1,000 mg by mouth daily, Disp: , Rfl:   •  chlorthalidone 25 mg tablet, Take 0 5 tablets (12 5 mg total) by mouth daily, Disp: 15 tablet, Rfl: 6  •  losartan (COZAAR) 50 mg tablet, Take 1 tablet (50 mg total) by mouth daily, Disp: 30 tablet, Rfl: 6  •  metoprolol succinate (TOPROL-XL) 25 mg 24 hr tablet, Take 1 tablet (25 mg total) by mouth daily, Disp: 30 tablet, Rfl: 6  •  Multiple Vitamin (MULTI-VITAMIN DAILY PO), Take 1 tablet by mouth daily, Disp: , Rfl:   •  ROCKLATAN 0 02-0 005 % SOLN, , Disp: , Rfl: 4  •  tamsulosin (FLOMAX) 0 4 mg, Take 1 capsule (0 4 mg total) by mouth daily at bedtime, Disp: 90 capsule, Rfl: 3  •  cholecalciferol (VITAMIN D3) 1,000 units tablet, Take 1,000 Units by mouth daily (Patient not taking: Reported on 11/18/2022), Disp: , Rfl:     Health Maintenance     Health Maintenance   Topic Date Due   • Hepatitis B Vaccine (1 of 3 - 3-dose series) Never done   • Annual Physical  06/15/2022   • Influenza Vaccine (1) Never done   • BMI: Followup Plan  08/14/2023   • Fall Risk  11/18/2023   • Depression Screening  11/18/2023   • BMI: Adult  11/18/2023   • Colorectal Cancer Screening  01/10/2029   • DTaP,Tdap,and Td Vaccines (3 - Td or Tdap) 06/05/2031   • Hepatitis C Screening  Completed   • Pneumococcal Vaccine: 65+ Years  Completed   • COVID-19 Vaccine  Completed   • HIB Vaccine  Aged Out   • IPV Vaccine  Aged Out   • Hepatitis A Vaccine  Aged Out   • Meningococcal ACWY Vaccine  Aged Out   • HPV Vaccine  Aged Out     Immunization History   Administered Date(s) Administered   • COVID-19 MODERNA VACC 0 5 ML IM 02/18/2021, 03/18/2021, 11/19/2021, 07/15/2022   • Pneumococcal Conjugate 13-Valent 11/20/2018   • Pneumococcal Polysaccharide PPV23 06/15/2021   • Tdap 01/01/2014, 06/05/2021       NATALIE Magdaleno [facility] Internal Medicine [facility]  [address], [facility] [address]  [place], [address] [facility]  Office: [phone]  Fax: [phone]

## 2022-11-18 ENCOUNTER — OFFICE VISIT (OUTPATIENT)
Dept: INTERNAL MEDICINE CLINIC | Facility: CLINIC | Age: 72
End: 2022-11-18

## 2022-11-18 VITALS
DIASTOLIC BLOOD PRESSURE: 70 MMHG | OXYGEN SATURATION: 99 % | TEMPERATURE: 97.8 F | WEIGHT: 250.8 LBS | HEIGHT: 68 IN | SYSTOLIC BLOOD PRESSURE: 132 MMHG | BODY MASS INDEX: 38.01 KG/M2 | HEART RATE: 70 BPM

## 2022-11-18 DIAGNOSIS — K65.4 MESENTERIC PANNICULITIS (HCC): ICD-10-CM

## 2022-11-18 DIAGNOSIS — R73.03 PREDIABETES: Primary | ICD-10-CM

## 2022-11-18 DIAGNOSIS — I10 PRIMARY HYPERTENSION: ICD-10-CM

## 2022-11-18 LAB — MISCELLANEOUS LAB TEST RESULT: NORMAL

## 2023-03-02 ENCOUNTER — TELEPHONE (OUTPATIENT)
Dept: NEPHROLOGY | Facility: CLINIC | Age: 73
End: 2023-03-02

## 2023-03-02 DIAGNOSIS — I10 PRIMARY HYPERTENSION: ICD-10-CM

## 2023-03-02 DIAGNOSIS — N17.9 AKI (ACUTE KIDNEY INJURY) (HCC): Primary | ICD-10-CM

## 2023-03-02 NOTE — TELEPHONE ENCOUNTER
Called and spoke to the patient to inform him of the requested lab work prior to his 3/8 appointment with Dr Hilary Serrano  Patient agreeable  Going to a [facility]'s lab to complete it

## 2023-03-02 NOTE — TELEPHONE ENCOUNTER
----- Message from Geraldine Hamilton MD sent at 3/1/2023  3:27 PM EST -----  Regarding: RE: No Active Labs- 3/8 appt  BMP, UACR, UPCR  Thank you   ----- Message -----  From: Darwin Pichardo  Sent: 3/1/2023  12:52 PM EST  To: GEOVANY GIL MD  Subject: No Active Labs- 3/8 appt                         Hi Dr Cierra Jarvis, This patient has an appointment with you on 3/8  No active labs from our office, last are from 11/15  Diagnosis is JOSE LUIS on CKD-2/3  Please advise if/which labs need to be ordered

## 2023-03-06 ENCOUNTER — APPOINTMENT (OUTPATIENT)
Dept: LAB | Facility: MEDICAL CENTER | Age: 73
End: 2023-03-06

## 2023-03-06 DIAGNOSIS — N17.9 AKI (ACUTE KIDNEY INJURY) (HCC): ICD-10-CM

## 2023-03-06 DIAGNOSIS — I10 PRIMARY HYPERTENSION: ICD-10-CM

## 2023-03-06 LAB
ANION GAP SERPL CALCULATED.3IONS-SCNC: 4 MMOL/L (ref 4–13)
BUN SERPL-MCNC: 21 MG/DL (ref 5–25)
CALCIUM SERPL-MCNC: 9.7 MG/DL (ref 8.3–10.1)
CHLORIDE SERPL-SCNC: 104 MMOL/L (ref 96–108)
CO2 SERPL-SCNC: 29 MMOL/L (ref 21–32)
CREAT SERPL-MCNC: 1.18 MG/DL (ref 0.6–1.3)
GFR SERPL CREATININE-BSD FRML MDRD: 61 ML/MIN/1.73SQ M
GLUCOSE SERPL-MCNC: 118 MG/DL (ref 65–140)
POTASSIUM SERPL-SCNC: 4.3 MMOL/L (ref 3.5–5.3)
SODIUM SERPL-SCNC: 137 MMOL/L (ref 135–147)

## 2023-03-07 LAB
CREAT UR-MCNC: 158 MG/DL
CREAT UR-MCNC: 158 MG/DL
MICROALBUMIN UR-MCNC: 9.8 MG/L (ref 0–20)
MICROALBUMIN/CREAT 24H UR: 6 MG/G CREATININE (ref 0–30)
PROT UR-MCNC: 8 MG/DL
PROT/CREAT UR: 0.05 MG/G{CREAT} (ref 0–0.1)

## 2023-03-08 ENCOUNTER — OFFICE VISIT (OUTPATIENT)
Dept: NEPHROLOGY | Facility: CLINIC | Age: 73
End: 2023-03-08

## 2023-03-08 VITALS
BODY MASS INDEX: 38.95 KG/M2 | DIASTOLIC BLOOD PRESSURE: 69 MMHG | HEART RATE: 64 BPM | HEIGHT: 68 IN | WEIGHT: 257 LBS | SYSTOLIC BLOOD PRESSURE: 139 MMHG

## 2023-03-08 DIAGNOSIS — I12.9 BENIGN HYPERTENSION WITH CHRONIC KIDNEY DISEASE, STAGE II: ICD-10-CM

## 2023-03-08 DIAGNOSIS — N18.2 BENIGN HYPERTENSION WITH CHRONIC KIDNEY DISEASE, STAGE II: ICD-10-CM

## 2023-03-08 DIAGNOSIS — N18.2 STAGE 2 CHRONIC KIDNEY DISEASE: Primary | ICD-10-CM

## 2023-03-08 DIAGNOSIS — N28.1 RENAL CYST: ICD-10-CM

## 2023-03-08 DIAGNOSIS — R39.14 BENIGN PROSTATIC HYPERPLASIA WITH INCOMPLETE BLADDER EMPTYING: ICD-10-CM

## 2023-03-08 DIAGNOSIS — E66.01 CLASS 2 SEVERE OBESITY DUE TO EXCESS CALORIES WITH SERIOUS COMORBIDITY AND BODY MASS INDEX (BMI) OF 38.0 TO 38.9 IN ADULT (HCC): ICD-10-CM

## 2023-03-08 DIAGNOSIS — N40.1 BENIGN PROSTATIC HYPERPLASIA WITH INCOMPLETE BLADDER EMPTYING: ICD-10-CM

## 2023-03-08 RX ORDER — DORZOLAMIDE HYDROCHLORIDE AND TIMOLOL MALEATE 20; 5 MG/ML; MG/ML
1 SOLUTION/ DROPS OPHTHALMIC 2 TIMES DAILY
COMMUNITY

## 2023-03-08 NOTE — PATIENT INSTRUCTIONS
Your blood pressure is slightly elevated  Increase chlorthalidone to 25 mg daily  After increasing, check blood work in 1 month and then blood work in 6 months before next office visit

## 2023-03-08 NOTE — PROGRESS NOTES
NEPHROLOGY OFFICE VISIT   Miya Johnson 71 y o  male MRN: [id number]  3/8/2023    Reason for Visit: Hypertension and chronic kidney disease    ASSESSMENT and PLAN:  It was a pleasure evaluating your patient in the office today  Thank you for allowing our team to participate in the care of Mr Miya Johnson  Please do not hesitate to contact our team if further issues/questions shall arise in the interim       # Chronic Kidney Disease Stage II/III-A  - Etiology/risk factors: Hypertension, prediabetes, obesity related hyperfiltration  - Baseline serum creatinine appears to be around 1 0-1 2, most recently 1 18  - Cystatin C 1 26, EGFR 55 11/2022  - Urinalysis: [place]  - Proteinuria: UACR/UPCR: No proteinuria  - Imaging: Right renal cyst, no hydronephrosis  - Renal function is currently stable  - Discussed risk factor reduction to retard progression of chronic kidney disease  - Discussed intensive blood pressure control  - Discussed weight loss with exercise and dietary modifications  - Discussed avoidance of NSAIDs due to their short-term and long-term effects on kidney function    # Right renal cyst  - This was seen on CT abdomen in 2022  - Repeat ultrasound in 1 year to document stability     # Hypertension/Volume   - Target Goal: Less than 120/80 per KDIGO guidelines   - Bilateral lower extremity pitting edema present  - Status: Blood pressure currently above goal both at home and in the office  - Current antihypertensive regimen: Losartan 50 mg daily, Toprol-XL 25 mg daily, chlorthalidone 12 5 mg daily   - Changes: Increase chlorthalidone to 25 mg daily  - Check BMP/magnesium in 1 month after making the change  - We can add spironolactone later if blood pressure remains elevated despite above regimen    # Anemia   - Target Hb: More than 10 g/dL  - Status: Hemoglobin currently improved, most recent hemoglobin 13 8     # Electrolytes/Acid Base status   - No electrolyte or acid-base disturbance     # BPH   - He is currently complaining of incomplete evacuation  - Currently on tamsulosin 0 4 mg at bedtime, advised to continue tamsulosin at current dose  - Advised again to follow-up with urology    # Class II Obesity  - Discussed importance of weight loss and management of hypertension and chronic kidney disease  - Patient is motivated to lose weight and verbalized understanding    # Follow-up  - Blood work in 1 month in 6 months  - Office visit in 6 months    HPI:  Since last visit: Patient has been doing well  He is checking his blood pressure at home and since January his blood pressures have been trending up  He complains of leg swelling  He denies dyspnea  He complains of a sense of incomplete evacuation  Urinary stream is good  70-year-old male with history of hypertension for 40 years, prediabetes and BPH  He was seen in the hospital in August for acute kidney injury  He was admitted with hypotension and elevated creatinine  His admission creatinine was 2 24 that improved with IV fluid hydration and adjustment of antihypertensive regimen      >> Major risk factors for CKD  - Diabetes: Pre diabetes, not on medications   - Hypertension: Yes for 36 years   - Age ? 54 years: Yes   - Family history of kidney disease: No   - Obesity or metabolic syndrome: Yes      >> Medical history evaluation   - Prior kidney disease or dialysis: JOSE LUIS in 08/2022  - Incidental albuminuria or hematuria in the past: No   - Urinary symptoms: Nocturia 3-4 times, stream is weak   - History of foamy or frothy urine: No   - History of nephrolithiasis: No   - Diseases that share risk factors with CKD: DM, HTN  - Systemic diseases that might affect kidney: No   - History of use of medications that might affect renal function: No     PATIENT INSTRUCTIONS:    Patient Instructions   Your blood pressure is slightly elevated  Increase chlorthalidone to 25 mg daily    After increasing, check blood work in 1 month and then blood work in 6 months before next office visit  OBJECTIVE:  Current Weight: Weight - Scale: 117 kg (257 lb)  Vitals:    03/08/23 1407   BP: 139/69   BP Location: Left arm   Patient Position: Sitting   Cuff Size: Large   Pulse: 64   Weight: 117 kg (257 lb)   Height: 5' 8" (1 727 m)    Body mass index is 39 08 kg/m²  REVIEW OF SYSTEMS:    Review of Systems   Constitutional: Negative for chills and fever  HENT: Negative for ear pain and sore throat  Eyes: Negative for pain and visual disturbance  Respiratory: Negative for cough and shortness of breath  Cardiovascular: Positive for leg swelling  Negative for chest pain and palpitations  Gastrointestinal: Negative for abdominal pain and vomiting  Genitourinary: Negative for dysuria and hematuria  Musculoskeletal: Negative for arthralgias and back pain  Skin: Negative for color change and rash  Neurological: Negative for seizures and syncope  All other systems reviewed and are negative        Past Medical History:   Diagnosis Date   • Hearing loss of aging    • History of total left knee replacement    • HL (hearing loss)    • Hx of exposure to tuberculosis     treated in past with meds   • Hyperlipidemia    • Hypertension    • OA (osteoarthritis)     bea  knees   • Ocular hypertension of left eye    • Tinnitus    • Use of cane as ambulatory aid    • Wears glasses        Past Surgical History:   Procedure Laterality Date   • ADENOIDECTOMY     • CATARACT EXTRACTION Bilateral    • COLONOSCOPY     • JOINT REPLACEMENT Left     knee   • VT ARTHRP KNE CONDYLE&PLATU MEDIAL&LAT COMPARTMENTS Left 7/14/2017    Procedure: ARTHROPLASTY KNEE TOTAL;  Surgeon: Armin Keen MD;  Location: [facility];  Service: Orthopedics   • VT ARTHRP KNE CONDYLE&PLATU MEDIAL&LAT COMPARTMENTS Right 9/5/2017    Procedure: ARTHROPLASTY KNEE TOTAL;  Surgeon: Armin Keen MD;  Location: [facility];  Service: Orthopedics   • TONSILLECTOMY     • WISDOM TOOTH EXTRACTION         Family History Problem Relation Age of Onset   • Diabetes Mother    • COPD Father    • Cancer Father         Gastric   • Cancer Paternal Uncle         Gastric   • Breast cancer Family    • Thyroid cancer Family         Social History     Substance and Sexual Activity   Alcohol Use No     Social History     Substance and Sexual Activity   Drug Use No     Social History     Tobacco Use   Smoking Status Former   • Types: Cigarettes   • Quit date: 1972   • Years since quittin 7   Smokeless Tobacco Never   Tobacco Comments    per [employer]:  Never a smoker       PHYSICAL EXAM:      Physical Exam  Constitutional:       Appearance: Normal appearance  HENT:      Head: Normocephalic and atraumatic  Mouth/Throat:      Mouth: Mucous membranes are moist       Pharynx: Oropharynx is clear  Cardiovascular:      Rate and Rhythm: Normal rate and regular rhythm  Pulses: Normal pulses  Heart sounds: Normal heart sounds  Pulmonary:      Effort: Pulmonary effort is normal       Breath sounds: Normal breath sounds  Abdominal:      General: Bowel sounds are normal       Palpations: Abdomen is soft  Musculoskeletal:         General: Normal range of motion  Right lower leg: Edema present  Left lower leg: Edema present  Skin:     General: Skin is warm and dry  Neurological:      General: No focal deficit present  Mental Status: He is alert and oriented to person, place, and time  Mental status is at baseline  Psychiatric:         Mood and Affect: Mood normal          Behavior: Behavior normal          Thought Content:  Thought content normal          Judgment: Judgment normal          Medications:    Current Outpatient Medications:   •  amLODIPine (NORVASC) 5 mg tablet, Take 1 tablet (5 mg total) by mouth daily, Disp: 90 tablet, Rfl: 1  •  Ascorbic Acid (vitamin C) 1000 MG tablet, Take 1,000 mg by mouth daily, Disp: , Rfl:   •  chlorthalidone 25 mg tablet, Take 0 5 tablets (12 5 mg total) by mouth daily, Disp: 15 tablet, Rfl: 6  •  dorzolamide-timolol (COSOPT) 22 3-6 8 MG/ML ophthalmic solution, 1 drop 2 (two) times a day, Disp: , Rfl:   •  losartan (COZAAR) 50 mg tablet, Take 1 tablet (50 mg total) by mouth daily, Disp: 30 tablet, Rfl: 6  •  metoprolol succinate (TOPROL-XL) 25 mg 24 hr tablet, Take 1 tablet (25 mg total) by mouth daily, Disp: 30 tablet, Rfl: 6  •  Multiple Vitamin (MULTI-VITAMIN DAILY PO), Take 1 tablet by mouth daily, Disp: , Rfl:   •  tamsulosin (FLOMAX) 0 4 mg, Take 1 capsule (0 4 mg total) by mouth daily at bedtime, Disp: 90 capsule, Rfl: 3  •  cholecalciferol (VITAMIN D3) 1,000 units tablet, Take 1,000 Units by mouth daily (Patient not taking: Reported on 11/18/2022), Disp: , Rfl:   •  ROCKLATAN 0 02-0 005 % SOLN, , Disp: , Rfl: 4    Laboratory Results:  Results from last 7 days   Lab Units 03/06/23  1511   POTASSIUM mmol/L 4 3   CHLORIDE mmol/L 104   CO2 mmol/L 29   BUN mg/dL 21   CREATININE mg/dL 1 18   CALCIUM mg/dL 9 7       Results for orders placed or performed in visit on 91/93/38   Basic metabolic panel   Result Value Ref Range    Sodium 137 135 - 147 mmol/L    Potassium 4 3 3 5 - 5 3 mmol/L    Chloride 104 96 - 108 mmol/L    CO2 29 21 - 32 mmol/L    ANION GAP 4 4 - 13 mmol/L    BUN 21 5 - 25 mg/dL    Creatinine 1 18 0 60 - 1 30 mg/dL    Glucose 118 65 - 140 mg/dL    Calcium 9 7 8 3 - 10 1 mg/dL    eGFR 61 ml/min/1 73sq m   Microalbumin / creatinine urine ratio   Result Value Ref Range    Creatinine, Ur 158 0 mg/dL    Microalbum  ,U,Random 9 8 0 0 - 20 0 mg/L    Microalb Creat Ratio 6 0 - 30 mg/g creatinine   Protein / creatinine ratio, urine   Result Value Ref Range    Creatinine, Ur 158 0 mg/dL    Protein Urine Random 8 mg/dL    Prot/Creat Ratio, Ur 0 05 0 00 - 0 10

## 2023-03-20 ENCOUNTER — OFFICE VISIT (OUTPATIENT)
Dept: INTERNAL MEDICINE CLINIC | Facility: CLINIC | Age: 73
End: 2023-03-20

## 2023-03-20 VITALS
DIASTOLIC BLOOD PRESSURE: 76 MMHG | RESPIRATION RATE: 13 BRPM | TEMPERATURE: 97.9 F | WEIGHT: 255 LBS | SYSTOLIC BLOOD PRESSURE: 148 MMHG | HEIGHT: 68 IN | HEART RATE: 80 BPM | BODY MASS INDEX: 38.65 KG/M2

## 2023-03-20 DIAGNOSIS — R39.14 BENIGN PROSTATIC HYPERPLASIA WITH INCOMPLETE BLADDER EMPTYING: ICD-10-CM

## 2023-03-20 DIAGNOSIS — I10 PRIMARY HYPERTENSION: ICD-10-CM

## 2023-03-20 DIAGNOSIS — Z12.5 SCREENING FOR PROSTATE CANCER: ICD-10-CM

## 2023-03-20 DIAGNOSIS — N18.2 STAGE 2 CHRONIC KIDNEY DISEASE: ICD-10-CM

## 2023-03-20 DIAGNOSIS — Z00.00 ANNUAL PHYSICAL EXAM: Primary | ICD-10-CM

## 2023-03-20 DIAGNOSIS — Z96.659 HISTORY OF KNEE REPLACEMENT, UNSPECIFIED LATERALITY: ICD-10-CM

## 2023-03-20 DIAGNOSIS — N40.1 BENIGN PROSTATIC HYPERPLASIA WITH INCOMPLETE BLADDER EMPTYING: ICD-10-CM

## 2023-03-20 RX ORDER — CEPHALEXIN 500 MG/1
2000 CAPSULE ORAL ONCE
Qty: 4 CAPSULE | Refills: 0 | Status: SHIPPED | OUTPATIENT
Start: 2023-03-20 | End: 2023-03-20

## 2023-03-20 RX ORDER — CHLORTHALIDONE 25 MG/1
25 TABLET ORAL DAILY
Qty: 30 TABLET | Refills: 0
Start: 2023-03-20 | End: 2023-04-19

## 2023-03-20 NOTE — ASSESSMENT & PLAN NOTE
Continues to monitor BP at home  He increased his chlorthalidone dose to 25 mg approximately 10 days ago  Systolic pressures generally in the 140s, a few higher readings in the 150s  Diastolic readings generally in the 70s to 80s  He is going to try to increase his activity level and lose some weight over the coming months  -Can continue with current regimen for now  Patient also following with nephrology in regards to BP management    Can consider increasing losartan dose versus addition of spironolactone if BP remains elevated

## 2023-03-20 NOTE — ASSESSMENT & PLAN NOTE
Maintained on Flomax    Has had issues with weak stream   Has not had follow-up with urology in some time and he will be scheduling follow-up appointment

## 2023-03-20 NOTE — PROGRESS NOTES
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, stage II/III CKD      Problem List Items Addressed This Visit        Cardiovascular and Mediastinum    Primary hypertension     Continues to monitor BP at home  He increased his chlorthalidone dose to 25 mg approximately 10 days ago  Systolic pressures generally in the 140s, a few higher readings in the 150s  Diastolic readings generally in the 70s to 80s  He is going to try to increase his activity level and lose some weight over the coming months  -Can continue with current regimen for now  Patient also following with nephrology in regards to BP management  Can consider increasing losartan dose versus addition of spironolactone if BP remains elevated             Relevant Medications    chlorthalidone 25 mg tablet       Genitourinary    Benign prostatic hyperplasia     Maintained on Flomax  Has had issues with weak stream   Has not had follow-up with urology in some time and he will be scheduling follow-up appointment         Stage 2 chronic kidney disease     Continue follow-up with nephrology, renal function has been stable         Relevant Medications    chlorthalidone 25 mg tablet   Other Visit Diagnoses     Annual physical exam    -  Primary    Relevant Orders    Lipid Panel with Direct LDL reflex    Hemoglobin A1C    Screening for prostate cancer        Relevant Orders    PSA, Total Screen    History of knee replacement, unspecified laterality        Relevant Medications    cephalexin (KEFLEX) 500 mg capsule          Immunizations and preventive care screenings were discussed with patient today  Appropriate education was printed on patient's after visit summary      Discussed risks and benefits of prostate cancer screening  We discussed the controversial history of PSA screening for prostate cancer in the [place] as well as the risk of over detection and over treatment of prostate cancer by way of PSA screening  The patient understands that PSA blood testing is an imperfect way to screen for prostate cancer and that elevated PSA levels in the blood may also be caused by infection, inflammation, prostatic trauma or manipulation, urological procedures, or by benign prostatic enlargement  The role of the digital rectal examination in prostate cancer screening was also discussed and I discussed with him that there is large interobserver variability in the findings of digital rectal examination  Counseling:  Alcohol/drug use: discussed moderation in alcohol intake, the recommendations for healthy alcohol use, and avoidance of illicit drug use  Dental Health: discussed importance of regular tooth brushing, flossing, and dental visits  Exercise: the importance of regular exercise/physical activity was discussed  Recommend exercise 3-5 times per week for at least 30 minutes  Return in about 4 months (around 7/20/2023)  Chief Complaint:     Chief Complaint   Patient presents with   • Physical Exam      History of Present Illness:     Adult Annual Physical   Patient here for a comprehensive physical exam  The patient reports no problems  Depression Screening  PHQ-2/9 Depression Screening    Little interest or pleasure in doing things: 0 - not at all  Feeling down, depressed, or hopeless: 0 - not at all  PHQ-2 Score: 0  PHQ-2 Interpretation: Negative depression screen       General Health  Hearing: Wears hearing aids  Vision: wears glasses  Dental: regular dental visits          Health  Symptoms include: weak urinary stream     Review of Systems:     Review of Systems   Past Medical History:     Past Medical History:   Diagnosis Date   • Hearing loss of aging • History of total left knee replacement    • HL (hearing loss)    • Hx of exposure to tuberculosis     treated in past with meds   • Hyperlipidemia    • Hypertension    • OA (osteoarthritis)     bea  knees   • Ocular hypertension of left eye    • Tinnitus    • Use of cane as ambulatory aid    • Wears glasses       Past Surgical History:     Past Surgical History:   Procedure Laterality Date   • ADENOIDECTOMY     • CATARACT EXTRACTION Bilateral    • COLONOSCOPY     • JOINT REPLACEMENT Left     knee   • CO ARTHRP KNE CONDYLE&PLATU MEDIAL&LAT COMPARTMENTS Left 2017    Procedure: ARTHROPLASTY KNEE TOTAL;  Surgeon: Maria Del Carmen Beckford MD;  Location: [facility];  Service: Orthopedics   • CO ARTHRP KNE CONDYLE&PLATU MEDIAL&LAT COMPARTMENTS Right 2017    Procedure: ARTHROPLASTY KNEE TOTAL;  Surgeon: Maria Del Carmen Beckford MD;  Location: [facility];  Service: Orthopedics   • TONSILLECTOMY     • WISDOM TOOTH EXTRACTION        Family History:     Family History   Problem Relation Age of Onset   • Diabetes Mother    • COPD Father    • Cancer Father         Gastric   • Cancer Paternal Uncle         Gastric   • Breast cancer Family    • Thyroid cancer Family       Social History:     Social History     Socioeconomic History   • Marital status: /Civil Union     Spouse name: None   • Number of children: None   • Years of education: None   • Highest education level: None   Occupational History   • None   Tobacco Use   • Smoking status: Former     Types: Cigarettes     Quit date: 1972     Years since quittin 7   • Smokeless tobacco: Never   • Tobacco comments:     per [employer]:  Never a smoker   Vaping Use   • Vaping Use: Never used   Substance and Sexual Activity   • Alcohol use: No   • Drug use: No   • Sexual activity: None   Other Topics Concern   • None   Social History Narrative   • None     Social Determinants of Health     Financial Resource Strain: Not on file   Food Insecurity: Not on file   Transportation Needs: Not on file   Physical Activity: Not on file   Stress: Not on file   Social Connections: Not on file   Intimate Partner Violence: Not on file   Housing Stability: Not on file      Current Medications:     Current Outpatient Medications   Medication Sig Dispense Refill   • cephalexin (KEFLEX) 500 mg capsule Take 4 capsules (2,000 mg total) by mouth 1 (one) time for 1 dose Take 1 hour prior to dental procedure 4 capsule 0   • chlorthalidone 25 mg tablet Take 1 tablet (25 mg total) by mouth daily 30 tablet 0   • amLODIPine (NORVASC) 5 mg tablet Take 1 tablet (5 mg total) by mouth daily 90 tablet 1   • Ascorbic Acid (vitamin C) 1000 MG tablet Take 1,000 mg by mouth daily     • dorzolamide-timolol (COSOPT) 22 3-6 8 MG/ML ophthalmic solution 1 drop 2 (two) times a day     • losartan (COZAAR) 50 mg tablet Take 1 tablet (50 mg total) by mouth daily 30 tablet 6   • metoprolol succinate (TOPROL-XL) 25 mg 24 hr tablet Take 1 tablet (25 mg total) by mouth daily 30 tablet 6   • Multiple Vitamin (MULTI-VITAMIN DAILY PO) Take 1 tablet by mouth daily     • tamsulosin (FLOMAX) 0 4 mg Take 1 capsule (0 4 mg total) by mouth daily at bedtime 90 capsule 3     No current facility-administered medications for this visit  Allergies: Allergies   Allergen Reactions   • Celebrex [Celecoxib] GI Intolerance     Nausea - pt able to tolerate with Prevacid   • Influenza Vaccines        "I get the flu or feel horrible for months"      Physical Exam:     /76 (BP Location: Left arm, Patient Position: Sitting, Cuff Size: Large)   Pulse 80   Temp 97 9 °F (36 6 °C)   Resp 13   Ht 5' 8" (1 727 m)   Wt 116 kg (255 lb)   BMI 38 77 kg/m²     Physical Exam  Constitutional:       Appearance: Normal appearance  He is not ill-appearing  HENT:      Head: Normocephalic and atraumatic  Eyes:      General: No scleral icterus  Right eye: No discharge  Left eye: No discharge     Cardiovascular:      Rate and Rhythm: Normal rate and regular rhythm  Heart sounds: No murmur heard  No friction rub  Pulmonary:      Effort: Pulmonary effort is normal       Breath sounds: Normal breath sounds  No wheezing or rales  Abdominal:      General: Abdomen is flat  There is no distension  Palpations: Abdomen is soft  Tenderness: There is no abdominal tenderness  Musculoskeletal:         General: No swelling, tenderness or deformity  Skin:     General: Skin is warm and dry  Findings: No erythema  Neurological:      Mental Status: He is alert and oriented to person, place, and time  Mental status is at baseline  Motor: No weakness     Psychiatric:         Mood and Affect: Mood normal          Behavior: Behavior normal           Eddie Locke Mealing, DO  [facility]

## 2023-04-04 DIAGNOSIS — I10 PRIMARY HYPERTENSION: ICD-10-CM

## 2023-04-04 RX ORDER — AMLODIPINE BESYLATE 5 MG/1
TABLET ORAL
Qty: 90 TABLET | Refills: 3 | Status: SHIPPED | OUTPATIENT
Start: 2023-04-04

## 2023-04-24 ENCOUNTER — APPOINTMENT (OUTPATIENT)
Dept: LAB | Facility: MEDICAL CENTER | Age: 73
End: 2023-04-24

## 2023-04-24 DIAGNOSIS — E66.01 CLASS 2 SEVERE OBESITY DUE TO EXCESS CALORIES WITH SERIOUS COMORBIDITY AND BODY MASS INDEX (BMI) OF 38.0 TO 38.9 IN ADULT (HCC): ICD-10-CM

## 2023-04-24 DIAGNOSIS — N18.2 STAGE 2 CHRONIC KIDNEY DISEASE: ICD-10-CM

## 2023-04-24 DIAGNOSIS — I12.9 BENIGN HYPERTENSION WITH CHRONIC KIDNEY DISEASE, STAGE II: ICD-10-CM

## 2023-04-24 DIAGNOSIS — N18.2 BENIGN HYPERTENSION WITH CHRONIC KIDNEY DISEASE, STAGE II: ICD-10-CM

## 2023-04-25 DIAGNOSIS — N18.2 STAGE 2 CHRONIC KIDNEY DISEASE: ICD-10-CM

## 2023-04-25 LAB
ALBUMIN SERPL BCP-MCNC: 3.8 G/DL (ref 3.5–5)
ANION GAP SERPL CALCULATED.3IONS-SCNC: 7 MMOL/L (ref 4–13)
BUN SERPL-MCNC: 25 MG/DL (ref 5–25)
CALCIUM SERPL-MCNC: 9.5 MG/DL (ref 8.3–10.1)
CHLORIDE SERPL-SCNC: 102 MMOL/L (ref 96–108)
CO2 SERPL-SCNC: 27 MMOL/L (ref 21–32)
CREAT SERPL-MCNC: 1.13 MG/DL (ref 0.6–1.3)
GFR SERPL CREATININE-BSD FRML MDRD: 65 ML/MIN/1.73SQ M
GLUCOSE SERPL-MCNC: 108 MG/DL (ref 65–140)
MAGNESIUM SERPL-MCNC: 2.2 MG/DL (ref 1.6–2.6)
PHOSPHATE SERPL-MCNC: 2.9 MG/DL (ref 2.3–4.1)
POTASSIUM SERPL-SCNC: 3.9 MMOL/L (ref 3.5–5.3)
SODIUM SERPL-SCNC: 136 MMOL/L (ref 135–147)

## 2023-04-25 RX ORDER — CHLORTHALIDONE 25 MG/1
25 TABLET ORAL DAILY
Qty: 90 TABLET | Refills: 0 | Status: SHIPPED | OUTPATIENT
Start: 2023-04-25

## 2023-05-31 DIAGNOSIS — N18.2 STAGE 2 CHRONIC KIDNEY DISEASE: ICD-10-CM

## 2023-05-31 RX ORDER — METOPROLOL SUCCINATE 25 MG/1
25 TABLET, EXTENDED RELEASE ORAL DAILY
Qty: 30 TABLET | Refills: 6 | Status: SHIPPED | OUTPATIENT
Start: 2023-05-31

## 2023-07-24 DIAGNOSIS — N18.2 STAGE 2 CHRONIC KIDNEY DISEASE: ICD-10-CM

## 2023-07-24 RX ORDER — CHLORTHALIDONE 25 MG/1
25 TABLET ORAL DAILY
Qty: 30 TABLET | Refills: 0 | Status: SHIPPED | OUTPATIENT
Start: 2023-07-24

## 2023-08-15 ENCOUNTER — APPOINTMENT (OUTPATIENT)
Dept: LAB | Facility: MEDICAL CENTER | Age: 73
End: 2023-08-15

## 2023-08-15 DIAGNOSIS — E66.01 CLASS 2 SEVERE OBESITY DUE TO EXCESS CALORIES WITH SERIOUS COMORBIDITY AND BODY MASS INDEX (BMI) OF 38.0 TO 38.9 IN ADULT (HCC): ICD-10-CM

## 2023-08-15 DIAGNOSIS — I12.9 BENIGN HYPERTENSION WITH CHRONIC KIDNEY DISEASE, STAGE II: ICD-10-CM

## 2023-08-15 DIAGNOSIS — I10 PRIMARY HYPERTENSION: ICD-10-CM

## 2023-08-15 DIAGNOSIS — N18.2 STAGE 2 CHRONIC KIDNEY DISEASE: ICD-10-CM

## 2023-08-15 DIAGNOSIS — R73.03 PREDIABETES: ICD-10-CM

## 2023-08-15 DIAGNOSIS — N18.2 BENIGN HYPERTENSION WITH CHRONIC KIDNEY DISEASE, STAGE II: ICD-10-CM

## 2023-08-15 DIAGNOSIS — Z12.5 SCREENING FOR PROSTATE CANCER: ICD-10-CM

## 2023-08-15 DIAGNOSIS — Z00.8 HEALTH EXAMINATION IN POPULATION SURVEY: ICD-10-CM

## 2023-08-15 LAB
ALBUMIN SERPL BCP-MCNC: 4.1 G/DL (ref 3.5–5)
ANION GAP SERPL CALCULATED.3IONS-SCNC: 8 MMOL/L
BASOPHILS # BLD AUTO: 0.05 THOUSANDS/ÂΜL (ref 0–0.1)
BASOPHILS NFR BLD AUTO: 1 % (ref 0–1)
BUN SERPL-MCNC: 23 MG/DL (ref 5–25)
CALCIUM SERPL-MCNC: 9.6 MG/DL (ref 8.3–10.1)
CHLORIDE SERPL-SCNC: 102 MMOL/L (ref 96–108)
CHOLEST SERPL-MCNC: 150 MG/DL
CO2 SERPL-SCNC: 27 MMOL/L (ref 21–32)
CREAT SERPL-MCNC: 1.17 MG/DL (ref 0.6–1.3)
CREAT UR-MCNC: 94 MG/DL
CREAT UR-MCNC: 94 MG/DL
EOSINOPHIL # BLD AUTO: 0.13 THOUSAND/ÂΜL (ref 0–0.61)
EOSINOPHIL NFR BLD AUTO: 2 % (ref 0–6)
ERYTHROCYTE [DISTWIDTH] IN BLOOD BY AUTOMATED COUNT: 12.6 % (ref 11.6–15.1)
EST. AVERAGE GLUCOSE BLD GHB EST-MCNC: 148 MG/DL
GFR SERPL CREATININE-BSD FRML MDRD: 62 ML/MIN/1.73SQ M
GLUCOSE P FAST SERPL-MCNC: 127 MG/DL (ref 65–99)
HBA1C MFR BLD: 6.8 %
HCT VFR BLD AUTO: 41.9 % (ref 36.5–49.3)
HDLC SERPL-MCNC: 29 MG/DL
HGB BLD-MCNC: 14 G/DL (ref 12–17)
IMM GRANULOCYTES # BLD AUTO: 0.01 THOUSAND/UL (ref 0–0.2)
IMM GRANULOCYTES NFR BLD AUTO: 0 % (ref 0–2)
LDLC SERPL CALC-MCNC: 76 MG/DL (ref 0–100)
LYMPHOCYTES # BLD AUTO: 2.25 THOUSANDS/ÂΜL (ref 0.6–4.47)
LYMPHOCYTES NFR BLD AUTO: 32 % (ref 14–44)
MAGNESIUM SERPL-MCNC: 2.5 MG/DL (ref 1.6–2.6)
MCH RBC QN AUTO: 31.5 PG (ref 26.8–34.3)
MCHC RBC AUTO-ENTMCNC: 33.4 G/DL (ref 31.4–37.4)
MCV RBC AUTO: 94 FL (ref 82–98)
MICROALBUMIN UR-MCNC: 5.5 MG/L (ref 0–20)
MICROALBUMIN/CREAT 24H UR: 6 MG/G CREATININE (ref 0–30)
MONOCYTES # BLD AUTO: 0.7 THOUSAND/ÂΜL (ref 0.17–1.22)
MONOCYTES NFR BLD AUTO: 10 % (ref 4–12)
NEUTROPHILS # BLD AUTO: 3.96 THOUSANDS/ÂΜL (ref 1.85–7.62)
NEUTS SEG NFR BLD AUTO: 55 % (ref 43–75)
NONHDLC SERPL-MCNC: 121 MG/DL
NRBC BLD AUTO-RTO: 0 /100 WBCS
PHOSPHATE SERPL-MCNC: 2.6 MG/DL (ref 2.3–4.1)
PLATELET # BLD AUTO: 289 THOUSANDS/UL (ref 149–390)
PMV BLD AUTO: 11.7 FL (ref 8.9–12.7)
POTASSIUM SERPL-SCNC: 3.7 MMOL/L (ref 3.5–5.3)
PROT UR-MCNC: 8 MG/DL
PROT/CREAT UR: 0.09 MG/G{CREAT} (ref 0–0.1)
PSA SERPL-MCNC: 1.76 NG/ML (ref 0–4)
RBC # BLD AUTO: 4.44 MILLION/UL (ref 3.88–5.62)
SODIUM SERPL-SCNC: 137 MMOL/L (ref 135–147)
TRIGL SERPL-MCNC: 225 MG/DL
WBC # BLD AUTO: 7.1 THOUSAND/UL (ref 4.31–10.16)

## 2023-08-15 PROCEDURE — 82570 ASSAY OF URINE CREATININE: CPT

## 2023-08-15 PROCEDURE — 85025 COMPLETE CBC W/AUTO DIFF WBC: CPT

## 2023-08-15 PROCEDURE — 83036 HEMOGLOBIN GLYCOSYLATED A1C: CPT

## 2023-08-15 PROCEDURE — 80061 LIPID PANEL: CPT

## 2023-08-15 PROCEDURE — 84156 ASSAY OF PROTEIN URINE: CPT

## 2023-08-15 PROCEDURE — 82043 UR ALBUMIN QUANTITATIVE: CPT

## 2023-08-15 PROCEDURE — 80069 RENAL FUNCTION PANEL: CPT

## 2023-08-15 PROCEDURE — G0103 PSA SCREENING: HCPCS

## 2023-08-15 PROCEDURE — 36415 COLL VENOUS BLD VENIPUNCTURE: CPT

## 2023-08-22 DIAGNOSIS — N18.2 STAGE 2 CHRONIC KIDNEY DISEASE: ICD-10-CM

## 2023-08-22 RX ORDER — CHLORTHALIDONE 25 MG/1
25 TABLET ORAL DAILY
Qty: 90 TABLET | Refills: 1 | Status: SHIPPED | OUTPATIENT
Start: 2023-08-22

## 2023-09-11 NOTE — PROGRESS NOTES
NEPHROLOGY OFFICE VISIT   Ankush Gutiérrez 71 y.o. male MRN: [id number]  9/11/2023    Reason for Visit: Hypertension and chronic kidney disease    ASSESSMENT and PLAN:  It was a pleasure evaluating your patient in the office today. Thank you for allowing our team to participate in the care of Mr. Ankush Gutiérrez. Please do not hesitate to contact our team if further issues/questions shall arise in the interim.      # Chronic Kidney Disease Stage II  # Decreased calculated GFR  - Etiology/risk factors: Hypertension, prediabetes, obesity related hyperfiltration  - Baseline serum creatinine appears to be around 1.0-1.2, most recently 1.17 08/2023  - Cystatin C 1.26, EGFR 55 11/2022  - Serum creatinine probably overestimates kidney function   - Urinalysis: [place]  - Proteinuria: UACR/UPCR: No proteinuria  - Imaging: Right renal cyst, no hydronephrosis  - Renal function is currently stable  - Discussed risk factor reduction to retard progression of chronic kidney disease  - Discussed intensive blood pressure control as below   - Discussed weight loss with exercise and dietary modifications  - Discussed avoidance of NSAIDs     # Right renal cyst  - This was seen on CT abdomen in 2022  - Repeat ultrasound in 2 months to document stability  - If kidney ultrasound shows a simple cyst, we would not do further work-up     # Hypertension/Volume   - Target Goal: Less than 120/80 per KDIGO guidelines   - Bilateral lower extremity swelling present most likely chronic venous insufficiency, no evidence of volume overload overall  - Status: Blood pressure currently well controlled and at goal  - Current antihypertensive regimen: Losartan 50 mg daily, Toprol-XL 25 mg daily, chlorthalidone 25 mg daily and amlodipine 5 mg daily  - Changes: No changes were made to antihypertensive regimen today as blood pressure is well controlled  - If lower extremity swelling worsens in future, can stop amlodipine and maximize losartan dose    # Screening for anemia   - Target Hb: More than 10 g/dL  - Status: Hemoglobin currently improved, most recent hemoglobin 14.0      # Electrolytes/Acid Base status   - No electrolyte or acid-base disturbance     # BPH   - He is currently complaining of weak urinary stream that is probably related to underlying BPH  - Currently on tamsulosin 0.4 mg at bedtime, advised to continue tamsulosin at current dose  - Reviewed notes from urology regarding management of BPH and need for cystoscopy for further evaluation    # Class II Obesity  - Discussed importance of weight loss in management of hypertension/chronic kidney disease and especially in prevention of diabetes  - Patient is motivated to lose weight and verbalized understanding    # Follow-up  - Blood work in 6 months and then in 1 year  - Office follow-up in 1 year    HPI:  Since last visit: Patient has been doing well. No hospitalizations. He has a weak urinary stream.  He has leg swelling. He denies dyspnea. 67-year-old male with history of hypertension for 40 years, prediabetes and BPH. He was seen in the hospital in August for acute kidney injury. He was admitted with hypotension and elevated creatinine. His admission creatinine was 2.24 that improved with IV fluid hydration and adjustment of antihypertensive regimen.     >> Major risk factors for CKD  - Diabetes: Pre diabetes, not on medications   - Hypertension: Yes for 36 years   - Age ? 55 years: Yes   - Family history of kidney disease: No   - Obesity or metabolic syndrome:  Yes      >> Medical history evaluation   - Prior kidney disease or dialysis: JO[facility] in 08/2022  - Incidental albuminuria or hematuria in the past: No   - Urinary symptoms: Nocturia 3-4 times, stream is weak   - History of foamy or frothy urine: No   - History of nephrolithiasis: No   - Diseases that share risk factors with CKD: DM, HTN  - Systemic diseases that might affect kidney: No   - History of use of medications that might affect renal function: No     PATIENT INSTRUCTIONS:    There are no Patient Instructions on file for this visit. OBJECTIVE:  Current Weight:    There were no vitals filed for this visit. There is no height or weight on file to calculate BMI. REVIEW OF SYSTEMS:    Review of Systems   Constitutional: Negative for chills and fever. HENT: Negative for ear pain and sore throat. Eyes: Negative for pain and visual disturbance. Respiratory: Negative for cough and shortness of breath. Cardiovascular: Positive for leg swelling. Negative for chest pain and palpitations. Gastrointestinal: Negative for abdominal pain and vomiting. Genitourinary: Positive for difficulty urinating. Negative for dysuria and hematuria. Musculoskeletal: Negative for arthralgias and back pain. Skin: Negative for color change and rash. Neurological: Negative for seizures and syncope. All other systems reviewed and are negative.       Past Medical History:   Diagnosis Date   • Hearing loss of aging    • History of total left knee replacement    • HL (hearing loss)    • Hx of exposure to tuberculosis     treated in past with meds   • Hyperlipidemia    • Hypertension    • OA (osteoarthritis)     bea  knees   • Ocular hypertension of left eye    • Tinnitus    • Use of cane as ambulatory aid    • Wears glasses        Past Surgical History:   Procedure Laterality Date   • ADENOIDECTOMY     • CATARACT EXTRACTION Bilateral    • COLONOSCOPY     • JOINT REPLACEMENT Left     knee   • MS ARTHRP KNE CONDYLE&PLATU MEDIAL&LAT COMPARTMENTS Left 7/14/2017    Procedure: ARTHROPLASTY KNEE TOTAL;  Surgeon: Maddi Hannon MD;  Location[facility];  Service: Orthopedics   • MS ARTHRP KNE CONDYLE&PLATU MEDIAL&LAT COMPARTMENTS Right 9/5/2017    Procedure: ARTHROPLASTY KNEE TOTAL;  Surgeon: Maddi Hannon MD;  [facility];  Service: Orthopedics   • TONSILLECTOMY     • WISDOM TOOTH EXTRACTION         Family History   Problem Relation Age of Onset   • Diabetes Mother    • COPD Father    • Cancer Father         Gastric   • Cancer Paternal Uncle         Gastric   • Breast cancer Family    • Thyroid cancer Family         Social History     Substance and Sexual Activity   Alcohol Use No     Social History     Substance and Sexual Activity   Drug Use No     Social History     Tobacco Use   Smoking Status Former   • Types: Cigarettes   • Quit date: 1972   • Years since quittin.2   Smokeless Tobacco Never   Tobacco Comments    per Allscripts:  Never a smoker       PHYSICAL EXAM:      Physical Exam  Constitutional:       Appearance: Normal appearance. HENT:      Head: Normocephalic and atraumatic. Mouth/Throat:      Mouth: Mucous membranes are moist.      Pharynx: Oropharynx is clear. Cardiovascular:      Rate and Rhythm: Normal rate and regular rhythm. Pulses: Normal pulses. Heart sounds: Normal heart sounds. Pulmonary:      Effort: Pulmonary effort is normal.      Breath sounds: Normal breath sounds. Abdominal:      General: Bowel sounds are normal.      Palpations: Abdomen is soft. Musculoskeletal:         General: Normal range of motion. Right lower leg: Edema present. Left lower leg: Edema present. Skin:     General: Skin is warm and dry. Neurological:      General: No focal deficit present. Mental Status: He is alert and oriented to person, place, and time. Mental status is at baseline. Psychiatric:         Mood and Affect: Mood normal.         Behavior: Behavior normal.         Thought Content:  Thought content normal.         Judgment: Judgment normal.       Medications:    Current Outpatient Medications:   •  amLODIPine (NORVASC) 5 mg tablet, TAKE ONE TABLET BY MOUTH EVERY DAY, Disp: 90 tablet, Rfl: 3  •  Ascorbic Acid (vitamin C) 1000 MG tablet, Take 1,000 mg by mouth daily, Disp: , Rfl:   •  chlorthalidone 25 mg tablet, Take 1 tablet (25 mg total) by mouth daily, Disp: 90 tablet, Rfl: 1  • dorzolamide-timolol (COSOPT) 22.3-6.8 MG/ML ophthalmic solution, 1 drop 2 (two) times a day, Disp: , Rfl:   •  losartan (COZAAR) 50 mg tablet, Take 1 tablet (50 mg total) by mouth daily, Disp: 30 tablet, Rfl: 6  •  metoprolol succinate (TOPROL-XL) 25 mg 24 hr tablet, Take 1 tablet (25 mg total) by mouth daily, Disp: 30 tablet, Rfl: 6  •  Multiple Vitamin (MULTI-VITAMIN DAILY PO), Take 1 tablet by mouth daily, Disp: , Rfl:   •  tamsulosin (FLOMAX) 0.4 mg, Take 1 capsule (0.4 mg total) by mouth daily at bedtime, Disp: 90 capsule, Rfl: 3    Laboratory Results:        Invalid input(s): "ALBUMIN"    Results for orders placed or performed in visit on 08/15/23   Lipid panel   Result Value Ref Range    Cholesterol 150 See Comment mg/dL    Triglycerides 225 (H) See Comment mg/dL    HDL, Direct 29 (L) >=40 mg/dL    LDL Calculated 76 0 - 100 mg/dL    Non-HDL-Chol (CHOL-HDL) 121 mg/dl   Hemoglobin A1C   Result Value Ref Range    Hemoglobin A1C 6.8 (H) Normal 4.0-5.6%; PreDiabetic 5.7-6.4%;  Diabetic >=6.5%; Glycemic control for adults with diabetes <7.0% %     mg/dl

## 2023-09-12 ENCOUNTER — OFFICE VISIT (OUTPATIENT)
Dept: UROLOGY | Facility: MEDICAL CENTER | Age: 73
End: 2023-09-12
Payer: COMMERCIAL

## 2023-09-12 ENCOUNTER — OFFICE VISIT (OUTPATIENT)
Dept: NEPHROLOGY | Facility: CLINIC | Age: 73
End: 2023-09-12

## 2023-09-12 VITALS
HEART RATE: 57 BPM | HEIGHT: 68 IN | OXYGEN SATURATION: 98 % | SYSTOLIC BLOOD PRESSURE: 108 MMHG | BODY MASS INDEX: 36.98 KG/M2 | WEIGHT: 244 LBS | DIASTOLIC BLOOD PRESSURE: 60 MMHG

## 2023-09-12 VITALS
SYSTOLIC BLOOD PRESSURE: 117 MMHG | HEIGHT: 68 IN | WEIGHT: 244 LBS | DIASTOLIC BLOOD PRESSURE: 59 MMHG | BODY MASS INDEX: 36.98 KG/M2 | HEART RATE: 53 BPM

## 2023-09-12 DIAGNOSIS — N18.2 STAGE 2 CHRONIC KIDNEY DISEASE: ICD-10-CM

## 2023-09-12 DIAGNOSIS — R39.12 BENIGN PROSTATIC HYPERPLASIA WITH WEAK URINARY STREAM: ICD-10-CM

## 2023-09-12 DIAGNOSIS — N13.8 BENIGN PROSTATIC HYPERPLASIA WITH URINARY OBSTRUCTION: Primary | ICD-10-CM

## 2023-09-12 DIAGNOSIS — I12.9 BENIGN HYPERTENSION WITH CHRONIC KIDNEY DISEASE, STAGE II: ICD-10-CM

## 2023-09-12 DIAGNOSIS — N28.1 RENAL CYST: ICD-10-CM

## 2023-09-12 DIAGNOSIS — E66.01 CLASS 2 SEVERE OBESITY DUE TO EXCESS CALORIES WITH SERIOUS COMORBIDITY AND BODY MASS INDEX (BMI) OF 38.0 TO 38.9 IN ADULT: ICD-10-CM

## 2023-09-12 DIAGNOSIS — N40.1 BENIGN PROSTATIC HYPERPLASIA WITH WEAK URINARY STREAM: ICD-10-CM

## 2023-09-12 DIAGNOSIS — Z12.5 PROSTATE CANCER SCREENING: ICD-10-CM

## 2023-09-12 DIAGNOSIS — N18.2 BENIGN HYPERTENSION WITH CHRONIC KIDNEY DISEASE, STAGE II: ICD-10-CM

## 2023-09-12 DIAGNOSIS — N40.1 BENIGN PROSTATIC HYPERPLASIA WITH URINARY OBSTRUCTION: Primary | ICD-10-CM

## 2023-09-12 DIAGNOSIS — R94.4 DECREASED CALCULATED GFR: Primary | ICD-10-CM

## 2023-09-12 PROCEDURE — 99214 OFFICE O/P EST MOD 30 MIN: CPT | Performed by: UROLOGY

## 2023-09-12 NOTE — ASSESSMENT & PLAN NOTE
AUA symptom score is 12 on tamsulosin. PSA was 1.76 on August 15, 2023. He is satisfied with his voiding pattern but does note bothersome rare episodes of urgency. He may be interested in pursuing further therapy. Options were discussed and we will proceed with an evaluation to include cystoscopy, transrectal ultrasound and uroflow with postvoid residual.  We will obtain a urinalysis with microscopic examination prior to his next visit.

## 2023-09-12 NOTE — PATIENT INSTRUCTIONS
Your kidney numbers look stable. Blood pressure is well controlled. Continue current medications. Repeat kidney ultrasound in next 2 months to follow-up on the kidney cyst.    Repeat blood work and urine test in 1 year or earlier as needed. Return for follow-up in 1 year.

## 2023-09-12 NOTE — PROGRESS NOTES
Assessment/Plan:    Benign prostatic hyperplasia  AUA symptom score is 12 on tamsulosin. PSA was 1.76 on August 15, 2023. He is satisfied with his voiding pattern but does note bothersome rare episodes of urgency. He may be interested in pursuing further therapy. Options were discussed and we will proceed with an evaluation to include cystoscopy, transrectal ultrasound and uroflow with postvoid residual.  We will obtain a urinalysis with microscopic examination prior to his next visit. Prostate cancer screening  PSA is normal and digital rectal examination benign in nature. Diagnoses and all orders for this visit:    Benign prostatic hyperplasia with urinary obstruction  -     Urinalysis with microscopic; Future  -     Cystoscopy; Future    Prostate cancer screening          Subjective:      Patient ID: Anson Romero is a 71 y.o. male. Benign Prostatic Hypertrophy  This is a chronic problem. The current episode started more than 1 year ago. The problem is unchanged. Irritative symptoms include nocturia (Improved 1-2 X) and urgency (occasional with rare urge incontinence). Irritative symptoms do not include frequency. Obstructive symptoms include dribbling and a weak stream. Obstructive symptoms do not include incomplete emptying, an intermittent stream, a slower stream or straining. Pertinent negatives include no chills, dysuria, genital pain, hematuria, hesitancy, nausea or vomiting. AUA score is 8-19. His sexual activity is non-contributory to the current illness. The symptoms are aggravated by caffeine. Past treatments include tamsulosin. The treatment provided moderate relief. He has been using treatment for 2 or more years.        The following portions of the patient's history were reviewed and updated as appropriate: allergies, current medications, past family history, past medical history, past social history, past surgical history and problem list.    Review of Systems   Constitutional: Negative for chills, diaphoresis, fatigue and fever. HENT: Positive for hearing loss. Eyes: Negative. Respiratory: Negative. He snores   Cardiovascular: Negative. Gastrointestinal: Negative. Negative for nausea and vomiting. Endocrine: Negative. Genitourinary: Positive for nocturia (Improved 1-2 X) and urgency (occasional with rare urge incontinence). Negative for dysuria, frequency, hematuria, hesitancy and incomplete emptying. See HPI   Musculoskeletal: Positive for myalgias. Skin: Negative. Allergic/Immunologic: Negative. Neurological: Negative. Hematological: Negative. Psychiatric/Behavioral: Negative. AUA SYMPTOM SCORE    Flowsheet Row Most Recent Value   AUA SYMPTOM SCORE    How often have you had a sensation of not emptying your bladder completely after you finished urinating? 1   How often have you had to urinate again less than two hours after you finished urinating? 3   How often have you found you stopped and started again several times when you urinate? 0   How often have you found it difficult to postpone urination? 1   How often have you had a weak urinary stream? 4   How often have you had to push or strain to begin urination? 0   How many times did you most typically get up to urinate from the time you went to bed at night until the time you got up in the morning? 3   Quality of Life: If you were to spend the rest of your life with your urinary condition just the way it is now, how would you feel about that? 2   AUA SYMPTOM SCORE 12            Objective:      /60 (BP Location: Left arm, Patient Position: Sitting, Cuff Size: Large)   Pulse 57   Ht 5' 8" (1.727 m)   Wt 111 kg (244 lb)   SpO2 98%   BMI 37.10 kg/m²          Physical Exam  Vitals reviewed. Constitutional:       General: He is not in acute distress. Appearance: Normal appearance. He is well-developed. He is obese. He is not ill-appearing, toxic-appearing or diaphoretic. HENT:      Head: Normocephalic and atraumatic. Eyes:      General: No scleral icterus. Conjunctiva/sclera: Conjunctivae normal.   Cardiovascular:      Rate and Rhythm: Normal rate. Pulmonary:      Effort: Pulmonary effort is normal.   Abdominal:      General: Bowel sounds are normal. There is no distension. Palpations: Abdomen is soft. There is no mass. Tenderness: There is no abdominal tenderness. There is no right CVA tenderness, left CVA tenderness, guarding or rebound. Hernia: No hernia is present. Genitourinary:     Penis: Normal. No phimosis or hypospadias. Testes: Normal.         Right: Mass not present. Left: Mass not present. Rectum: Normal.      Comments: Prostate 1.5 X to 2 X  enlarged and palpably benign  Musculoskeletal:         General: Normal range of motion. Cervical back: Neck supple. Skin:     General: Skin is warm and dry. Neurological:      General: No focal deficit present. Mental Status: He is alert and oriented to person, place, and time. Psychiatric:         Mood and Affect: Mood normal.         Behavior: Behavior normal.         Thought Content:  Thought content normal.         Judgment: Judgment normal.

## 2023-09-30 DIAGNOSIS — N18.2 STAGE 2 CHRONIC KIDNEY DISEASE: ICD-10-CM

## 2023-09-30 RX ORDER — LOSARTAN POTASSIUM 50 MG/1
50 TABLET ORAL DAILY
Qty: 90 TABLET | Refills: 0 | Status: SHIPPED | OUTPATIENT
Start: 2023-09-30

## 2023-10-31 ENCOUNTER — OFFICE VISIT (OUTPATIENT)
Dept: CARDIOLOGY CLINIC | Facility: CLINIC | Age: 73
End: 2023-10-31
Payer: COMMERCIAL

## 2023-10-31 VITALS
DIASTOLIC BLOOD PRESSURE: 72 MMHG | WEIGHT: 244 LBS | BODY MASS INDEX: 37.1 KG/M2 | HEART RATE: 64 BPM | OXYGEN SATURATION: 97 % | SYSTOLIC BLOOD PRESSURE: 136 MMHG

## 2023-10-31 DIAGNOSIS — R94.31 ABNORMAL ELECTROCARDIOGRAM (ECG) (EKG): ICD-10-CM

## 2023-10-31 DIAGNOSIS — I10 PRIMARY HYPERTENSION: Primary | ICD-10-CM

## 2023-10-31 PROCEDURE — 99213 OFFICE O/P EST LOW 20 MIN: CPT | Performed by: INTERNAL MEDICINE

## 2023-10-31 PROCEDURE — 93000 ELECTROCARDIOGRAM COMPLETE: CPT | Performed by: INTERNAL MEDICINE

## 2023-10-31 NOTE — PROGRESS NOTES
Cardiology Follow Up    Rosemary Harris  1950  [id number]  [address] [facility] CATH LAB  [address]  [facility]  [phone]    1. Primary hypertension  POCT ECG      2. Abnormal electrocardiogram (ECG) (EKG)  POCT ECG          Interval History: Cardiology follow-up. Patient continues to do well, he tries to exercise regularly, he rides a bike, no exertional symptoms denies any chest pain or dyspnea, states been compliant with dual sodium diet, blood pressures been well controlled ambulatory blood pressures in the 130/70 range. Denies any syncope or presyncope. Patient Active Problem List   Diagnosis    Primary osteoarthritis of right knee    Benign prostatic hyperplasia    Glaucoma    Primary hypertension    Hyperlipidemia    Prediabetes    Nocturia    Rash    Cough    COVID-19 virus infection    Class 2 severe obesity due to excess calories with serious comorbidity and body mass index (BMI) of 35.0 to 35.9 in adult     Right inguinal pain    Obesity (BMI 30-39. 9)    Ocular hypertension of left eye    Lightheadedness    JOSE LUIS (acute kidney injury) (HCC)    Dyspnea on exertion    Lymph nodes enlarged    Enlarged prostate    Normocytic anemia    Abnormal electrocardiogram (ECG) (EKG)    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(HCC)    Stage 2 chronic kidney disease    Prostate cancer screening     Past Medical History:   Diagnosis Date    Hearing loss of aging     History of total left knee replacement     HL (hearing loss)     Hx of exposure to tuberculosis     treated in past with meds    Hyperlipidemia     Hypertension     OA (osteoarthritis)     bea  knees    Ocular hypertension of left eye     Tinnitus     Use of cane as ambulatory aid     Wears glasses      Social History     Socioeconomic History    Marital status: /Civil Union     Spouse name: Not on file    Number of children: Not on file    Years of education: Not on file Highest education level: Not on file   Occupational History    Not on file   Tobacco Use    Smoking status: Former     Types: Cigarettes     Quit date: 1972     Years since quittin.3    Smokeless tobacco: Never    Tobacco comments:     per [employer]:  Never a smoker   Vaping Use    Vaping Use: Never used   Substance and Sexual Activity    Alcohol use: No    Drug use: No    Sexual activity: Not on file   Other Topics Concern    Not on file   Social History Narrative    Not on file     Social Determinants of Health     Financial Resource Strain: Not on file   Food Insecurity: Not on file   Transportation Needs: Not on file   Physical Activity: Not on file   Stress: Not on file   Social Connections: Not on file   Intimate Partner Violence: Not on file   Housing Stability: Not on file      Family History   Problem Relation Age of Onset    Diabetes Mother     COPD Father     Cancer Father         Gastric    Cancer Paternal Uncle         Gastric    Breast cancer Family     Thyroid cancer Family      Past Surgical History:   Procedure Laterality Date    ADENOIDECTOMY      CATARACT EXTRACTION Bilateral     COLONOSCOPY      JOINT REPLACEMENT Left     knee    PA ARTHRP KNE CONDYLE&PLATU MEDIAL&LAT COMPARTMENTS Left 2017    Procedure: ARTHROPLASTY KNEE TOTAL;  Surgeon: Candis Hinojosa MD;  Location: [facility];  Service: Orthopedics    PA ARTHRP KNE CONDYLE&PLATU MEDIAL&LAT COMPARTMENTS Right 2017    Procedure: ARTHROPLASTY KNEE TOTAL;  Surgeon: Candis Hinojosa MD;  Location: [facility];  Service: Orthopedics    TONSILLECTOMY      WISDOM TOOTH EXTRACTION         Current Outpatient Medications:     amLODIPine (NORVASC) 5 mg tablet, TAKE ONE TABLET BY MOUTH EVERY DAY, Disp: 90 tablet, Rfl: 3    Ascorbic Acid (vitamin C) 1000 MG tablet, Take 1,000 mg by mouth daily, Disp: , Rfl:     chlorthalidone 25 mg tablet, Take 1 tablet (25 mg total) by mouth daily, Disp: 90 tablet, Rfl: 1    dorzolamide-timolol (COSOPT) 22.3-6.8 MG/ML ophthalmic solution, 1 drop 2 (two) times a day, Disp: , Rfl:     losartan (COZAAR) 50 mg tablet, TAKE ONE TABLET BY MOUTH EVERY DAY, Disp: 90 tablet, Rfl: 0    metoprolol succinate (TOPROL-XL) 25 mg 24 hr tablet, Take 1 tablet (25 mg total) by mouth daily, Disp: 30 tablet, Rfl: 6    Multiple Vitamin (MULTI-VITAMIN DAILY PO), Take 1 tablet by mouth daily, Disp: , Rfl:     tamsulosin (FLOMAX) 0.4 mg, Take 1 capsule (0.4 mg total) by mouth daily at bedtime, Disp: 90 capsule, Rfl: 3  Allergies   Allergen Reactions    Celebrex [Celecoxib] GI Intolerance     Nausea - pt able to tolerate with Prevacid    Influenza Vaccines        "I get the flu or feel horrible for months"       Labs:  Appointment on 08/15/2023   Component Date Value    Cholesterol 08/15/2023 150     Triglycerides 08/15/2023 225 (H)     HDL, Direct 08/15/2023 29 (L)     LDL Calculated 08/15/2023 76     Non-HDL-Chol (CHOL-HDL) 08/15/2023 121     Hemoglobin A1C 08/15/2023 6.8 (H)     EAG 08/15/2023 148    Appointment on 08/15/2023   Component Date Value    WBC 08/15/2023 7.10     RBC 08/15/2023 4.44     Hemoglobin 08/15/2023 14.0     Hematocrit 08/15/2023 41.9     MCV 08/15/2023 94     MCH 08/15/2023 31.5     MCHC 08/15/2023 33.4     RDW 08/15/2023 12.6     MPV 08/15/2023 11.7     Platelets 46/20/6868 289     nRBC 08/15/2023 0     Neutrophils Relative 08/15/2023 55     Immat GRANS % 08/15/2023 0     Lymphocytes Relative 08/15/2023 32     Monocytes Relative 08/15/2023 10     Eosinophils Relative 08/15/2023 2     Basophils Relative 08/15/2023 1     Neutrophils Absolute 08/15/2023 3.96     Immature Grans Absolute 08/15/2023 0.01     Lymphocytes Absolute 08/15/2023 2.25     Monocytes Absolute 08/15/2023 0.70     Eosinophils Absolute 08/15/2023 0.13     Basophils Absolute 08/15/2023 0.05     Albumin 08/15/2023 4.1     Calcium 08/15/2023 9.6     Phosphorus 08/15/2023 2.6     BUN 08/15/2023 23     Creatinine 08/15/2023 1.17     Sodium 08/15/2023 137 Potassium 08/15/2023 3.7     Chloride 08/15/2023 102     CO2 08/15/2023 27     ANION GAP 08/15/2023 8     eGFR 08/15/2023 62     Glucose, Fasting 08/15/2023 127 (H)     Creatinine, Ur 08/15/2023 94.0     Protein Urine Random 08/15/2023 8     Prot/Creat Ratio, Ur 08/15/2023 0.09     Creatinine, Ur 08/15/2023 94.0     Albumin,U,Random 08/15/2023 5.5     Albumin Creat Ratio 08/15/2023 6     PSA 08/15/2023 1.76      Imaging: No results found. Review of Systems:  Review of Systems   Constitutional:  Negative for activity change and fatigue. Respiratory:  Negative for apnea, shortness of breath, wheezing and stridor. Cardiovascular:  Positive for leg swelling. Negative for chest pain and palpitations. Neurological:  Negative for dizziness and syncope. Physical Exam:  Physical Exam  Vitals reviewed. Constitutional:       General: He is not in acute distress. Appearance: Normal appearance. He is obese. He is not ill-appearing, toxic-appearing or diaphoretic. Neck:      Vascular: No carotid bruit. Cardiovascular:      Rate and Rhythm: Normal rate and regular rhythm. Pulses: Normal pulses. Heart sounds: Normal heart sounds. No murmur heard. No friction rub. No gallop. Pulmonary:      Effort: Pulmonary effort is normal. No respiratory distress. Breath sounds: Normal breath sounds. No stridor. No wheezing, rhonchi or rales. Musculoskeletal:      Right lower leg: Edema present. Left lower leg: Edema present. Skin:     General: Skin is warm and dry. Capillary Refill: Capillary refill takes less than 2 seconds. Neurological:      Mental Status: He is alert. Psychiatric:         Mood and Affect: Mood normal.         Discussion/Summary: Hypertension, essential, longstanding over 4 decades, currently well controlled on medications plus lifestyle changes.   He does have lower extremity edema, likely worsened by calcium channel blocker therapy, he is on a diuretic as well.  Dasia Oglesby no changes, postural maneuvers were recommended, consideration for compression stockings. Stress test 2022, he did not minutes of Ryan protocol achieving target rate, there were no EKG criteria for ischemia or symptoms. Holter monitor revealed normal sinus rhythm, there was mild noncomplex ectopy. No tachyarrhythmias or bradyarrhythmias. An echocardiogram revealed normal left ventricular solid function with mild aortic sclerosis and mild mitral and tricuspid sufficiency with estimated normal pulmonary pressures suggested by Doppler criteria. 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, he was treated with antibiotics, he is BNP was elevated and was bradycardic, however no high-grade block was noted. He did have a mild first-degree AV block at that time, currently resolved. No new recommendations, lipids this year total cholesterol 150, HDL 24 LDL 76. This note was completed in part utilizing [employer] direct voice recognition software. Grammatical errors, random word insertion, spelling mistakes, and incomplete sentences may be an occasional consequence of the system secondary to software limitations, ambient noise and hardware issues. At the time of dictation, efforts were made to edit, clarify and /or correct errors. Please read the chart carefully and recognize, using context, where substitutions have occurred. If you have any questions or concerns about the context, text or information contained within the body of this dictation, please contact myself, the provider, for further clarification.

## 2023-11-06 ENCOUNTER — HOSPITAL ENCOUNTER (OUTPATIENT)
Dept: ULTRASOUND IMAGING | Facility: MEDICAL CENTER | Age: 73
Discharge: HOME | End: 2023-11-06
Payer: COMMERCIAL

## 2023-11-06 DIAGNOSIS — N28.1 RENAL CYST: ICD-10-CM

## 2023-11-06 DIAGNOSIS — N18.2 STAGE 2 CHRONIC KIDNEY DISEASE: ICD-10-CM

## 2023-11-06 DIAGNOSIS — I12.9 BENIGN HYPERTENSION WITH CHRONIC KIDNEY DISEASE, STAGE II: ICD-10-CM

## 2023-11-06 DIAGNOSIS — N18.2 BENIGN HYPERTENSION WITH CHRONIC KIDNEY DISEASE, STAGE II: ICD-10-CM

## 2023-11-06 PROCEDURE — 76770 US EXAM ABDO BACK WALL COMP: CPT

## 2023-11-11 PROBLEM — Z12.5 PROSTATE CANCER SCREENING: Status: RESOLVED | Noted: 2023-09-12 | Resolved: 2023-11-11

## 2023-11-13 DIAGNOSIS — N28.1 KIDNEY CYST, ACQUIRED: Primary | ICD-10-CM

## 2023-11-14 ENCOUNTER — TELEPHONE (OUTPATIENT)
Dept: NEPHROLOGY | Facility: CLINIC | Age: 73
End: 2023-11-14

## 2023-11-14 NOTE — TELEPHONE ENCOUNTER
----- Message from Hima Pyle MD sent at 11/14/2023 10:48 AM EST -----  Regarding: FW: [facility] patient  Please let the patient know that I have discussed the finding of bladder outpocketing with his urologist and he agrees that better evaluation of that would be with a cystoscopy which was already previously suggested. Can follow-up with the urologist regarding scheduling a cystoscopy for further evaluation. Please advise him that a repeat kidney ultrasound has been ordered to be done in 1 year from now. Thank you.  ----- Message -----  From: Orlando Suggs MD  Sent: 11/13/2023  12:41 PM EST  To: Hima Pyle MD  Subject: RE: [facility] patient                               Yes, cystoscopy should be enough for evaluation of the bladder diverticulum. ----- Message -----  From: Hima Pyle MD  Sent: 11/13/2023   9:52 AM EST  To: Orlando Suggs MD  Subject: [facility] patient                                   Rebecca Galan Guess follows with me for mild CKD. On his kidney ultrasound there is a mention of bladder diverticulum. I have noticed that you have planned to do a cystoscopy. Would that be enough as part of evaluation for his bladder diverticulum. Let me know of your thoughts. Thank you.

## 2023-11-24 ENCOUNTER — TELEPHONE (OUTPATIENT)
Dept: UROLOGY | Facility: MEDICAL CENTER | Age: 73
End: 2023-11-24

## 2023-11-27 ENCOUNTER — PROCEDURE VISIT (OUTPATIENT)
Dept: UROLOGY | Facility: MEDICAL CENTER | Age: 73
End: 2023-11-27
Payer: COMMERCIAL

## 2023-11-27 VITALS
WEIGHT: 249 LBS | OXYGEN SATURATION: 97 % | SYSTOLIC BLOOD PRESSURE: 138 MMHG | HEIGHT: 68 IN | BODY MASS INDEX: 37.74 KG/M2 | DIASTOLIC BLOOD PRESSURE: 86 MMHG | HEART RATE: 61 BPM

## 2023-11-27 DIAGNOSIS — N40.1 BENIGN PROSTATIC HYPERPLASIA WITH URINARY OBSTRUCTION: Primary | ICD-10-CM

## 2023-11-27 DIAGNOSIS — N40.1 BENIGN PROSTATIC HYPERPLASIA WITH LOWER URINARY TRACT SYMPTOMS, SYMPTOM DETAILS UNSPECIFIED: ICD-10-CM

## 2023-11-27 DIAGNOSIS — N13.8 BENIGN PROSTATIC HYPERPLASIA WITH URINARY OBSTRUCTION: Primary | ICD-10-CM

## 2023-11-27 LAB
SL AMB  POCT GLUCOSE, UA: NORMAL
SL AMB LEUKOCYTE ESTERASE,UA: NORMAL
SL AMB POCT BILIRUBIN,UA: NORMAL
SL AMB POCT BLOOD,UA: NORMAL
SL AMB POCT CLARITY,UA: CLEAR
SL AMB POCT COLOR,UA: YELLOW
SL AMB POCT KETONES,UA: NORMAL
SL AMB POCT NITRITE,UA: NORMAL
SL AMB POCT PH,UA: 5.5
SL AMB POCT SPECIFIC GRAVITY,UA: 1.02
SL AMB POCT URINE PROTEIN: NORMAL
SL AMB POCT UROBILINOGEN: 0.2

## 2023-11-27 PROCEDURE — 81003 URINALYSIS AUTO W/O SCOPE: CPT | Performed by: UROLOGY

## 2023-11-27 PROCEDURE — 52000 CYSTOURETHROSCOPY: CPT | Performed by: UROLOGY

## 2023-11-27 PROCEDURE — 76872 US TRANSRECTAL: CPT | Performed by: UROLOGY

## 2023-11-27 PROCEDURE — 99214 OFFICE O/P EST MOD 30 MIN: CPT | Performed by: UROLOGY

## 2023-11-27 RX ORDER — CEPHALEXIN 250 MG/1
CAPSULE ORAL
COMMUNITY
Start: 2023-11-14 | End: 2023-12-07

## 2023-11-27 RX ORDER — FINASTERIDE 5 MG/1
5 TABLET, FILM COATED ORAL DAILY
Qty: 90 TABLET | Refills: 3 | Status: SHIPPED | OUTPATIENT
Start: 2023-11-27

## 2023-11-27 RX ORDER — TAMSULOSIN HYDROCHLORIDE 0.4 MG/1
0.4 CAPSULE ORAL
Qty: 90 CAPSULE | Refills: 3 | Status: SHIPPED | OUTPATIENT
Start: 2023-11-27

## 2023-11-27 NOTE — ASSESSMENT & PLAN NOTE
AUA symptom score is 14. SA was 1.76 in August 2023. At this point he is satisfied with his voiding pattern. Work-up reveals a very large prostate with a median lobe. Options were discussed. Specifically in light of prostate size we discussed TURP, PVP/greenlight laser, HoLEP and robotic simple prostatectomy. The patient does not feel sufficiently bothered with his voiding pattern to pursue these therapies at this time. I did recommend he consider finasteride to decrease size and perhaps help his voiding symptoms further. Risks were discussed. He will begin finasteride in addition to his tamsulosin. He will return in 6 months.

## 2023-11-27 NOTE — PROGRESS NOTES
Assessment/Plan:    Benign prostatic hyperplasia  AUA symptom score is 14. SA was 1.76 in August 2023. At this point he is satisfied with his voiding pattern. Work-up reveals a very large prostate with a median lobe. Options were discussed. Specifically in light of prostate size we discussed TURP, PVP/greenlight laser, HoLEP and robotic simple prostatectomy. The patient does not feel sufficiently bothered with his voiding pattern to pursue these therapies at this time. I did recommend he consider finasteride to decrease size and perhaps help his voiding symptoms further. Risks were discussed. He will begin finasteride in addition to his tamsulosin. He will return in 6 months. Diagnoses and all orders for this visit:    Benign prostatic hyperplasia with urinary obstruction  -     POCT urine dip auto non-scope  -     finasteride (PROSCAR) 5 mg tablet; Take 1 tablet (5 mg total) by mouth daily  -     tamsulosin (FLOMAX) 0.4 mg; Take 1 capsule (0.4 mg total) by mouth daily at bedtime    Benign prostatic hyperplasia with lower urinary tract symptoms, symptom details unspecified    Other orders  -     cephalexin (KEFLEX) 250 mg capsule;  (Patient not taking: Reported on 11/27/2023)  -     Cystoscopy          Subjective:      Patient ID: Stacey Luna is a 68 y.o. male. Benign Prostatic Hypertrophy  This is a chronic problem. The current episode started more than 1 year ago. The problem is unchanged. Irritative symptoms include nocturia (Improved 1-2 X) and urgency (occasional with rare urge incontinence). Irritative symptoms do not include frequency. Obstructive symptoms include dribbling and a weak stream. Obstructive symptoms do not include incomplete emptying, an intermittent stream, a slower stream or straining. Pertinent negatives include no chills, dysuria, genital pain, hematuria, hesitancy, nausea or vomiting. AUA score is 8-19. His sexual activity is non-contributory to the current illness.  The symptoms are aggravated by caffeine. Past treatments include tamsulosin. The treatment provided moderate relief. He has been using treatment for 2 or more years. No changes noted since his last visit. He is here for cysto and further evaluation. The following portions of the patient's history were reviewed and updated as appropriate: allergies, current medications, past family history, past medical history, past social history, past surgical history, and problem list.    Review of Systems   Constitutional:  Negative for chills, diaphoresis, fatigue and fever. HENT: Negative. Eyes: Negative. Respiratory: Negative. Cardiovascular: Negative. Endocrine: Negative. Genitourinary:         See HPI   Musculoskeletal: Negative. Skin: Negative. Allergic/Immunologic: Negative. Neurological: Negative. Hematological: Negative. Psychiatric/Behavioral: Negative. AUA SYMPTOM SCORE      Flowsheet Row Most Recent Value   AUA SYMPTOM SCORE    How often have you had a sensation of not emptying your bladder completely after you finished urinating? 1   How often have you had to urinate again less than two hours after you finished urinating? 3   How often have you found you stopped and started again several times when you urinate? 1   How often have you found it difficult to postpone urination? 2   How often have you had a weak urinary stream? 4   How often have you had to push or strain to begin urination? 0   How many times did you most typically get up to urinate from the time you went to bed at night until the time you got up in the morning? 3   Quality of Life: If you were to spend the rest of your life with your urinary condition just the way it is now, how would you feel about that? 2   AUA SYMPTOM SCORE 14           Objective:      /86   Pulse 61   Ht 5' 8" (1.727 m)   Wt 113 kg (249 lb)   SpO2 97%   BMI 37.86 kg/m²          Physical Exam  Vitals reviewed. Constitutional:       General: He is not in acute distress. Appearance: Normal appearance. He is well-developed. He is not ill-appearing, toxic-appearing or diaphoretic. HENT:      Head: Normocephalic and atraumatic. Eyes:      General: No scleral icterus. Conjunctiva/sclera: Conjunctivae normal.   Cardiovascular:      Rate and Rhythm: Normal rate. Pulmonary:      Effort: Pulmonary effort is normal.   Abdominal:      General: Bowel sounds are normal. There is no distension. Palpations: Abdomen is soft. There is no mass. Tenderness: There is no abdominal tenderness. There is no right CVA tenderness, left CVA tenderness, guarding or rebound. Genitourinary:     Penis: Normal. No phimosis or hypospadias. Testes: Normal.         Right: Mass not present. Left: Mass not present. Rectum: Normal.      Comments: Moderately enlarged and palpably benign prostate  Musculoskeletal:         General: Normal range of motion. Cervical back: Neck supple. Skin:     General: Skin is warm and dry. Neurological:      General: No focal deficit present. Mental Status: He is alert and oriented to person, place, and time. Psychiatric:         Mood and Affect: Mood normal.         Behavior: Behavior normal.         Thought Content: Thought content normal.         Judgment: Judgment normal.                  Cystoscopy     Date/Time  11/27/2023 10:00 AM     Performed by  Inder Vann MD   Authorized by  Inder Vann MD     Universal Protocol:  Consent: Verbal consent obtained. Written consent obtained.   Risks and benefits: risks, benefits and alternatives were discussed  Consent given by: patient  Patient understanding: patient states understanding of the procedure being performed  Patient consent: the patient's understanding of the procedure matches consent given  Procedure consent: procedure consent matches procedure scheduled  Patient identity confirmed: verbally with patient      Procedure Details:  Procedure type: cystoscopy    Patient tolerance: Patient tolerated the procedure well with no immediate complications    Additional Procedure Details: The patient presents for cystoscopy, transrectal ultrasound and uroflow study for further evaluation of his lower urinary tract symptoms. I have discussed the reasons for the testing and the potential risks and complications. Patient expressed understanding, and signed informed consent document. Flexible Cystoscopy    The patient was carefully  positioned supine on the examining table. He was then prepped and draped in the usual sterile fashion. .  Xylocaine jelly was instilled in the urethra and left  indwelling to affect local anesthesia. Flexible cystoscopy was then performed with the 15 [place] flexible cystoscope with the following findings. Urethra: normal without stricture    Prostate:  lateral lobes - bilobar obstruction                  median lobe - large median lobe    Bladder: trabeculated with diverticula, no lesions, tumor, or stones. Orifices difficult to see. Residual urine:moderate    The patient tolerated the cystoscopy well. He was placed on his left side in preparation for transrectal ultrasound of prostate. Transrectal Ultrasound of the Prostate    Transrectal ultrasound was performed with the transrectal probe at 8 megahertz. Digital rectal examination was performed with findings of benign feeling prostate. The lubricated transrectal probe inserted into the rectum. The seminal vessels are normal in appearance. The prostate is enlarged measuring 80 gm. Prostatic height is 5.6 cm, length 4.7 cm and width 5.8 cm. No hypoechoic lesion is noted in the peripheral zone. The transition zone is heterogeneous in echotexture with scattered cysts and calcifications. A median lobe is  seen. Visualized bladder is unremarkable.     Impression:  Prostatic enlargement        Uroflow study    The patient voided 246 cc with a maximum flow rate of 5.1 ml/sec and an average flow rate of 2.8 ml/sec. The overall voiding pattern is prolonged. Postvoid residual is 101 cc. Impression: consistent with outlet obstruction.

## 2023-11-27 NOTE — LETTER
November 27, 2023     Max Derald Simmonds, DO  [address] [place] [address] Drive  [address]    Patient: Kathleen Gurrola   YOB: 1950   Date of Visit: 11/27/2023       Dear Dr. Enedina Traore:    Thank you for referring Kathleen Gurrola to me for evaluation. Below are my notes for this consultation. If you have questions, please do not hesitate to call me. I look forward to following your patient along with you. Sincerely,        Florian Pitts MD        CC: No Recipients    Florian Pitts MD  11/27/2023 10:52 AM  Sign when Signing Visit  Assessment/Plan:    Benign prostatic hyperplasia  AUA symptom score is 14. SA was 1.76 in August 2023. At this point he is satisfied with his voiding pattern. Work-up reveals a very large prostate with a median lobe. Options were discussed. Specifically in light of prostate size we discussed TURP, PVP/greenlight laser, HoLEP and robotic simple prostatectomy. The patient does not feel sufficiently bothered with his voiding pattern to pursue these therapies at this time. I did recommend he consider finasteride to decrease size and perhaps help his voiding symptoms further. Risks were discussed. He will begin finasteride in addition to his tamsulosin. He will return in 6 months. Diagnoses and all orders for this visit:    Benign prostatic hyperplasia with urinary obstruction  -     POCT urine dip auto non-scope  -     finasteride (PROSCAR) 5 mg tablet; Take 1 tablet (5 mg total) by mouth daily  -     tamsulosin (FLOMAX) 0.4 mg; Take 1 capsule (0.4 mg total) by mouth daily at bedtime    Benign prostatic hyperplasia with lower urinary tract symptoms, symptom details unspecified    Other orders  -     cephalexin (KEFLEX) 250 mg capsule;  (Patient not taking: Reported on 11/27/2023)  -     Cystoscopy          Subjective:      Patient ID: Kathleen Gurrola is a 68 y.o. male. Benign Prostatic Hypertrophy  This is a chronic problem.  The current episode started more than 1 year ago. The problem is unchanged. Irritative symptoms include nocturia (Improved 1-2 X) and urgency (occasional with rare urge incontinence). Irritative symptoms do not include frequency. Obstructive symptoms include dribbling and a weak stream. Obstructive symptoms do not include incomplete emptying, an intermittent stream, a slower stream or straining. Pertinent negatives include no chills, dysuria, genital pain, hematuria, hesitancy, nausea or vomiting. AUA score is 8-19. His sexual activity is non-contributory to the current illness. The symptoms are aggravated by caffeine. Past treatments include tamsulosin. The treatment provided moderate relief. He has been using treatment for 2 or more years. No changes noted since his last visit. He is here for cysto and further evaluation. The following portions of the patient's history were reviewed and updated as appropriate: allergies, current medications, past family history, past medical history, past social history, past surgical history, and problem list.    Review of Systems   Constitutional:  Negative for chills, diaphoresis, fatigue and fever. HENT: Negative. Eyes: Negative. Respiratory: Negative. Cardiovascular: Negative. Endocrine: Negative. Genitourinary:         See HPI   Musculoskeletal: Negative. Skin: Negative. Allergic/Immunologic: Negative. Neurological: Negative. Hematological: Negative. Psychiatric/Behavioral: Negative. AUA SYMPTOM SCORE      Flowsheet Row Most Recent Value   AUA SYMPTOM SCORE    How often have you had a sensation of not emptying your bladder completely after you finished urinating? 1   How often have you had to urinate again less than two hours after you finished urinating? 3   How often have you found you stopped and started again several times when you urinate? 1   How often have you found it difficult to postpone urination?  2   How often have you had a weak urinary stream? 4   How often have you had to push or strain to begin urination? 0   How many times did you most typically get up to urinate from the time you went to bed at night until the time you got up in the morning? 3   Quality of Life: If you were to spend the rest of your life with your urinary condition just the way it is now, how would you feel about that? 2   AUA SYMPTOM SCORE 14           Objective:      /86   Pulse 61   Ht 5' 8" (1.727 m)   Wt 113 kg (249 lb)   SpO2 97%   BMI 37.86 kg/m²          Physical Exam  Vitals reviewed. Constitutional:       General: He is not in acute distress. Appearance: Normal appearance. He is well-developed. He is not ill-appearing, toxic-appearing or diaphoretic. HENT:      Head: Normocephalic and atraumatic. Eyes:      General: No scleral icterus. Conjunctiva/sclera: Conjunctivae normal.   Cardiovascular:      Rate and Rhythm: Normal rate. Pulmonary:      Effort: Pulmonary effort is normal.   Abdominal:      General: Bowel sounds are normal. There is no distension. Palpations: Abdomen is soft. There is no mass. Tenderness: There is no abdominal tenderness. There is no right CVA tenderness, left CVA tenderness, guarding or rebound. Genitourinary:     Penis: Normal. No phimosis or hypospadias. Testes: Normal.         Right: Mass not present. Left: Mass not present. Rectum: Normal.      Comments: Moderately enlarged and palpably benign prostate  Musculoskeletal:         General: Normal range of motion. Cervical back: Neck supple. Skin:     General: Skin is warm and dry. Neurological:      General: No focal deficit present. Mental Status: He is alert and oriented to person, place, and time. Psychiatric:         Mood and Affect: Mood normal.         Behavior: Behavior normal.         Thought Content:  Thought content normal.         Judgment: Judgment normal.                  Cystoscopy Date/Time  11/27/2023 10:00 AM     Performed by  Inessa Samuels MD   Authorized by  Inessa Samuels MD     Universal Protocol:  Consent: Verbal consent obtained. Written consent obtained. Risks and benefits: risks, benefits and alternatives were discussed  Consent given by: patient  Patient understanding: patient states understanding of the procedure being performed  Patient consent: the patient's understanding of the procedure matches consent given  Procedure consent: procedure consent matches procedure scheduled  Patient identity confirmed: verbally with patient      Procedure Details:  Procedure type: cystoscopy    Patient tolerance: Patient tolerated the procedure well with no immediate complications    Additional Procedure Details: The patient presents for cystoscopy, transrectal ultrasound and uroflow study for further evaluation of his lower urinary tract symptoms. I have discussed the reasons for the testing and the potential risks and complications. Patient expressed understanding, and signed informed consent document. Flexible Cystoscopy    The patient was carefully  positioned supine on the examining table. He was then prepped and draped in the usual sterile fashion. .  Xylocaine jelly was instilled in the urethra and left  indwelling to affect local anesthesia. Flexible cystoscopy was then performed with the 15 Algerian flexible cystoscope with the following findings. Urethra: normal without stricture    Prostate:  lateral lobes - bilobar obstruction                  median lobe - large median lobe    Bladder: trabeculated with diverticula, no lesions, tumor, or stones. Orifices difficult to see. Residual urine:moderate    The patient tolerated the cystoscopy well. He was placed on his left side in preparation for transrectal ultrasound of prostate. Transrectal Ultrasound of the Prostate    Transrectal ultrasound was performed with the transrectal probe at 8 megahertz.   Digital rectal examination was performed with findings of benign feeling prostate. The lubricated transrectal probe inserted into the rectum. The seminal vessels are normal in appearance. The prostate is enlarged measuring 80 gm. Prostatic height is 5.6 cm, length 4.7 cm and width 5.8 cm. No hypoechoic lesion is noted in the peripheral zone. The transition zone is heterogeneous in echotexture with scattered cysts and calcifications. A median lobe is  seen. Visualized bladder is unremarkable. Impression:  Prostatic enlargement        Uroflow study    The patient voided 246 cc with a maximum flow rate of 5.1 ml/sec and an average flow rate of 2.8 ml/sec. The overall voiding pattern is prolonged. Postvoid residual is 101 cc. Impression: consistent with outlet obstruction.

## 2023-11-30 ENCOUNTER — TELEMEDICINE (OUTPATIENT)
Dept: INTERNAL MEDICINE CLINIC | Facility: CLINIC | Age: 73
End: 2023-11-30
Payer: COMMERCIAL

## 2023-11-30 DIAGNOSIS — U07.1 COVID-19 VIRUS INFECTION: Primary | ICD-10-CM

## 2023-11-30 PROCEDURE — 99214 OFFICE O/P EST MOD 30 MIN: CPT | Performed by: STUDENT IN AN ORGANIZED HEALTH CARE EDUCATION/TRAINING PROGRAM

## 2023-11-30 RX ORDER — NIRMATRELVIR AND RITONAVIR 300-100 MG
3 KIT ORAL 2 TIMES DAILY
Qty: 30 TABLET | Refills: 0 | Status: SHIPPED | OUTPATIENT
Start: 2023-11-30 | End: 2023-12-05

## 2023-11-30 NOTE — PROGRESS NOTES
Virtual Regular Visit    Verification of patient location:    Patient is located at Home in the following state in which I hold an active license [place]      Assessment/Plan:    Problem List Items Addressed This Visit          Other    COVID-19 virus infection - Primary     Symptoms started yesterday with "slugish" feeling in the afternoon, achy body overnight, + nasal congestion. Mild sore throat, mild cough,   Temp of 100.3F this morning. Denies SOB, chest congestion, chest tightness, CP, n/v/d/abd pain  COVID + today with home Ag test   -Mild case. Stable.  -We discussed antiviral treatment including its adverse effects profile and we have decided to proceed with Paxlovid (GFR >60). -Advised to use Tylenol or ibuprofen for fevers and/or myalgias, OTC cold medications for symptomatic treatment  - Patient advised on isolation precautions and to monitor symptoms for worsening/complications and to contact our office or seek medical care in the emergency room if symptoms are severe           Relevant Medications    nirmatrelvir & ritonavir (Paxlovid, 300/100,) tablet therapy pack            Reason for visit is   Chief Complaint   Patient presents with    COVID-19     C    Virtual Regular Visit          Encounter provider Kb Hancock MD    Provider located at [address] [place] [address] Road [id number]      Recent Visits  No visits were found meeting these conditions. Showing recent visits within past 7 days and meeting all other requirements  Today's Visits  Date Type Provider Dept   11/30/23 Telemedicine Kb Hancock MD  Internal [facility]   Showing today's visits and meeting all other requirements  Future Appointments  No visits were found meeting these conditions. Showing future appointments within next 150 days and meeting all other requirements       The patient was identified by name and date of birth.  Inessa Sharp was informed that this is a telemedicine visit and that the visit is being conducted through the [employer]. He agrees to proceed. .  My office door was closed. No one else was in the room. He acknowledged consent and understanding of privacy and security of the video platform. The patient has agreed to participate and understands they can discontinue the visit at any time. Patient is aware this is a billable service. Subjective  Kathleen Gurrola is a 68 y.o. male \ .       HPI     Past Medical History:   Diagnosis Date    Hearing loss of aging     History of total left knee replacement     HL (hearing loss)     Hx of exposure to tuberculosis     treated in past with meds    Hyperlipidemia     Hypertension     OA (osteoarthritis)     bea  knees    Ocular hypertension of left eye     Tinnitus     Use of cane as ambulatory aid     Wears glasses        Past Surgical History:   Procedure Laterality Date    ADENOIDECTOMY      CATARACT EXTRACTION Bilateral     COLONOSCOPY      JOINT REPLACEMENT Left     knee    [place] ARTHRP KNE CONDYLE&PLATU MEDIAL&LAT COMPARTMENTS Left 7/14/2017    Procedure: ARTHROPLASTY KNEE TOTAL;  Surgeon: Bipin Weinberg MD;  Location: [facility];  Service: Orthopedics    [place] ARTHRP KNE CONDYLE&PLATU MEDIAL&LAT COMPARTMENTS Right 9/5/2017    Procedure: ARTHROPLASTY KNEE TOTAL;  Surgeon: Bipin Weinberg MD;  Location: [facility];  Service: Orthopedics    TONSILLECTOMY      WISDOM TOOTH EXTRACTION         Current Outpatient Medications   Medication Sig Dispense Refill    nirmatrelvir & ritonavir (Paxlovid, 300/100,) tablet therapy pack Take 3 tablets by mouth 2 (two) times a day for 5 days Take 2 nirmatrelvir tablets + 1 ritonavir tablet together per dose 30 tablet 0    amLODIPine (NORVASC) 5 mg tablet TAKE ONE TABLET BY MOUTH EVERY DAY 90 tablet 3    Ascorbic Acid (vitamin C) 1000 MG tablet Take 1,000 mg by mouth daily      cephalexin (KEFLEX) 250 mg capsule  (Patient not taking: Reported on 11/27/2023) chlorthalidone 25 mg tablet Take 1 tablet (25 mg total) by mouth daily 90 tablet 1    dorzolamide-timolol (COSOPT) 22.3-6.8 MG/ML ophthalmic solution 1 drop 2 (two) times a day      finasteride (PROSCAR) 5 mg tablet Take 1 tablet (5 mg total) by mouth daily 90 tablet 3    losartan (COZAAR) 50 mg tablet TAKE ONE TABLET BY MOUTH EVERY DAY 90 tablet 0    metoprolol succinate (TOPROL-XL) 25 mg 24 hr tablet Take 1 tablet (25 mg total) by mouth daily 30 tablet 6    Multiple Vitamin (MULTI-VITAMIN DAILY PO) Take 1 tablet by mouth daily      tamsulosin (FLOMAX) 0.4 mg Take 1 capsule (0.4 mg total) by mouth daily at bedtime 90 capsule 3     No current facility-administered medications for this visit. Allergies   Allergen Reactions    Celebrex [Celecoxib] GI Intolerance     Nausea - pt able to tolerate with Prevacid    Influenza Vaccines        "I get the flu or feel horrible for months"       Review of Systems    Video Exam    There were no vitals filed for this visit.     Physical Exam     Visit Time  Total Visit Duration: 10

## 2023-11-30 NOTE — ASSESSMENT & PLAN NOTE
Symptoms started yesterday with "slugish" feeling in the afternoon, achy body overnight, + nasal congestion. Mild sore throat, mild cough,   Temp of 100.3F this morning. Denies SOB, chest congestion, chest tightness, CP, n/v/d/abd pain  COVID + today with home Ag test   -Mild case. Stable.  -We discussed antiviral treatment including its adverse effects profile and we have decided to proceed with Paxlovid (GFR >60).   -Advised to use Tylenol or ibuprofen for fevers and/or myalgias, OTC cold medications for symptomatic treatment  - Patient advised on isolation precautions and to monitor symptoms for worsening/complications and to contact our office or seek medical care in the emergency room if symptoms are severe

## 2023-12-07 ENCOUNTER — TELEPHONE (OUTPATIENT)
Dept: INTERNAL MEDICINE CLINIC | Facility: CLINIC | Age: 73
End: 2023-12-07

## 2023-12-07 ENCOUNTER — APPOINTMENT (OUTPATIENT)
Dept: LAB | Facility: MEDICAL CENTER | Age: 73
End: 2023-12-07
Payer: COMMERCIAL

## 2023-12-07 ENCOUNTER — NURSE TRIAGE (OUTPATIENT)
Age: 73
End: 2023-12-07

## 2023-12-07 DIAGNOSIS — R39.9 UTI SYMPTOMS: Primary | ICD-10-CM

## 2023-12-07 DIAGNOSIS — R39.9 UTI SYMPTOMS: ICD-10-CM

## 2023-12-07 DIAGNOSIS — Z00.00 ANNUAL PHYSICAL EXAM: ICD-10-CM

## 2023-12-07 DIAGNOSIS — K64.9 HEMORRHOIDS, UNSPECIFIED HEMORRHOID TYPE: Primary | ICD-10-CM

## 2023-12-07 DIAGNOSIS — N40.1 BENIGN PROSTATIC HYPERPLASIA WITH URINARY OBSTRUCTION: ICD-10-CM

## 2023-12-07 DIAGNOSIS — N13.8 BENIGN PROSTATIC HYPERPLASIA WITH URINARY OBSTRUCTION: ICD-10-CM

## 2023-12-07 LAB
BACTERIA UR QL AUTO: ABNORMAL /HPF
BILIRUB UR QL STRIP: NEGATIVE
CLARITY UR: CLEAR
COLOR UR: YELLOW
GLUCOSE UR STRIP-MCNC: NEGATIVE MG/DL
HGB UR QL STRIP.AUTO: NEGATIVE
KETONES UR STRIP-MCNC: NEGATIVE MG/DL
LEUKOCYTE ESTERASE UR QL STRIP: ABNORMAL
NITRITE UR QL STRIP: NEGATIVE
NON-SQ EPI CELLS URNS QL MICRO: ABNORMAL /HPF
PH UR STRIP.AUTO: 5.5 [PH]
PROT UR STRIP-MCNC: ABNORMAL MG/DL
RBC #/AREA URNS AUTO: ABNORMAL /HPF
SP GR UR STRIP.AUTO: 1.02 (ref 1–1.03)
UROBILINOGEN UR STRIP-ACNC: <2 MG/DL
WBC #/AREA URNS AUTO: ABNORMAL /HPF

## 2023-12-07 PROCEDURE — 87186 SC STD MICRODIL/AGAR DIL: CPT

## 2023-12-07 PROCEDURE — 87086 URINE CULTURE/COLONY COUNT: CPT

## 2023-12-07 PROCEDURE — 87077 CULTURE AEROBIC IDENTIFY: CPT

## 2023-12-07 PROCEDURE — 81001 URINALYSIS AUTO W/SCOPE: CPT

## 2023-12-07 PROCEDURE — 87147 CULTURE TYPE IMMUNOLOGIC: CPT

## 2023-12-07 RX ORDER — DIAPER,BRIEF,INFANT-TODD,DISP
EACH MISCELLANEOUS 4 TIMES DAILY PRN
Qty: 114 G | Refills: 0 | Status: SHIPPED | OUTPATIENT
Start: 2023-12-07

## 2023-12-07 RX ORDER — CEPHALEXIN 500 MG/1
500 CAPSULE ORAL EVERY 12 HOURS SCHEDULED
Qty: 20 CAPSULE | Refills: 0 | Status: SHIPPED | OUTPATIENT
Start: 2023-12-07 | End: 2023-12-17

## 2023-12-07 NOTE — TELEPHONE ENCOUNTER
Call placed to patient and spoke with him. Informed him of the AP recommendations. Pt is on his way now to submit urine sample and will plan to take medication after as directed.

## 2023-12-07 NOTE — TELEPHONE ENCOUNTER
Per Dr. Rachel Quevedo he would like pt to take tums,tylenol,or Ibprofen, and rectal cream  Called pt detailed message given

## 2023-12-07 NOTE — TELEPHONE ENCOUNTER
Spoke to Dr. Ariadne Fleming and he would like for the pt to continue with the antibiotic given by the urologist. He would like pt to call the urologist. [facility] urology spoke to [facility] and she stated that she is waiting for urine results.

## 2023-12-07 NOTE — TELEPHONE ENCOUNTER
Antibiotics sent. Please make sure patient gives urine sample prior to starting antibiotics.  Thanks

## 2023-12-07 NOTE — TELEPHONE ENCOUNTER
Pt of Dr. Kaitlynn Torre in [facility]. Last seen on 11/27/23 h/o BPH and had cysto        Patient called had cysto done last week and now having UTI symptoms. Reports burning with urination and frequency for past 4 days. Pt denies fever, chills, nausea or vomiting. Pt reports symptoms increasing. Orders placed for urine testing. Reason for Disposition   Urinating more frequently than usual (i.e., frequency)    Answer Assessment - Initial Assessment Questions  1. SYMPTOM: "What's the main symptom you're concerned about?" (e.g., frequency, incontinence)      Burning and frequency  2. ONSET: "When did the  symptoms  start?"      About 4 days ago  3. PAIN: "Is there any pain?" If Yes, ask: "How bad is it?" (Scale: 1-10; mild, moderate, severe)  mild  4. CAUSE: "What do you think is causing the symptoms?"      uti  5.  OTHER SYMPTOMS: "Do you have any other symptoms?" (e.g., fever, flank pain, blood in urine, pain with urination)      denies    Protocols used: Urinary Symptoms-ADULT-[place]

## 2023-12-07 NOTE — TELEPHONE ENCOUNTER
Gaston Canseco from [facility] office called stating patient called them in regards to having black stools. She was not sure if this is something they need to addressed. She stated patient had a procedure done by our office on 11/27/23. I informed her we are addressing his uti symptoms and patient went to urine test and results are still pending. As soon as results are in  the office will call patient and asses his issue with the uti symptoms he is having after cysto procedure on 11/27. Triage nurse recommended for patient to call his pcp for the black stool since it might not be urology related. She will let the doctor know.

## 2023-12-07 NOTE — TELEPHONE ENCOUNTER
Please advise           Bipin, this is Maci Moses, birth date 1950. I'm a patient of Doctor Andreas Sumner and I actually had a virtual with Doctor Bessie Duarte last week because I had tested positive for COVID and I just wanted to let Doctor Andreas Sumner know anybody could call me back. I'm having some significant problems since then and last Monday I had a cystoscope and they did a I think it was a ultrasound or video or something of my date and they went through my rectum and since then now I'm having burning with my urination. So I called Doctor De Paz's office and they did order some antibiotic for me. And I had a urine analysis done this morning. But also feel with my rectum, I always feel as if they're still there. And it's also getting very gooey and dark and you don't see any blood in my stool, but just in general, I just don't feel well at all. So I was wondering if somebody could get back to me. My number is [phone]. Thank you. Take care.  Oseas.

## 2023-12-09 LAB — BACTERIA UR CULT: ABNORMAL

## 2023-12-11 ENCOUNTER — TELEPHONE (OUTPATIENT)
Age: 73
End: 2023-12-11

## 2023-12-11 ENCOUNTER — TELEPHONE (OUTPATIENT)
Dept: UROLOGY | Facility: CLINIC | Age: 73
End: 2023-12-11

## 2023-12-11 DIAGNOSIS — R39.9 UTI SYMPTOMS: Primary | ICD-10-CM

## 2023-12-11 DIAGNOSIS — N30.00 ACUTE CYSTITIS WITHOUT HEMATURIA: Primary | ICD-10-CM

## 2023-12-11 RX ORDER — AMOXICILLIN AND CLAVULANATE POTASSIUM 875; 125 MG/1; MG/1
1 TABLET, FILM COATED ORAL EVERY 12 HOURS SCHEDULED
Qty: 14 TABLET | Refills: 0 | Status: SHIPPED | OUTPATIENT
Start: 2023-12-11 | End: 2023-12-18

## 2023-12-11 RX ORDER — NITROFURANTOIN 25; 75 MG/1; MG/1
100 CAPSULE ORAL 2 TIMES DAILY
Qty: 14 CAPSULE | Refills: 0 | Status: SHIPPED | OUTPATIENT
Start: 2023-12-11 | End: 2023-12-18

## 2023-12-11 NOTE — TELEPHONE ENCOUNTER
Call placed to patient and spoke with him. Informed him of the of AP recommendations. Pt is aware and will take medication as directed.

## 2023-12-11 NOTE — TELEPHONE ENCOUNTER
Patient was given Keflex on Thursday for a urinary tract infection. He is still having painful urination. No other symptoms. Denies abdominal pain, hematuria , no fevers or chills. He wants to know if antibiotic needs to be changed. Please advise.

## 2023-12-11 NOTE — TELEPHONE ENCOUNTER
UC reviewed, came back positive. Macrobid sent to pharmacy. Take 2 tablets daily for 7 days. Stop taking Keflex.

## 2023-12-11 NOTE — TELEPHONE ENCOUNTER
Called and spoke with PT informing PT to stop taking Keflex and new antibiotics was sent to the pharmacy. PT stated he already picked up the antibiotics. No further questions.     ----- Message from Radha Massey PA-C sent at 12/11/2023  7:58 AM EST -----  No susceptibilities to Keflex. I sent new antibiotics. Discontinue Keflex.  Thanks

## 2023-12-19 NOTE — TELEPHONE ENCOUNTER
Pt just completed course of antibiotics on Sunday. Reports still having burning with urination reports its at a level 1. Pt unsure if he needs a couple more days of the medication or if he needs new urine testing. Pt reports all the other symptoms went away. Please advise

## 2023-12-20 ENCOUNTER — APPOINTMENT (OUTPATIENT)
Dept: LAB | Facility: MEDICAL CENTER | Age: 73
End: 2023-12-20
Payer: COMMERCIAL

## 2023-12-20 LAB

## 2023-12-20 PROCEDURE — 81001 URINALYSIS AUTO W/SCOPE: CPT

## 2023-12-20 NOTE — TELEPHONE ENCOUNTER
Spoke with patient and instructed him to go ahead and repeat the urine testing. Patient stated he will go today. There is currently an order for a Urinalysis microscopic in the system from 12/7/23, so he will have it done under that order.

## 2023-12-21 DIAGNOSIS — N18.2 STAGE 2 CHRONIC KIDNEY DISEASE: ICD-10-CM

## 2023-12-21 RX ORDER — LOSARTAN POTASSIUM 50 MG/1
50 TABLET ORAL DAILY
Qty: 90 TABLET | Refills: 1 | Status: SHIPPED | OUTPATIENT
Start: 2023-12-21

## 2023-12-21 RX ORDER — METOPROLOL SUCCINATE 25 MG/1
25 TABLET, EXTENDED RELEASE ORAL DAILY
Qty: 90 TABLET | Refills: 1 | Status: SHIPPED | OUTPATIENT
Start: 2023-12-21

## 2023-12-26 ENCOUNTER — NURSE TRIAGE (OUTPATIENT)
Age: 73
End: 2023-12-26

## 2023-12-26 ENCOUNTER — APPOINTMENT (OUTPATIENT)
Dept: LAB | Facility: MEDICAL CENTER | Age: 73
End: 2023-12-26
Payer: COMMERCIAL

## 2023-12-26 DIAGNOSIS — R39.9 UTI SYMPTOMS: ICD-10-CM

## 2023-12-26 DIAGNOSIS — N30.00 ACUTE CYSTITIS WITHOUT HEMATURIA: Primary | ICD-10-CM

## 2023-12-26 DIAGNOSIS — R39.9 UTI SYMPTOMS: Primary | ICD-10-CM

## 2023-12-26 LAB
BACTERIA UR QL AUTO: ABNORMAL /HPF
BILIRUB UR QL STRIP: NEGATIVE
CLARITY UR: CLEAR
COLOR UR: YELLOW
GLUCOSE UR STRIP-MCNC: NEGATIVE MG/DL
HGB UR QL STRIP.AUTO: NEGATIVE
KETONES UR STRIP-MCNC: NEGATIVE MG/DL
LEUKOCYTE ESTERASE UR QL STRIP: ABNORMAL
MUCOUS THREADS UR QL AUTO: ABNORMAL
NITRITE UR QL STRIP: NEGATIVE
NON-SQ EPI CELLS URNS QL MICRO: ABNORMAL /HPF
PH UR STRIP.AUTO: 6 [PH]
PROT UR STRIP-MCNC: ABNORMAL MG/DL
RBC #/AREA URNS AUTO: ABNORMAL /HPF
SP GR UR STRIP.AUTO: 1.02 (ref 1–1.03)
UROBILINOGEN UR STRIP-ACNC: <2 MG/DL
WBC #/AREA URNS AUTO: ABNORMAL /HPF

## 2023-12-26 PROCEDURE — 87086 URINE CULTURE/COLONY COUNT: CPT

## 2023-12-26 PROCEDURE — 81001 URINALYSIS AUTO W/SCOPE: CPT

## 2023-12-26 PROCEDURE — 87077 CULTURE AEROBIC IDENTIFY: CPT

## 2023-12-26 PROCEDURE — 87147 CULTURE TYPE IMMUNOLOGIC: CPT

## 2023-12-26 PROCEDURE — 87186 SC STD MICRODIL/AGAR DIL: CPT

## 2023-12-26 RX ORDER — NITROFURANTOIN 25; 75 MG/1; MG/1
100 CAPSULE ORAL 2 TIMES DAILY
Qty: 14 CAPSULE | Refills: 0 | Status: SHIPPED | OUTPATIENT
Start: 2023-12-26 | End: 2024-01-02

## 2023-12-26 NOTE — TELEPHONE ENCOUNTER
"Pt of Dr. Vora in [facility]. Last seen on 11/27/23 for BPH. Reports recent treatment of UTI    Pt reports having burning, frequency that started back up yesterday. Denies any fever, chills, nausea or vomiting. No blood in urine.  Reports he completed his last course of Augmentin but his symptoms started back up. Pt is traveling out of town today and asking if something can be sent to the pharmacy. He is willing to do urine sample today but asking if maybe the Augmentin he was prescribed before could be sent again. Did place orders for urine testing, pt will go this morning.     Answer Assessment - Initial Assessment Questions  1. SYMPTOM: \"What's the main symptom you're concerned about?\" (e.g., frequency, incontinence)      Burning  2. ONSET: \"When did the  symptoms  start?\"      yesterday  3. PAIN: \"Is there any pain?\" If Yes, ask: \"How bad is it?\" (Scale: 1-10; mild, moderate, severe)      mild  4. CAUSE: \"What do you think is causing the symptoms?\"      UTI  5. OTHER SYMPTOMS: \"Do you have any other symptoms?\" (e.g., fever, flank pain, blood in urine, pain with urination)      Burning    Protocols used: Urinary Symptoms-ADULT-[place]    "

## 2023-12-26 NOTE — TELEPHONE ENCOUNTER
Called patient and advised. Pt reports had urine testing done already. Will contact office with any other issues.

## 2023-12-28 LAB — BACTERIA UR CULT: ABNORMAL

## 2024-01-05 ENCOUNTER — TELEPHONE (OUTPATIENT)
Dept: INTERNAL MEDICINE CLINIC | Facility: CLINIC | Age: 74
End: 2024-01-05

## 2024-01-05 DIAGNOSIS — N18.2 STAGE 2 CHRONIC KIDNEY DISEASE: ICD-10-CM

## 2024-01-05 NOTE — TELEPHONE ENCOUNTER
Please advise         Bipin this is Bhanu Griffin calling patient of Doctor España. My YOB: 1950 and I'm calling in reference to a medication that was called in. That was called in last week before I went on vacation, my metoprolol. And today was the first day I had to use that prescription that I think you you ordered 25 milligrams instead of 50 As far as I know I was still on 50 milligrams of metoprolol Succinct and I just noticed that the pill was a different size. I did call the pharmacy to check to see whether there was an error on their part, but they said the prescription was 25 milligrams. So I just wanted to let you know much sure that Doctor Taco really wanted to do that. So anyway, if you could get back to me, my number is [phone]. Again, my name is Bhanu Griffin, Thursday 1950 and I just wanted to get this taken care of. OK, thank you. Take care,

## 2024-01-10 RX ORDER — METOPROLOL SUCCINATE 50 MG/1
50 TABLET, EXTENDED RELEASE ORAL DAILY
Qty: 90 TABLET | Refills: 1 | Status: SHIPPED | OUTPATIENT
Start: 2024-01-10

## 2024-01-22 ENCOUNTER — NURSE TRIAGE (OUTPATIENT)
Age: 74
End: 2024-01-22

## 2024-01-22 ENCOUNTER — APPOINTMENT (OUTPATIENT)
Dept: LAB | Facility: MEDICAL CENTER | Age: 74
End: 2024-01-22
Payer: COMMERCIAL

## 2024-01-22 DIAGNOSIS — R39.9 UTI SYMPTOMS: ICD-10-CM

## 2024-01-22 DIAGNOSIS — R39.9 UTI SYMPTOMS: Primary | ICD-10-CM

## 2024-01-22 LAB

## 2024-01-22 PROCEDURE — 81001 URINALYSIS AUTO W/SCOPE: CPT

## 2024-01-22 PROCEDURE — 87077 CULTURE AEROBIC IDENTIFY: CPT

## 2024-01-22 PROCEDURE — 87186 SC STD MICRODIL/AGAR DIL: CPT

## 2024-01-22 PROCEDURE — 87147 CULTURE TYPE IMMUNOLOGIC: CPT

## 2024-01-22 PROCEDURE — 87086 URINE CULTURE/COLONY COUNT: CPT

## 2024-01-22 RX ORDER — AMOXICILLIN AND CLAVULANATE POTASSIUM 875; 125 MG/1; MG/1
1 TABLET, FILM COATED ORAL EVERY 12 HOURS SCHEDULED
Qty: 14 TABLET | Refills: 0 | Status: SHIPPED | OUTPATIENT
Start: 2024-01-22 | End: 2024-01-29

## 2024-01-22 NOTE — TELEPHONE ENCOUNTER
"Pt of Dr. Sierra in [place]. Last seen on 11/27/23 for BPH with urinary obstruction     Burning with urination started last night and then blood in urine this morning. Denies fever, chills, nausea or vomiting. Denies any other symptoms. Orders placed for urine testing. ER precautions reviewed with patient. Pt asking if medication can be sent due to taking 3 days to get results and afraid of worsening of symptoms. Please advise  Reason for Disposition   All other urine symptoms    Answer Assessment - Initial Assessment Questions  1. SYMPTOM: \"What's the main symptom you're concerned about?\" (e.g., frequency, incontinence)      Burning with urination   2. ONSET: \"When did the  symptoms  start?\"      Last night  3. PAIN: \"Is there any pain?\" If Yes, ask: \"How bad is it?\" (Scale: 1-10; mild, moderate, severe)      mild  4. CAUSE: \"What do you think is causing the symptoms?\"      Another UTI  5. OTHER SYMPTOMS: \"Do you have any other symptoms?\" (e.g., fever, flank pain, blood in urine, pain with urination)      Some blood in urine this morning    Protocols used: Urinary Symptoms-ADULT-[place]    "

## 2024-01-22 NOTE — TELEPHONE ENCOUNTER
Spoke with patient and he was notified. He was very happy to hear something has been called in and he will go get the medicine and start it straight away. I told him I would continue to monitor for his results.

## 2024-01-22 NOTE — TELEPHONE ENCOUNTER
Antibiotics sent to pharmacy while we wait for final culture results. Please monitor for final results. May need to adjust antibiotics. Thanks

## 2024-01-24 LAB — BACTERIA UR CULT: ABNORMAL

## 2024-01-24 RX ORDER — NITROFURANTOIN 25; 75 MG/1; MG/1
100 CAPSULE ORAL 2 TIMES DAILY
Qty: 28 CAPSULE | Refills: 0 | Status: SHIPPED | OUTPATIENT
Start: 2024-01-24 | End: 2024-01-25

## 2024-01-24 NOTE — TELEPHONE ENCOUNTER
Please call patient to inform him that his urine culture came back positive again there is demonstration of the same species of bacteria so instead of recurrent urine tract infection I believe that this is persistent the species.  As such I will prescribe him with a 2-week course of Macrobid.  Unfortunately there were no other oral options for antibiotics.  If this does not cure this infection then we will need patient to potentially have ampicillin infusion at infusion center

## 2024-01-24 NOTE — TELEPHONE ENCOUNTER
Call placed to patient and spoke with him. Informed him of the AP recommendations and pt is aware. He will take as directed and contact the office in 2 weeks if medication does not work and symptoms persist.

## 2024-01-24 NOTE — TELEPHONE ENCOUNTER
Patient called today because he was informed that a prescription would be sent to pharmacy for   nitrofurantoin (MACROBID) 100 mg capsule.    Patient's pharmacy   [facility] - [address],  [address], [facility], [facility] [address]  Phone: [phone]  Fax: [phone]     States that they dont have anything in their system for the patient.    Please review.    Call back [phone]

## 2024-01-25 ENCOUNTER — TELEPHONE (OUTPATIENT)
Age: 74
End: 2024-01-25

## 2024-01-25 DIAGNOSIS — R39.9 UTI SYMPTOMS: ICD-10-CM

## 2024-01-25 RX ORDER — NITROFURANTOIN 25; 75 MG/1; MG/1
100 CAPSULE ORAL 2 TIMES DAILY
Qty: 28 CAPSULE | Refills: 0 | Status: SHIPPED | OUTPATIENT
Start: 2024-01-25 | End: 2024-02-08

## 2024-01-25 NOTE — TELEPHONE ENCOUNTER
Patient called requesting refill for Tamsulosin 0.4mg. Patient made aware medication was refilled on 11/27/2023 for 90 with 3 refills to [facility] pharmacy [place]. Patient instructed to contact the pharmacy to obtain refills of medication. Patient verbalized understanding.

## 2024-01-25 NOTE — TELEPHONE ENCOUNTER
Spoke with patient and he states as of yesterday evening the medication was not at the pharmacy. The pharmacy said someone can call it in.

## 2024-01-25 NOTE — TELEPHONE ENCOUNTER
Contacted Bhanu and made him aware Macrobid was re-sent to [facility] and receipt confirmed by the pharmacy.

## 2024-01-30 ENCOUNTER — TELEPHONE (OUTPATIENT)
Dept: UROLOGY | Facility: MEDICAL CENTER | Age: 74
End: 2024-01-30

## 2024-01-30 NOTE — TELEPHONE ENCOUNTER
"Called patient to schedule appointment; he notes does not need  to be seen as problem taken care of.       ---- Message from Nickie Krishnan RN sent at 1/30/2024  8:35 AM EST -----  Regarding: FW: Recurrent UTI  Contact: [phone]    ----- Message -----  From: Deja Rahman PA-C  Sent: 1/29/2024   6:14 PM EST  To: [facility]  Subject: FW: Recurrent UTI                                Please have patient come in to office to discuss recurrent UTI since cystoscopy. MD or PA. Thanks   ----- Message -----  From: Sanna Sagastume MA  Sent: 1/23/2024  11:43 AM EST  To: [facility] Provider  Subject: FW: Recurrent UTI                                  ----- Message -----  From: Fredi Griffin \"Bhanu\"  Sent: 1/22/2024   4:51 PM EST  To: [facility]  Subject: Recurrent UTI                                    Hello,    I just wanted to follow up with a concern. I now have my third UTI and my fourth different antibiotic since I had my cystoscopy in early December. I have never had a UTI prior to now. Can you provide some guidance as to what may have started this pattern and what we can do ensure we get full resolution this time? Thank you.    Bhanu Griffin        "

## 2024-02-20 ENCOUNTER — TELEPHONE (OUTPATIENT)
Age: 74
End: 2024-02-20

## 2024-02-20 NOTE — TELEPHONE ENCOUNTER
Reason for call:   [x] Refill   [] Prior Auth  [] Other:     Office:   [x] PCP/Provider - Hitesh brooks  [] Specialty/Provider -     Medication: Chlorthalidone 25 mg    Dose/Frequency: 1 Tab daily    Quantity: 90    Pharmacy: [facility]    Does the patient have enough for 3 days?   [x] Yes   [] No - Send as HP to POD

## 2024-02-21 PROBLEM — R05.9 COUGH: Status: RESOLVED | Noted: 2020-01-16 | Resolved: 2024-02-21

## 2024-02-24 DIAGNOSIS — N18.2 STAGE 2 CHRONIC KIDNEY DISEASE: ICD-10-CM

## 2024-02-24 DIAGNOSIS — N18.2 STAGE 2 CHRONIC KIDNEY DISEASE: Primary | ICD-10-CM

## 2024-02-24 RX ORDER — CHLORTHALIDONE 25 MG/1
25 TABLET ORAL DAILY
Qty: 90 TABLET | Refills: 1 | Status: SHIPPED | OUTPATIENT
Start: 2024-02-24 | End: 2024-02-26

## 2024-02-26 RX ORDER — CHLORTHALIDONE 25 MG/1
25 TABLET ORAL DAILY
Qty: 90 TABLET | Refills: 0 | Status: SHIPPED | OUTPATIENT
Start: 2024-02-26

## 2024-03-28 ENCOUNTER — TELEPHONE (OUTPATIENT)
Dept: NEPHROLOGY | Facility: CLINIC | Age: 74
End: 2024-03-28

## 2024-03-31 DIAGNOSIS — I10 PRIMARY HYPERTENSION: ICD-10-CM

## 2024-03-31 RX ORDER — AMLODIPINE BESYLATE 5 MG/1
5 TABLET ORAL DAILY
Qty: 90 TABLET | Refills: 1 | Status: SHIPPED | OUTPATIENT
Start: 2024-03-31

## 2024-04-04 NOTE — TELEPHONE ENCOUNTER
Sent [email or web address] message to call office to schedule September follow up with Dr. Hilton. (Recall list)

## 2024-05-21 DIAGNOSIS — N18.2 STAGE 2 CHRONIC KIDNEY DISEASE: ICD-10-CM

## 2024-05-21 RX ORDER — CHLORTHALIDONE 25 MG/1
25 TABLET ORAL DAILY
Qty: 90 TABLET | Refills: 1 | Status: SHIPPED | OUTPATIENT
Start: 2024-05-21

## 2024-06-29 DIAGNOSIS — N18.2 STAGE 2 CHRONIC KIDNEY DISEASE: ICD-10-CM

## 2024-06-29 RX ORDER — LOSARTAN POTASSIUM 50 MG/1
50 TABLET ORAL DAILY
Qty: 90 TABLET | Refills: 1 | Status: SHIPPED | OUTPATIENT
Start: 2024-06-29

## 2024-08-07 ENCOUNTER — APPOINTMENT (OUTPATIENT)
Dept: LAB | Facility: MEDICAL CENTER | Age: 74
End: 2024-08-07
Payer: COMMERCIAL

## 2024-08-07 DIAGNOSIS — I12.9 BENIGN HYPERTENSION WITH CHRONIC KIDNEY DISEASE, STAGE II: ICD-10-CM

## 2024-08-07 DIAGNOSIS — R94.4 DECREASED CALCULATED GFR: ICD-10-CM

## 2024-08-07 DIAGNOSIS — N18.2 STAGE 2 CHRONIC KIDNEY DISEASE: ICD-10-CM

## 2024-08-07 DIAGNOSIS — Z00.8 HEALTH EXAMINATION IN POPULATION SURVEY: ICD-10-CM

## 2024-08-07 DIAGNOSIS — N18.2 BENIGN HYPERTENSION WITH CHRONIC KIDNEY DISEASE, STAGE II: ICD-10-CM

## 2024-08-07 LAB
ALBUMIN SERPL BCG-MCNC: 4 G/DL (ref 3.5–5)
ANION GAP SERPL CALCULATED.3IONS-SCNC: 10 MMOL/L (ref 4–13)
BUN SERPL-MCNC: 19 MG/DL (ref 5–25)
CALCIUM SERPL-MCNC: 9.2 MG/DL (ref 8.4–10.2)
CHLORIDE SERPL-SCNC: 99 MMOL/L (ref 96–108)
CHOLEST SERPL-MCNC: 159 MG/DL
CO2 SERPL-SCNC: 29 MMOL/L (ref 21–32)
CREAT SERPL-MCNC: 1.04 MG/DL (ref 0.6–1.3)
EST. AVERAGE GLUCOSE BLD GHB EST-MCNC: 154 MG/DL
GFR SERPL CREATININE-BSD FRML MDRD: 71 ML/MIN/1.73SQ M
GLUCOSE P FAST SERPL-MCNC: 124 MG/DL (ref 65–99)
HBA1C MFR BLD: 7 %
HDLC SERPL-MCNC: 29 MG/DL
LDLC SERPL DIRECT ASSAY-MCNC: 55 MG/DL (ref 0–100)
PHOSPHATE SERPL-MCNC: 2.8 MG/DL (ref 2.3–4.1)
POTASSIUM SERPL-SCNC: 3.8 MMOL/L (ref 3.5–5.3)
SODIUM SERPL-SCNC: 138 MMOL/L (ref 135–147)
TRIGL SERPL-MCNC: 444 MG/DL

## 2024-08-07 PROCEDURE — 83036 HEMOGLOBIN GLYCOSYLATED A1C: CPT

## 2024-08-07 PROCEDURE — 80069 RENAL FUNCTION PANEL: CPT

## 2024-08-07 PROCEDURE — 80061 LIPID PANEL: CPT

## 2024-08-07 PROCEDURE — 83721 ASSAY OF BLOOD LIPOPROTEIN: CPT

## 2024-08-09 DIAGNOSIS — E11.9 TYPE 2 DIABETES MELLITUS WITHOUT COMPLICATION, WITHOUT LONG-TERM CURRENT USE OF INSULIN (HCC): Primary | ICD-10-CM

## 2024-09-23 ENCOUNTER — TELEPHONE (OUTPATIENT)
Age: 74
End: 2024-09-23

## 2024-09-23 ENCOUNTER — APPOINTMENT (OUTPATIENT)
Dept: LAB | Facility: HOSPITAL | Age: 74
End: 2024-09-23
Payer: COMMERCIAL

## 2024-09-23 DIAGNOSIS — N18.2 STAGE 2 CHRONIC KIDNEY DISEASE: ICD-10-CM

## 2024-09-23 DIAGNOSIS — I10 PRIMARY HYPERTENSION: ICD-10-CM

## 2024-09-23 DIAGNOSIS — N18.2 STAGE 2 CHRONIC KIDNEY DISEASE: Primary | ICD-10-CM

## 2024-09-23 LAB
ALBUMIN SERPL BCG-MCNC: 4.1 G/DL (ref 3.5–5)
ANION GAP SERPL CALCULATED.3IONS-SCNC: 8 MMOL/L (ref 4–13)
BASOPHILS # BLD AUTO: 0.04 THOUSANDS/ΜL (ref 0–0.1)
BASOPHILS NFR BLD AUTO: 1 % (ref 0–1)
BUN SERPL-MCNC: 19 MG/DL (ref 5–25)
CALCIUM SERPL-MCNC: 9 MG/DL (ref 8.4–10.2)
CHLORIDE SERPL-SCNC: 100 MMOL/L (ref 96–108)
CO2 SERPL-SCNC: 27 MMOL/L (ref 21–32)
CREAT SERPL-MCNC: 1.02 MG/DL (ref 0.6–1.3)
CREAT UR-MCNC: 106 MG/DL
CREAT UR-MCNC: 106.7 MG/DL
EOSINOPHIL # BLD AUTO: 0.14 THOUSAND/ΜL (ref 0–0.61)
EOSINOPHIL NFR BLD AUTO: 2 % (ref 0–6)
ERYTHROCYTE [DISTWIDTH] IN BLOOD BY AUTOMATED COUNT: 12.1 % (ref 11.6–15.1)
GFR SERPL CREATININE-BSD FRML MDRD: 73 ML/MIN/1.73SQ M
GLUCOSE SERPL-MCNC: 164 MG/DL (ref 65–140)
HCT VFR BLD AUTO: 40.2 % (ref 36.5–49.3)
HGB BLD-MCNC: 13.7 G/DL (ref 12–17)
IMM GRANULOCYTES # BLD AUTO: 0.02 THOUSAND/UL (ref 0–0.2)
IMM GRANULOCYTES NFR BLD AUTO: 0 % (ref 0–2)
LYMPHOCYTES # BLD AUTO: 2.12 THOUSANDS/ΜL (ref 0.6–4.47)
LYMPHOCYTES NFR BLD AUTO: 27 % (ref 14–44)
MCH RBC QN AUTO: 31.3 PG (ref 26.8–34.3)
MCHC RBC AUTO-ENTMCNC: 34.1 G/DL (ref 31.4–37.4)
MCV RBC AUTO: 92 FL (ref 82–98)
MICROALBUMIN UR-MCNC: <7 MG/L
MONOCYTES # BLD AUTO: 0.75 THOUSAND/ΜL (ref 0.17–1.22)
MONOCYTES NFR BLD AUTO: 10 % (ref 4–12)
NEUTROPHILS # BLD AUTO: 4.86 THOUSANDS/ΜL (ref 1.85–7.62)
NEUTS SEG NFR BLD AUTO: 60 % (ref 43–75)
NRBC BLD AUTO-RTO: 0 /100 WBCS
PHOSPHATE SERPL-MCNC: 2.8 MG/DL (ref 2.3–4.1)
PLATELET # BLD AUTO: 236 THOUSANDS/UL (ref 149–390)
PMV BLD AUTO: 10.6 FL (ref 8.9–12.7)
POTASSIUM SERPL-SCNC: 4.1 MMOL/L (ref 3.5–5.3)
PROT UR-MCNC: 6 MG/DL
PROT/CREAT UR: 0.1 MG/G{CREAT} (ref 0–0.1)
RBC # BLD AUTO: 4.38 MILLION/UL (ref 3.88–5.62)
SODIUM SERPL-SCNC: 135 MMOL/L (ref 135–147)
WBC # BLD AUTO: 7.93 THOUSAND/UL (ref 4.31–10.16)

## 2024-09-23 PROCEDURE — 84156 ASSAY OF PROTEIN URINE: CPT

## 2024-09-23 PROCEDURE — 80069 RENAL FUNCTION PANEL: CPT

## 2024-09-23 PROCEDURE — 82043 UR ALBUMIN QUANTITATIVE: CPT

## 2024-09-23 PROCEDURE — 85025 COMPLETE CBC W/AUTO DIFF WBC: CPT

## 2024-09-23 PROCEDURE — 82570 ASSAY OF URINE CREATININE: CPT

## 2024-09-23 NOTE — TELEPHONE ENCOUNTER
Patient needs a new order for  his labs they have   and he will be going to the lab  this afternoon to have them done.

## 2024-09-24 ENCOUNTER — OFFICE VISIT (OUTPATIENT)
Dept: NEPHROLOGY | Facility: CLINIC | Age: 74
End: 2024-09-24
Payer: COMMERCIAL

## 2024-09-24 VITALS
WEIGHT: 254 LBS | OXYGEN SATURATION: 96 % | HEIGHT: 68 IN | BODY MASS INDEX: 38.49 KG/M2 | DIASTOLIC BLOOD PRESSURE: 74 MMHG | SYSTOLIC BLOOD PRESSURE: 160 MMHG | HEART RATE: 64 BPM

## 2024-09-24 DIAGNOSIS — E66.01 CLASS 2 SEVERE OBESITY DUE TO EXCESS CALORIES WITH SERIOUS COMORBIDITY AND BODY MASS INDEX (BMI) OF 38.0 TO 38.9 IN ADULT (HCC): ICD-10-CM

## 2024-09-24 DIAGNOSIS — R94.4 DECREASED CALCULATED GFR: ICD-10-CM

## 2024-09-24 DIAGNOSIS — E66.812 CLASS 2 SEVERE OBESITY DUE TO EXCESS CALORIES WITH SERIOUS COMORBIDITY AND BODY MASS INDEX (BMI) OF 38.0 TO 38.9 IN ADULT (HCC): ICD-10-CM

## 2024-09-24 DIAGNOSIS — N28.1 RENAL CYST: ICD-10-CM

## 2024-09-24 DIAGNOSIS — I1A.0 RESISTANT HYPERTENSION: Primary | ICD-10-CM

## 2024-09-24 PROCEDURE — 99214 OFFICE O/P EST MOD 30 MIN: CPT | Performed by: INTERNAL MEDICINE

## 2024-09-24 RX ORDER — LOSARTAN POTASSIUM 50 MG/1
50 TABLET ORAL 2 TIMES DAILY
Qty: 180 TABLET | Refills: 3 | Status: SHIPPED | OUTPATIENT
Start: 2024-09-24 | End: 2024-12-23

## 2024-09-24 NOTE — PROGRESS NOTES
NEPHROLOGY OFFICE VISIT   Fredi Griffin 73 y.o. male MRN: [id number]  9/24/2024    Reason for Visit: Hypertension and chronic kidney disease    ASSESSMENT and PLAN:  It was a pleasure evaluating your patient in the office today. Thank you for allowing our team to participate in the care of Mr. Fredi Griffin. Please do not hesitate to contact our team if further issues/questions shall arise in the interim.     # Decreased calculated GFR  - Etiology/risk factors: Hypertension, prediabetes, obesity related hyperfiltration  - Baseline serum creatinine appears to be around 1.0-1.2, most recently 1.02 09/2024  - Cystatin C 1.26, EGFR 55 11/2022  - Serum creatinine probably overestimates kidney function   - Urinalysis: [facility]  - Proteinuria: UACR/UPCR: No proteinuria as of 09/2024  - Imaging: Right renal cyst, no hydronephrosis  - Renal function is currently stable  - Discussed risk factor reduction to retard progression of chronic kidney disease  - Discussed intensive blood pressure control as below   - Discussed weight loss with exercise and dietary modifications  - Discussed avoidance of NSAIDs     # Right renal cyst  - Anechoic midpole cyst measuring 3.4/3.2/3.1 cm  - Repeat kidney ultrasound pending in 11/2024 to monitor the size of kidney cyst     # Hypertension/Volume  # Resistant hypertension  - Target Goal: Less than 120/80 per KDIGO guidelines   - Bilateral lower extremity swelling present most likely chronic venous insufficiency, no evidence of volume overload overall  - Status: Blood pressure currently well controlled and at goal  - Current antihypertensive regimen: Losartan 50 mg daily, Toprol-XL 25 mg daily, chlorthalidone 25 mg daily and amlodipine 5 mg daily  - Changes: Increase losartan to 50 mg twice daily  - Repeat BMP in 1 month after increasing the dose of losartan  - Check aldosterone/renin ratio  - Check metanephrine/normetanephrine  - Check renal artery Doppler to rule out renal artery stenosis  - We  discussed importance of weight loss and management of hypertension    # Screening for anemia   - Target Hb: More than 10 g/dL  - Most recent hemoglobin 13.7 g/dL 09/2024     # Electrolytes/Acid Base status   - No electrolyte or acid-base disturbance     # BPH  - Cystoscopy 11/2023: Prostatic enlargement with outflow obstruction and bladder diverticulum  - Current Rx: Finasteride 5 mg daily, tamsulosin 0.4 mg daily  - Ongoing management per urology    # Class II Obesity with uncontrolled hypertension  - Discussed importance of weight loss in management of hypertension and prevention of chronic kidney disease and prevention of diabetes  - Patient is motivated to lose weight and verbalized understanding    # Follow-up  - BMP in 1 month, full panel of lab testing with renal function panel/UACR/UPCR in 6 months  - Office follow-up in 6 months    HPI:  Since last visit: He has gained some weight.  He denies dyspnea.  He denies leg swelling.  He denies any trouble with urination.    73-year-old male with history of hypertension for 40 years, prediabetes and BPH.  He was seen in the hospital in August for acute kidney injury.  He was admitted with hypotension and elevated creatinine.  His admission creatinine was 2.24 that improved with IV fluid hydration and adjustment of antihypertensive regimen.     >> Major risk factors for CKD  - Diabetes: Pre diabetes, not on medications   - Hypertension: Yes for 40 years   - Age ? 55 years: Yes   - Family history of kidney disease: No   - Obesity or metabolic syndrome: Yes      >> Medical history evaluation   - Prior kidney disease or dialysis: JOSE LUIS in 08/2022  - Incidental albuminuria or hematuria in the past: No   - Urinary symptoms: Nocturia 3-4 times, stream is weak   - History of foamy or frothy urine: No   - History of nephrolithiasis: No   - Diseases that share risk factors with CKD: DM, HTN  - Systemic diseases that might affect kidney: No   - History of use of medications  that might affect renal function: No     PATIENT INSTRUCTIONS:    There are no Patient Instructions on file for this visit.  OBJECTIVE:  Current Weight:    There were no vitals filed for this visit. There is no height or weight on file to calculate BMI.      REVIEW OF SYSTEMS:    Review of Systems   Constitutional:  Negative for chills and fever.   HENT:  Negative for ear pain and sore throat.    Eyes:  Negative for pain and visual disturbance.   Respiratory:  Negative for cough and shortness of breath.    Cardiovascular:  Negative for chest pain and palpitations.   Gastrointestinal:  Negative for abdominal pain and vomiting.   Genitourinary:  Negative for dysuria and hematuria.   Musculoskeletal:  Negative for arthralgias and back pain.   Skin:  Negative for color change and rash.   Neurological:  Negative for seizures and syncope.   All other systems reviewed and are negative.      Past Medical History:   Diagnosis Date    Hearing loss of aging     History of total left knee replacement     HL (hearing loss)     Hx of exposure to tuberculosis     treated in past with meds    Hyperlipidemia     Hypertension     OA (osteoarthritis)     bea  knees    Ocular hypertension of left eye     Tinnitus     Use of cane as ambulatory aid     Wears glasses        Past Surgical History:   Procedure Laterality Date    ADENOIDECTOMY      CATARACT EXTRACTION Bilateral     COLONOSCOPY      JOINT REPLACEMENT Left     knee    FL ARTHRP KNE CONDYLE&PLATU MEDIAL&LAT COMPARTMENTS Left 7/14/2017    Procedure: ARTHROPLASTY KNEE TOTAL;  Surgeon: Roque Jean MD;  [facility];  Service: Orthopedics    FL ARTHRP KNE CONDYLE&PLATU MEDIAL&LAT COMPARTMENTS Right 9/5/2017    Procedure: ARTHROPLASTY KNEE TOTAL;  Surgeon: Roque Jean MD;  Location[facility];  Service: Orthopedics    TONSILLECTOMY      WISDOM TOOTH EXTRACTION         Family History   Problem Relation Age of Onset    Diabetes Mother     COPD Father     Cancer Father          Gastric    Cancer Paternal Uncle         Gastric    Breast cancer Family     Thyroid cancer Family         Social History     Substance and Sexual Activity   Alcohol Use No     Social History     Substance and Sexual Activity   Drug Use No     Social History     Tobacco Use   Smoking Status Former    Current packs/day: 0.00    Types: Cigarettes    Quit date: 1972    Years since quittin.2   Smokeless Tobacco Never   Tobacco Comments    per [employer]:  Never a smoker       PHYSICAL EXAM:      Physical Exam  Constitutional:       Appearance: Normal appearance.   HENT:      Head: Normocephalic and atraumatic.   Cardiovascular:      Rate and Rhythm: Normal rate and regular rhythm.      Pulses: Normal pulses.      Heart sounds: Normal heart sounds.   Pulmonary:      Effort: Pulmonary effort is normal.      Breath sounds: Normal breath sounds.   Abdominal:      Tenderness: There is no right CVA tenderness or left CVA tenderness.   Musculoskeletal:         General: Normal range of motion.      Right lower leg: No edema.      Left lower leg: No edema.   Skin:     General: Skin is warm.   Neurological:      Mental Status: He is alert and oriented to person, place, and time. Mental status is at baseline.   Psychiatric:         Mood and Affect: Mood normal.       Medications:    Current Outpatient Medications:     amLODIPine (NORVASC) 5 mg tablet, Take 1 tablet (5 mg total) by mouth daily, Disp: 90 tablet, Rfl: 1    Ascorbic Acid (vitamin C) 1000 MG tablet, Take 1,000 mg by mouth daily, Disp: , Rfl:     chlorthalidone 25 mg tablet, Take 1 tablet (25 mg total) by mouth daily, Disp: 90 tablet, Rfl: 1    dorzolamide-timolol (COSOPT) 22.3-6.8 MG/ML ophthalmic solution, 1 drop 2 (two) times a day, Disp: , Rfl:     finasteride (PROSCAR) 5 mg tablet, Take 1 tablet (5 mg total) by mouth daily, Disp: 90 tablet, Rfl: 3    hydrocortisone 1 % cream, Apply topically 4 (four) times a day as needed for irritation, Disp: 114 g,  Rfl: 0    losartan (COZAAR) 50 mg tablet, Take 1 tablet (50 mg total) by mouth daily, Disp: 90 tablet, Rfl: 1    metoprolol succinate (TOPROL-XL) 50 mg 24 hr tablet, Take 1 tablet (50 mg total) by mouth daily, Disp: 90 tablet, Rfl: 1    Multiple Vitamin (MULTI-VITAMIN DAILY PO), Take 1 tablet by mouth daily, Disp: , Rfl:     tamsulosin (FLOMAX) 0.4 mg, Take 1 capsule (0.4 mg total) by mouth daily at bedtime, Disp: 90 capsule, Rfl: 3    Laboratory Results:  Results from last 7 days   Lab Units 09/23/24  1035   WBC Thousand/uL 7.93   HEMOGLOBIN g/dL 13.7   HEMATOCRIT % 40.2   PLATELETS Thousands/uL 236   POTASSIUM mmol/L 4.1   CHLORIDE mmol/L 100   CO2 mmol/L 27   BUN mg/dL 19   CREATININE mg/dL 1.02   CALCIUM mg/dL 9.0   PHOSPHORUS mg/dL 2.8       Results for orders placed or performed in visit on 08/07/24   Renal function panel   Result Value Ref Range    Albumin 4.0 3.5 - 5.0 g/dL    Calcium 9.2 8.4 - 10.2 mg/dL    Phosphorus 2.8 2.3 - 4.1 mg/dL    BUN 19 5 - 25 mg/dL    Creatinine 1.04 0.60 - 1.30 mg/dL    Sodium 138 135 - 147 mmol/L    Potassium 3.8 3.5 - 5.3 mmol/L    Chloride 99 96 - 108 mmol/L    CO2 29 21 - 32 mmol/L    ANION GAP 10 4 - 13 mmol/L    eGFR 71 ml/min/1.73sq m    Glucose, Fasting 124 (H) 65 - 99 mg/dL   Renal function panel   Result Value Ref Range    Albumin 4.1 3.5 - 5.0 g/dL    Calcium 9.0 8.4 - 10.2 mg/dL    Phosphorus 2.8 2.3 - 4.1 mg/dL    Glucose 164 (H) 65 - 140 mg/dL    BUN 19 5 - 25 mg/dL    Creatinine 1.02 0.60 - 1.30 mg/dL    Sodium 135 135 - 147 mmol/L    Potassium 4.1 3.5 - 5.3 mmol/L    Chloride 100 96 - 108 mmol/L    CO2 27 21 - 32 mmol/L    ANION GAP 8 4 - 13 mmol/L    eGFR 73 ml/min/1.73sq m   Protein / creatinine ratio, urine   Result Value Ref Range    Creatinine, Ur 106.7 Reference range not established. mg/dL    Protein Urine Random 6.0 Reference range not established. mg/dL    Prot/Creat Ratio, Ur 0.1 0.0 - 0.1   Albumin / creatinine urine ratio   Result Value Ref Range     Creatinine, Ur 106.0 Reference range not established. mg/dL    Albumin,U,Random <7.0 <20.0 mg/L    Albumin Creat Ratio     CBC and differential   Result Value Ref Range    WBC 7.93 4.31 - 10.16 Thousand/uL    RBC 4.38 3.88 - 5.62 Million/uL    Hemoglobin 13.7 12.0 - 17.0 g/dL    Hematocrit 40.2 36.5 - 49.3 %    MCV 92 82 - 98 fL    MCH 31.3 26.8 - 34.3 pg    MCHC 34.1 31.4 - 37.4 g/dL    RDW 12.1 11.6 - 15.1 %    MPV 10.6 8.9 - 12.7 fL    Platelets 236 149 - 390 Thousands/uL    nRBC 0 /100 WBCs    Segmented % 60 43 - 75 %    Immature Grans % 0 0 - 2 %    Lymphocytes % 27 14 - 44 %    Monocytes % 10 4 - 12 %    Eosinophils Relative 2 0 - 6 %    Basophils Relative 1 0 - 1 %    Absolute Neutrophils 4.86 1.85 - 7.62 Thousands/µL    Absolute Immature Grans 0.02 0.00 - 0.20 Thousand/uL    Absolute Lymphocytes 2.12 0.60 - 4.47 Thousands/µL    Absolute Monocytes 0.75 0.17 - 1.22 Thousand/µL    Eosinophils Absolute 0.14 0.00 - 0.61 Thousand/µL    Basophils Absolute 0.04 0.00 - 0.10 Thousands/µL

## 2024-09-27 DIAGNOSIS — N18.2 STAGE 2 CHRONIC KIDNEY DISEASE: ICD-10-CM

## 2024-09-27 DIAGNOSIS — I10 PRIMARY HYPERTENSION: ICD-10-CM

## 2024-09-27 RX ORDER — METOPROLOL SUCCINATE 50 MG/1
50 TABLET, EXTENDED RELEASE ORAL DAILY
Qty: 30 TABLET | Refills: 0 | Status: SHIPPED | OUTPATIENT
Start: 2024-09-27

## 2024-09-28 RX ORDER — AMLODIPINE BESYLATE 5 MG/1
5 TABLET ORAL DAILY
Qty: 30 TABLET | Refills: 0 | Status: SHIPPED | OUTPATIENT
Start: 2024-09-28

## 2024-10-25 DIAGNOSIS — N18.2 STAGE 2 CHRONIC KIDNEY DISEASE: ICD-10-CM

## 2024-10-25 DIAGNOSIS — N40.1 BENIGN PROSTATIC HYPERPLASIA WITH URINARY OBSTRUCTION: ICD-10-CM

## 2024-10-25 DIAGNOSIS — N13.8 BENIGN PROSTATIC HYPERPLASIA WITH URINARY OBSTRUCTION: ICD-10-CM

## 2024-10-25 RX ORDER — FINASTERIDE 5 MG/1
5 TABLET, FILM COATED ORAL DAILY
Qty: 90 TABLET | Refills: 0 | Status: SHIPPED | OUTPATIENT
Start: 2024-10-25

## 2024-10-25 RX ORDER — METOPROLOL SUCCINATE 50 MG/1
50 TABLET, EXTENDED RELEASE ORAL DAILY
Qty: 90 TABLET | Refills: 1 | Status: SHIPPED | OUTPATIENT
Start: 2024-10-25

## 2024-11-18 DIAGNOSIS — N18.2 STAGE 2 CHRONIC KIDNEY DISEASE: ICD-10-CM

## 2024-11-19 ENCOUNTER — OFFICE VISIT (OUTPATIENT)
Dept: URGENT CARE | Facility: MEDICAL CENTER | Age: 74
End: 2024-11-19
Payer: COMMERCIAL

## 2024-11-19 VITALS
RESPIRATION RATE: 20 BRPM | HEIGHT: 68 IN | SYSTOLIC BLOOD PRESSURE: 136 MMHG | OXYGEN SATURATION: 97 % | TEMPERATURE: 97.7 F | WEIGHT: 254 LBS | BODY MASS INDEX: 38.49 KG/M2 | HEART RATE: 65 BPM | DIASTOLIC BLOOD PRESSURE: 70 MMHG

## 2024-11-19 DIAGNOSIS — L08.9 SKIN INFECTION: Primary | ICD-10-CM

## 2024-11-19 PROCEDURE — 99213 OFFICE O/P EST LOW 20 MIN: CPT

## 2024-11-19 RX ORDER — CHLORTHALIDONE 25 MG/1
25 TABLET ORAL DAILY
Qty: 90 TABLET | Refills: 1 | Status: SHIPPED | OUTPATIENT
Start: 2024-11-19

## 2024-11-19 RX ORDER — CEPHALEXIN 500 MG/1
500 CAPSULE ORAL EVERY 6 HOURS SCHEDULED
Qty: 20 CAPSULE | Refills: 0 | Status: SHIPPED | OUTPATIENT
Start: 2024-11-19 | End: 2024-11-22 | Stop reason: SDUPTHER

## 2024-11-19 RX ORDER — MUPIROCIN 20 MG/G
OINTMENT TOPICAL 3 TIMES DAILY
Qty: 1 G | Refills: 0 | Status: SHIPPED | OUTPATIENT
Start: 2024-11-19 | End: 2024-11-24

## 2024-11-19 NOTE — PROGRESS NOTES
[facility]        NAME: Fredi Griffin is a 74 y.o. male  : 1950    MRN: [id number]  DATE: 2024  TIME: 11:23 AM    Assessment and Plan   Skin infection [L08.9]  1. Skin infection  mupirocin (BACTROBAN) 2 % ointment    cephalexin (KEFLEX) 500 mg capsule            Patient Instructions   Take the antibiotic as prescribed  Use the Bactroban on your chin  Do not squeeze at the area  Warm compresses to your chin  Follow-up with your PCP    Follow up with PCP in 3-5 days.  Proceed to  ER if symptoms worsen.    If tests have been performed at [facility], our office will contact you with results if changes need to be made to the care plan discussed with you at the visit.  [facility]    Chief Complaint     Chief Complaint   Patient presents with    Cellulitis     Patient presents with reddened chin area started  or Monday he states it started out like a little pimple but has been gotten worse and more swollen.         History of Present Illness       This is a 74-year-old male who presents today with a possible pimple on his chin that he noticed just  or Monday.  He states that its gotten progressively worse area on his chin is erythematous and painful.  Patient denies any fever or chills.  Patient states that he was squeezing on his chin this morning.  He also states that he has a sore spot on the inside of his mouth.  He biting his cheek        Review of Systems   Review of Systems   Constitutional: Negative.  Negative for chills, diaphoresis, fatigue and fever.   HENT:  Positive for facial swelling and mouth sores.    Respiratory: Negative.  Negative for cough and shortness of breath.    Cardiovascular: Negative.    Gastrointestinal: Negative.    Genitourinary: Negative.    Skin:  Positive for rash and wound.   Neurological: Negative.          Current Medications       Current Outpatient Medications:     cephalexin (KEFLEX) 500 mg  capsule, Take 1 capsule (500 mg total) by mouth every 6 (six) hours for 5 days, Disp: 20 capsule, Rfl: 0    mupirocin (BACTROBAN) 2 % ointment, Apply topically 3 (three) times a day for 5 days, Disp: 1 g, Rfl: 0    amLODIPine (NORVASC) 5 mg tablet, Take 1 tablet (5 mg total) by mouth daily, Disp: 30 tablet, Rfl: 0    Ascorbic Acid (vitamin C) 1000 MG tablet, Take 1,000 mg by mouth daily, Disp: , Rfl:     chlorthalidone 25 mg tablet, Take 1 tablet (25 mg total) by mouth daily, Disp: 90 tablet, Rfl: 1    dorzolamide-timolol (COSOPT) 22.3-6.8 MG/ML ophthalmic solution, 1 drop 2 (two) times a day, Disp: , Rfl:     finasteride (PROSCAR) 5 mg tablet, Take 1 tablet (5 mg total) by mouth daily, Disp: 90 tablet, Rfl: 0    hydrocortisone 1 % cream, Apply topically 4 (four) times a day as needed for irritation (Patient not taking: Reported on 9/24/2024), Disp: 114 g, Rfl: 0    losartan (COZAAR) 50 mg tablet, Take 1 tablet (50 mg total) by mouth 2 (two) times a day, Disp: 180 tablet, Rfl: 3    metoprolol succinate (TOPROL-XL) 50 mg 24 hr tablet, Take 1 tablet (50 mg total) by mouth daily, Disp: 90 tablet, Rfl: 1    Multiple Vitamin (MULTI-VITAMIN DAILY PO), Take 1 tablet by mouth daily, Disp: , Rfl:     tamsulosin (FLOMAX) 0.4 mg, Take 1 capsule (0.4 mg total) by mouth daily at bedtime, Disp: 90 capsule, Rfl: 3    Current Allergies     Allergies as of 11/19/2024 - Reviewed 11/19/2024   Allergen Reaction Noted    Celebrex [celecoxib] GI Intolerance 07/14/2017    Influenza vaccines  07/14/2017            The following portions of the patient's history were reviewed and updated as appropriate: allergies, current medications, past family history, past medical history, past social history, past surgical history and problem list.     Past Medical History:   Diagnosis Date    Hearing loss of aging     History of total left knee replacement     HL (hearing loss)     Hx of exposure to tuberculosis     treated in past with meds     "Hyperlipidemia     Hypertension     OA (osteoarthritis)     bea  knees    Ocular hypertension of left eye     Tinnitus     Use of cane as ambulatory aid     Wears glasses        Past Surgical History:   Procedure Laterality Date    ADENOIDECTOMY      CATARACT EXTRACTION Bilateral     COLONOSCOPY      JOINT REPLACEMENT Left     knee    IN ARTHRP KNE CONDYLE&PLATU MEDIAL&LAT COMPARTMENTS Left 7/14/2017    Procedure: ARTHROPLASTY KNEE TOTAL;  Surgeon: Roque Jean MD;  [facility];  Service: Orthopedics    IN ARTHRP KNE CONDYLE&PLATU MEDIAL&LAT COMPARTMENTS Right 9/5/2017    Procedure: ARTHROPLASTY KNEE TOTAL;  Surgeon: Roque Jean MD;  [facility];  Service: Orthopedics    TONSILLECTOMY      WISDOM TOOTH EXTRACTION         Family History   Problem Relation Age of Onset    Diabetes Mother     COPD Father     Cancer Father         Gastric    Cancer Paternal Uncle         Gastric    Breast cancer Family     Thyroid cancer Family          Medications have been verified.        Objective   /70   Pulse 65   Temp 97.7 °F (36.5 °C)   Resp 20   Ht 5' 8\" (1.727 m)   Wt 115 kg (254 lb)   SpO2 97%   BMI 38.62 kg/m²   No LMP for male patient.       Physical Exam     Physical Exam  Constitutional:       Appearance: Normal appearance.   HENT:      Head: Normocephalic and atraumatic.      Right Ear: Tympanic membrane, ear canal and external ear normal.      Left Ear: Tympanic membrane, ear canal and external ear normal.      Nose: Nose normal. No congestion.      Mouth/Throat:      Mouth: Mucous membranes are moist.      Pharynx: Oropharynx is clear. Posterior oropharyngeal erythema present.   Eyes:      Conjunctiva/sclera: Conjunctivae normal.      Pupils: Pupils are equal, round, and reactive to light.   Cardiovascular:      Rate and Rhythm: Normal rate and regular rhythm.      Pulses: Normal pulses.      Heart sounds: Normal heart sounds.   Pulmonary:      Effort: Pulmonary effort is normal.      " Breath sounds: Normal breath sounds.   Abdominal:      General: Bowel sounds are normal.   Musculoskeletal:         General: Normal range of motion.      Cervical back: Normal range of motion and neck supple.   Skin:     General: Skin is warm.      Capillary Refill: Capillary refill takes less than 2 seconds.      Findings: Erythema, lesion and rash present.   Neurological:      General: No focal deficit present.      Mental Status: He is oriented to person, place, and time.   Psychiatric:         Mood and Affect: Mood normal.         Thought Content: Thought content normal.         Judgment: Judgment normal.

## 2024-11-19 NOTE — PATIENT INSTRUCTIONS
Take the antibiotic as prescribed  Use the Bactroban on your chin  Do not squeeze at the area  Warm compresses to your chin  Follow-up with your PCP

## 2024-11-20 ENCOUNTER — OFFICE VISIT (OUTPATIENT)
Dept: INTERNAL MEDICINE CLINIC | Facility: CLINIC | Age: 74
End: 2024-11-20
Payer: COMMERCIAL

## 2024-11-20 VITALS
TEMPERATURE: 97 F | SYSTOLIC BLOOD PRESSURE: 130 MMHG | BODY MASS INDEX: 38.34 KG/M2 | HEIGHT: 68 IN | DIASTOLIC BLOOD PRESSURE: 72 MMHG | HEART RATE: 68 BPM | RESPIRATION RATE: 18 BRPM | WEIGHT: 253 LBS

## 2024-11-20 DIAGNOSIS — L03.211 FACIAL CELLULITIS: ICD-10-CM

## 2024-11-20 DIAGNOSIS — R73.03 PREDIABETES: ICD-10-CM

## 2024-11-20 DIAGNOSIS — I10 PRIMARY HYPERTENSION: Primary | ICD-10-CM

## 2024-11-20 DIAGNOSIS — R73.09 ELEVATED HEMOGLOBIN A1C: ICD-10-CM

## 2024-11-20 PROCEDURE — 99214 OFFICE O/P EST MOD 30 MIN: CPT | Performed by: INTERNAL MEDICINE

## 2024-11-20 RX ORDER — AMLODIPINE BESYLATE 5 MG/1
5 TABLET ORAL DAILY
Qty: 90 TABLET | Refills: 1 | Status: SHIPPED | OUTPATIENT
Start: 2024-11-20

## 2024-11-20 NOTE — ASSESSMENT & PLAN NOTE
Orders:    amLODIPine (NORVASC) 5 mg tablet; Take 1 tablet (5 mg total) by mouth daily    Comprehensive metabolic panel; Future    CBC and differential; Future    Lipid Panel with Direct LDL reflex; Future

## 2024-11-20 NOTE — PROGRESS NOTES
Name: Fredi Griffin      : 1950      MRN: [id number]  Encounter Provider: Eddie Proctor DO  Encounter Date: 2024   Encounter department: 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  Initially noticed reddening of the chin area around 2 to 3 days ago.  Symptoms worsened and he noticed swelling in the area as well.  Reviewed urgent care note from  -was given prescription for both Keflex and Bactroban.  Has been taking as prescribed for the past day.  Had noticed some drainage at home, no purulence    On exam, he has several centimeter area of raised erythema in the chin area with central scabbing.  2 small satellite lesions.  No drainage or purulence appreciated.  No intraoral lesions    Plan:  -Possible cellulitis.  Impetigo also in the differential  -Recommend continuation with Keflex and topical mupirocin for now  -If symptoms do not improve, advised him to let me know and can consider switching to Bactrim or doxycycline         Primary hypertension    Orders:    amLODIPine (NORVASC) 5 mg tablet; Take 1 tablet (5 mg total) by mouth daily    Comprehensive metabolic panel; Future    CBC and differential; Future    Lipid Panel with Direct LDL reflex; Future    Elevated hemoglobin A1c  Reviewed labs from 2024.  A1c of 7.0, previously 6.8 in 2023.  Discussed that A1c is consistent with diagnosis of type 2 diabetes  -Attention to healthy dietary and lifestyle choices -importance of cutting back on excess carb intake, regular exercise encouraged  We will hold off on starting any additional medications at this time  Recheck labs in 4 months prior to follow-up  Orders:    Hemoglobin A1C; Future    Prediabetes                History of Present Illness     HPI  Review of Systems       Objective   There were no  vitals taken for this visit.     Physical Exam  Skin:     Findings: Erythema and lesion present.

## 2024-11-22 ENCOUNTER — OFFICE VISIT (OUTPATIENT)
Dept: INTERNAL MEDICINE CLINIC | Facility: CLINIC | Age: 74
End: 2024-11-22
Payer: COMMERCIAL

## 2024-11-22 VITALS
WEIGHT: 251 LBS | BODY MASS INDEX: 38.04 KG/M2 | HEART RATE: 68 BPM | DIASTOLIC BLOOD PRESSURE: 66 MMHG | RESPIRATION RATE: 18 BRPM | SYSTOLIC BLOOD PRESSURE: 110 MMHG | OXYGEN SATURATION: 93 % | HEIGHT: 68 IN | TEMPERATURE: 98 F

## 2024-11-22 DIAGNOSIS — L08.9 SKIN INFECTION: ICD-10-CM

## 2024-11-22 DIAGNOSIS — L03.211 FACIAL CELLULITIS: Primary | ICD-10-CM

## 2024-11-22 PROCEDURE — 99212 OFFICE O/P EST SF 10 MIN: CPT | Performed by: INTERNAL MEDICINE

## 2024-11-22 RX ORDER — CEPHALEXIN 500 MG/1
500 CAPSULE ORAL EVERY 6 HOURS SCHEDULED
Qty: 20 CAPSULE | Refills: 0 | Status: SHIPPED | OUTPATIENT
Start: 2024-11-22 | End: 2024-11-27

## 2024-11-22 NOTE — ASSESSMENT & PLAN NOTE
Orders:    cephalexin (KEFLEX) 500 mg capsule; Take 1 capsule (500 mg total) by mouth every 6 (six) hours for 5 days

## 2024-11-22 NOTE — PROGRESS NOTES
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  See progress note for details.  He was advised to continue with Keflex and topical mupirocin previously prescribed at urgent care    Comes in today for reevaluation.  Erythema appears to be improving.  He is still not noticing any discharge or purulence at home.  He remains systemically well-appearing.  He was given 5-day course of Keflex and will be completing shortly    Plan:  -Probable cellulitis, improving.  As this is still not yet resolved, we will treat with another 5 days of Keflex for 10 days total.  Will also continue with topical mupirocin  -Advised him to let me know if symptoms fail to improve         Skin infection    Orders:    cephalexin (KEFLEX) 500 mg capsule; Take 1 capsule (500 mg total) by mouth every 6 (six) hours for 5 days           History of Present Illness     HPI  Review of Systems       Objective   /66 (BP Location: Left arm, Patient Position: Sitting, Cuff Size: Standard)   Pulse 68   Temp 98 °F (36.7 °C) (Tympanic)   Resp 18   Ht 5' 8\" (1.727 m)   Wt 114 kg (251 lb)   SpO2 93%   BMI 38.16 kg/m²      Physical Exam  Skin:     Findings: Erythema and lesion present.         "

## 2025-01-22 DIAGNOSIS — N40.1 BENIGN PROSTATIC HYPERPLASIA WITH URINARY OBSTRUCTION: ICD-10-CM

## 2025-01-22 DIAGNOSIS — N13.8 BENIGN PROSTATIC HYPERPLASIA WITH URINARY OBSTRUCTION: ICD-10-CM

## 2025-01-22 RX ORDER — TAMSULOSIN HYDROCHLORIDE 0.4 MG/1
0.4 CAPSULE ORAL
Qty: 30 CAPSULE | Refills: 0 | Status: SHIPPED | OUTPATIENT
Start: 2025-01-22

## 2025-01-22 NOTE — TELEPHONE ENCOUNTER
Reason for call:   [x] Refill   [] Prior Auth  [x] Other: only wants 30 days supply and wants PCP to order     Office:   [x] PCP/Provider -   [] Specialty/Provider -     Medication:     tamsulosin (FLOMAX) 0.4 mg 1 capsule daily          [facility]     Does the patient have enough for 3 days?   [] Yes   [x] No - Send as HP to POD

## 2025-01-29 NOTE — TELEPHONE ENCOUNTER
Patient unable to void at home after surgery. Had to go to the ER. Was told urology would follow up with him in a week.   Pt called office back said he wanted to report also some issue with his stool. Reports feels like he is having discomfort and not all the way done with bowel movement. Reports now having dark almost tar like stools. Reports has not seen any blood in stool. Pt reports does have history of hemorrhoids. Denies abdominal pain or distention. Pt reports had COVId last week and still not feeling good. Did advise patient best to follow up with PCP regarding these issue just in case not urology related.

## 2025-01-31 ENCOUNTER — OFFICE VISIT (OUTPATIENT)
Dept: INTERNAL MEDICINE CLINIC | Facility: CLINIC | Age: 75
End: 2025-01-31
Payer: COMMERCIAL

## 2025-01-31 VITALS
BODY MASS INDEX: 39.19 KG/M2 | DIASTOLIC BLOOD PRESSURE: 78 MMHG | HEIGHT: 68 IN | TEMPERATURE: 98.4 F | OXYGEN SATURATION: 96 % | WEIGHT: 258.6 LBS | HEART RATE: 65 BPM | SYSTOLIC BLOOD PRESSURE: 130 MMHG

## 2025-01-31 DIAGNOSIS — R05.2 SUBACUTE COUGH: Primary | ICD-10-CM

## 2025-01-31 PROCEDURE — 99213 OFFICE O/P EST LOW 20 MIN: CPT | Performed by: INTERNAL MEDICINE

## 2025-01-31 RX ORDER — ALBUTEROL SULFATE 90 UG/1
2 INHALANT RESPIRATORY (INHALATION) EVERY 6 HOURS PRN
Qty: 8 G | Refills: 0 | Status: SHIPPED | OUTPATIENT
Start: 2025-01-31

## 2025-01-31 RX ORDER — BENZONATATE 200 MG/1
200 CAPSULE ORAL 3 TIMES DAILY PRN
Qty: 30 CAPSULE | Refills: 0 | Status: SHIPPED | OUTPATIENT
Start: 2025-01-31

## 2025-01-31 NOTE — PROGRESS NOTES
"Name: Fredi Griffin      : 1950      MRN: [id number]  Encounter Provider: Eddie Proctor DO  Encounter Date: 2025   Encounter 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  Sometimes dry, sometimes productive of phlegm.  He is afebrile.  Wife reports that he is more short of breath than usual with activity.  He does not report any change in his leg swelling.  Office weight appears to be relatively stable.  Notes some \"whistling\" in his nose in the evening which he believes is positional.  Does not report any wheezing or chest tightness.  Has not been taking any over-the-counter medications    Lungs are clear to auscultation today.  Normal O2 saturation on room air, he is not in any acute distress    Plan:  -Acute/subacute cough.  Suspect probable postinfectious cough  -Recommend treatment with over-the-counter dextromethorphan or dextromethorphan-guaifenesin.  If no improvement, can try Tessalon Perles as needed  -Also sent for as needed albuterol inhaler to the pharmacy for coughing fits or shortness of breath  -Advised him to let me know if symptoms worsen.  There does not appear to be indication for chest x-ray at this time    Orders:  •  albuterol (Ventolin HFA) 90 mcg/act inhaler; Inhale 2 puffs every 6 (six) hours as needed for wheezing  •  benzonatate (TESSALON) 200 MG capsule; Take 1 capsule (200 mg total) by mouth 3 (three) times a day as needed for cough           History of Present Illness   HPI  Review of Systems    Objective   /78 (BP Location: Left arm, Patient Position: Sitting, Cuff Size: Large)   Pulse 65   Temp 98.4 °F (36.9 °C) (Temporal)   Ht 5' 8\" (1.727 m)   Wt 117 kg (258 lb 9.6 oz)   SpO2 96%   BMI 39.32 kg/m²      Physical Exam  HENT:      " Mouth/Throat:      Mouth: Mucous membranes are moist.      Pharynx: No pharyngeal swelling or oropharyngeal exudate.   Cardiovascular:      Rate and Rhythm: Normal rate and regular rhythm.      Heart sounds: No murmur heard.  Pulmonary:      Effort: Pulmonary effort is normal.      Breath sounds: Normal breath sounds. No wheezing or rales.

## 2025-01-31 NOTE — PATIENT INSTRUCTIONS
For over-the-counter cough relief, you can try dextromethorphan (delsym)  or combination guaifenesin-dextromethorphan (Robitussin-DM)     Dextromethorphan is a cough suppressant.  Guaifenesin is an expectorant, which thins out the mucus and allows for easier clearance    You can also consider trying over-the-counter intranasal steroids such as Flonase or Nasacort in combination with one of the nonsedating histamine such as Claritin, Zyrtec or Allegra to address any underlying postnasal drip issue    I would avoid any medications containing decongestants such as phenylephrine or pseudoephedrine

## 2025-02-05 DIAGNOSIS — N40.1 BENIGN PROSTATIC HYPERPLASIA WITH URINARY OBSTRUCTION: ICD-10-CM

## 2025-02-05 DIAGNOSIS — N13.8 BENIGN PROSTATIC HYPERPLASIA WITH URINARY OBSTRUCTION: ICD-10-CM

## 2025-02-05 RX ORDER — FINASTERIDE 5 MG/1
5 TABLET, FILM COATED ORAL DAILY
Qty: 90 TABLET | Refills: 1 | Status: SHIPPED | OUTPATIENT
Start: 2025-02-05

## 2025-02-05 NOTE — TELEPHONE ENCOUNTER
Medication: finasteride    Dose/Frequency: 5mg OD    Quantity: 90    Pharmacy: [facility] [address]    Office:   [x] PCP/Provider -   [] Speciality/Provider -     Does the patient have enough for 3 days?   [] Yes   [x] No - Send as HP to POD

## 2025-02-23 DIAGNOSIS — N13.8 BENIGN PROSTATIC HYPERPLASIA WITH URINARY OBSTRUCTION: ICD-10-CM

## 2025-02-23 DIAGNOSIS — N40.1 BENIGN PROSTATIC HYPERPLASIA WITH URINARY OBSTRUCTION: ICD-10-CM

## 2025-02-24 RX ORDER — TAMSULOSIN HYDROCHLORIDE 0.4 MG/1
0.4 CAPSULE ORAL
Qty: 90 CAPSULE | Refills: 1 | Status: SHIPPED | OUTPATIENT
Start: 2025-02-24

## 2025-02-28 ENCOUNTER — APPOINTMENT (OUTPATIENT)
Dept: LAB | Facility: MEDICAL CENTER | Age: 75
End: 2025-02-28
Payer: MEDICARE

## 2025-02-28 DIAGNOSIS — E11.9 TYPE 2 DIABETES MELLITUS WITHOUT COMPLICATION, WITHOUT LONG-TERM CURRENT USE OF INSULIN (HCC): ICD-10-CM

## 2025-02-28 DIAGNOSIS — R73.09 ELEVATED HEMOGLOBIN A1C: ICD-10-CM

## 2025-02-28 DIAGNOSIS — I10 PRIMARY HYPERTENSION: ICD-10-CM

## 2025-02-28 DIAGNOSIS — I1A.0 RESISTANT HYPERTENSION: ICD-10-CM

## 2025-02-28 DIAGNOSIS — R94.4 DECREASED CALCULATED GFR: ICD-10-CM

## 2025-02-28 LAB
ALBUMIN SERPL BCG-MCNC: 4.3 G/DL (ref 3.5–5)
ALP SERPL-CCNC: 81 U/L (ref 34–104)
ALT SERPL W P-5'-P-CCNC: 24 U/L (ref 7–52)
ANION GAP SERPL CALCULATED.3IONS-SCNC: 10 MMOL/L (ref 4–13)
AST SERPL W P-5'-P-CCNC: 31 U/L (ref 13–39)
BASOPHILS # BLD AUTO: 0.05 THOUSANDS/ÂΜL (ref 0–0.1)
BASOPHILS NFR BLD AUTO: 1 % (ref 0–1)
BILIRUB SERPL-MCNC: 0.74 MG/DL (ref 0.2–1)
BUN SERPL-MCNC: 21 MG/DL (ref 5–25)
CALCIUM SERPL-MCNC: 9.4 MG/DL (ref 8.4–10.2)
CHLORIDE SERPL-SCNC: 98 MMOL/L (ref 96–108)
CHOLEST SERPL-MCNC: 176 MG/DL (ref ?–200)
CO2 SERPL-SCNC: 29 MMOL/L (ref 21–32)
CREAT SERPL-MCNC: 1.06 MG/DL (ref 0.6–1.3)
CREAT UR-MCNC: 136 MG/DL
CREAT UR-MCNC: 136 MG/DL
EOSINOPHIL # BLD AUTO: 0.19 THOUSAND/ÂΜL (ref 0–0.61)
EOSINOPHIL NFR BLD AUTO: 3 % (ref 0–6)
ERYTHROCYTE [DISTWIDTH] IN BLOOD BY AUTOMATED COUNT: 12.6 % (ref 11.6–15.1)
EST. AVERAGE GLUCOSE BLD GHB EST-MCNC: 166 MG/DL
GFR SERPL CREATININE-BSD FRML MDRD: 69 ML/MIN/1.73SQ M
GLUCOSE P FAST SERPL-MCNC: 147 MG/DL (ref 65–99)
HBA1C MFR BLD: 7.4 %
HCT VFR BLD AUTO: 41.5 % (ref 36.5–49.3)
HDLC SERPL-MCNC: 29 MG/DL
HGB BLD-MCNC: 14.2 G/DL (ref 12–17)
IMM GRANULOCYTES # BLD AUTO: 0.02 THOUSAND/UL (ref 0–0.2)
IMM GRANULOCYTES NFR BLD AUTO: 0 % (ref 0–2)
LDLC SERPL DIRECT ASSAY-MCNC: 67 MG/DL (ref 0–100)
LYMPHOCYTES # BLD AUTO: 2.03 THOUSANDS/ÂΜL (ref 0.6–4.47)
LYMPHOCYTES NFR BLD AUTO: 31 % (ref 14–44)
MCH RBC QN AUTO: 31.1 PG (ref 26.8–34.3)
MCHC RBC AUTO-ENTMCNC: 34.2 G/DL (ref 31.4–37.4)
MCV RBC AUTO: 91 FL (ref 82–98)
MICROALBUMIN UR-MCNC: 15.4 MG/L
MICROALBUMIN/CREAT 24H UR: 11 MG/G CREATININE (ref 0–30)
MONOCYTES # BLD AUTO: 0.67 THOUSAND/ÂΜL (ref 0.17–1.22)
MONOCYTES NFR BLD AUTO: 10 % (ref 4–12)
NEUTROPHILS # BLD AUTO: 3.51 THOUSANDS/ÂΜL (ref 1.85–7.62)
NEUTS SEG NFR BLD AUTO: 55 % (ref 43–75)
NRBC BLD AUTO-RTO: 0 /100 WBCS
PHOSPHATE SERPL-MCNC: 2.8 MG/DL (ref 2.3–4.1)
PLATELET # BLD AUTO: 272 THOUSANDS/UL (ref 149–390)
PMV BLD AUTO: 11.3 FL (ref 8.9–12.7)
POTASSIUM SERPL-SCNC: 4.2 MMOL/L (ref 3.5–5.3)
PROT SERPL-MCNC: 7.2 G/DL (ref 6.4–8.4)
PROT UR-MCNC: 7.5 MG/DL
PROT/CREAT UR: 0.1 MG/G{CREAT} (ref 0–0.1)
RBC # BLD AUTO: 4.56 MILLION/UL (ref 3.88–5.62)
SODIUM SERPL-SCNC: 137 MMOL/L (ref 135–147)
TRIGL SERPL-MCNC: 403 MG/DL (ref ?–150)
WBC # BLD AUTO: 6.47 THOUSAND/UL (ref 4.31–10.16)

## 2025-02-28 PROCEDURE — 84156 ASSAY OF PROTEIN URINE: CPT

## 2025-02-28 PROCEDURE — 80053 COMPREHEN METABOLIC PANEL: CPT

## 2025-02-28 PROCEDURE — 80061 LIPID PANEL: CPT

## 2025-02-28 PROCEDURE — 84100 ASSAY OF PHOSPHORUS: CPT

## 2025-02-28 PROCEDURE — 83835 ASSAY OF METANEPHRINES: CPT

## 2025-02-28 PROCEDURE — 82610 CYSTATIN C: CPT

## 2025-02-28 PROCEDURE — 85025 COMPLETE CBC W/AUTO DIFF WBC: CPT

## 2025-02-28 PROCEDURE — 82043 UR ALBUMIN QUANTITATIVE: CPT

## 2025-02-28 PROCEDURE — 83721 ASSAY OF BLOOD LIPOPROTEIN: CPT

## 2025-02-28 PROCEDURE — 82570 ASSAY OF URINE CREATININE: CPT

## 2025-02-28 PROCEDURE — 82088 ASSAY OF ALDOSTERONE: CPT

## 2025-02-28 PROCEDURE — 36415 COLL VENOUS BLD VENIPUNCTURE: CPT

## 2025-02-28 PROCEDURE — 84244 ASSAY OF RENIN: CPT

## 2025-02-28 PROCEDURE — 83036 HEMOGLOBIN GLYCOSYLATED A1C: CPT

## 2025-03-01 LAB
CYSTATIN C SERPL-MCNC: 1.2 MG/L (ref 0.78–1.15)
GFR/BSA.PRED SERPLBLD CYS-BASED-ARV: 58 ML/MIN/1.73

## 2025-03-03 ENCOUNTER — RESULTS FOLLOW-UP (OUTPATIENT)
Dept: INTERNAL MEDICINE CLINIC | Facility: CLINIC | Age: 75
End: 2025-03-03

## 2025-03-04 ENCOUNTER — HOSPITAL ENCOUNTER (OUTPATIENT)
Dept: NON INVASIVE DIAGNOSTICS | Facility: HOSPITAL | Age: 75
Discharge: HOME | End: 2025-03-04
Attending: INTERNAL MEDICINE
Payer: MEDICARE

## 2025-03-04 DIAGNOSIS — I1A.0 RESISTANT HYPERTENSION: ICD-10-CM

## 2025-03-04 LAB
METANEPH FREE SERPL-MCNC: <25 PG/ML (ref 0–88)
NORMETANEPHRINE SERPL-MCNC: 63.3 PG/ML (ref 0–285.2)

## 2025-03-04 PROCEDURE — 93975 VASCULAR STUDY: CPT | Performed by: SURGERY

## 2025-03-04 PROCEDURE — 93975 VASCULAR STUDY: CPT

## 2025-03-05 ENCOUNTER — RESULTS FOLLOW-UP (OUTPATIENT)
Dept: NEPHROLOGY | Facility: CLINIC | Age: 75
End: 2025-03-05

## 2025-03-05 ENCOUNTER — OFFICE VISIT (OUTPATIENT)
Dept: NEPHROLOGY | Facility: CLINIC | Age: 75
End: 2025-03-05
Payer: MEDICARE

## 2025-03-05 VITALS
WEIGHT: 257 LBS | DIASTOLIC BLOOD PRESSURE: 71 MMHG | BODY MASS INDEX: 38.95 KG/M2 | HEART RATE: 57 BPM | SYSTOLIC BLOOD PRESSURE: 120 MMHG | HEIGHT: 68 IN

## 2025-03-05 DIAGNOSIS — N28.1 RENAL CYST: ICD-10-CM

## 2025-03-05 DIAGNOSIS — E66.812 CLASS 2 SEVERE OBESITY DUE TO EXCESS CALORIES WITH SERIOUS COMORBIDITY AND BODY MASS INDEX (BMI) OF 39.0 TO 39.9 IN ADULT (HCC): ICD-10-CM

## 2025-03-05 DIAGNOSIS — I10 ESSENTIAL HYPERTENSION: ICD-10-CM

## 2025-03-05 DIAGNOSIS — Z79.4 TYPE 2 DIABETES MELLITUS WITHOUT COMPLICATION, WITH LONG-TERM CURRENT USE OF INSULIN (HCC): ICD-10-CM

## 2025-03-05 DIAGNOSIS — Z87.438 HISTORY OF BPH: ICD-10-CM

## 2025-03-05 DIAGNOSIS — E11.9 TYPE 2 DIABETES MELLITUS WITHOUT COMPLICATION, WITH LONG-TERM CURRENT USE OF INSULIN (HCC): ICD-10-CM

## 2025-03-05 DIAGNOSIS — R94.4 DECREASED CALCULATED GLOMERULAR FILTRATION RATE (GFR): Primary | ICD-10-CM

## 2025-03-05 DIAGNOSIS — E66.01 CLASS 2 SEVERE OBESITY DUE TO EXCESS CALORIES WITH SERIOUS COMORBIDITY AND BODY MASS INDEX (BMI) OF 39.0 TO 39.9 IN ADULT (HCC): ICD-10-CM

## 2025-03-05 PROCEDURE — 99213 OFFICE O/P EST LOW 20 MIN: CPT | Performed by: INTERNAL MEDICINE

## 2025-03-05 NOTE — PROGRESS NOTES
Name: Fredi Griffin      : 1950      MRN: [id number]  Encounter Provider: Boom Hilton MD  Encounter Date: 3/5/2025   Encounter department: [facility] [place]  :  Assessment & Plan  Decreased calculated glomerular filtration rate (GFR)  - Etiology/risk factors for CKD: Hypertension, diabetes, obesity related hyperfiltration  - Baseline serum creatinine appears to be around 1.0-1.2, most recently 1.06 2025  - Cystatin C 1.20/GFR 58 2025  -Combined Cystatin C/creatinine GFR 68 mL/min  - Proteinuria: UACR/UPCR: No proteinuria/albuminuria as of 2025  - Imaging: Right renal cyst, no hydronephrosis  - Renal function is currently stable  - Discussed risk factor reduction to retard progression of chronic kidney disease  - Discussed intensive blood pressure control as below   - Discussed weight loss with exercise and dietary modifications  - Discussed avoidance of NSAIDs  - Reassurance was provided  - Repeat lab work in 1 year  - Office follow-up in 1 year    # Screening for anemia   - Target Hb: More than 10 g/dL  - Most recent hemoglobin 14.2 g/dL 2025     # Electrolytes/Acid Base status   - No electrolyte or acid-base disturbance  Essential hypertension  - Target Goal: ~ 120/80 per KDIGO guidelines  - Metanephrine/normetanephrine within normal limits  - Aldosterone/renin ratio pending  - Renal artery Doppler: No evidence of significant arterial occlusive disease on either side 2025  - Bilateral lower extremity swelling present most likely chronic venous insufficiency, no evidence of volume overload overall  - Status: Blood pressure currently well controlled and at goal  - Current antihypertensive regimen: Losartan 50 mg twice daily, Toprol-XL 50 mg daily, chlorthalidone 25 mg daily and amlodipine 5 mg daily  - Changes: No changes today  History of BPH  - Cystoscopy 2023: Prostatic enlargement with outflow obstruction and bladder diverticulum  - Current Rx: Finasteride  5 mg daily, tamsulosin 0.4 mg daily  - Ongoing management per urology  Class 2 severe obesity due to excess calories with serious comorbidity and body mass index (BMI) of 39.0 to 39.9 in adult ([facility])  - Discussed importance of weight loss in management of hypertension and prevention of chronic kidney disease and prevention of diabetes  - Patient is motivated to lose weight and verbalized understanding  Renal cyst  - Anechoic midpole cyst measuring 3.4/3.2/3.1 cm  - Right kidney lower pole nonvascular cystic type structure also noted on renal artery Doppler 03/2025 3.4/3.4 cm  Type 2 diabetes mellitus without complication, with long-term current use of insulin ([facility])  - Most recent HbA1c 7.4  - Current Rx: No antidiabetic medications  - Either metformin or SGLT2 inhibitors can be considered for management  - Given early CKD/risk of CKD, SGLT2 inhibitors would be a good option  - Patient has an upcoming appointment with PCP Dr. Proctor      History of Present Illness   HPI  Fredi Griffin is a 74 y.o. male     Since last visit: He has been doing well.  He denies dyspnea.  He has some leg swelling but essentially unchanged from before.    From initial consultation: He has history of hypertension for 40 years, prediabetes and BPH.  He was seen in the hospital in August for acute kidney injury.  He was admitted with hypotension and elevated creatinine.  His admission creatinine was 2.24 that improved with IV fluid hydration and adjustment of antihypertensive regimen.     >> Major risk factors for CKD  - Diabetes: Pre diabetes, not on medications   - Hypertension: Yes for 40 years   - Age >= 55 years: Yes   - Family history of kidney disease: No   - Obesity or metabolic syndrome: Yes      >> Medical history evaluation   - Prior kidney disease or dialysis: JOSE LUIS in 08/2022  - Incidental albuminuria or hematuria in the past: No   - Urinary symptoms: Nocturia 3-4 times, stream is weak   - History of foamy or frothy urine: No   -  History of nephrolithiasis: No   - Diseases that share risk factors with CKD: DM, HTN  - Systemic diseases that might affect kidney: No   - History of use of medications that might affect renal function: No     History obtained from: patient    Review of Systems   Constitutional:  Negative for chills and fever.   HENT:  Negative for ear pain and sore throat.    Eyes:  Negative for pain and visual disturbance.   Respiratory:  Negative for cough and shortness of breath.    Cardiovascular:  Negative for chest pain and palpitations.   Gastrointestinal:  Negative for abdominal pain and vomiting.   Genitourinary:  Negative for dysuria and hematuria.   Musculoskeletal:  Negative for arthralgias and back pain.   Skin:  Negative for color change and rash.   Neurological:  Negative for seizures and syncope.   All other systems reviewed and are negative.    Past Medical History   Past Medical History:   Diagnosis Date    Hearing loss of aging     History of total left knee replacement     HL (hearing loss)     Hx of exposure to tuberculosis     treated in past with meds    Hyperlipidemia     Hypertension     OA (osteoarthritis)     bea  knees    Ocular hypertension of left eye     Tinnitus     Use of cane as ambulatory aid     Wears glasses      Past Surgical History:   Procedure Laterality Date    ADENOIDECTOMY      CATARACT EXTRACTION Bilateral     COLONOSCOPY      JOINT REPLACEMENT Left     knee    AZ ARTHRP KNE CONDYLE&PLATU MEDIAL&LAT COMPARTMENTS Left 7/14/2017    Procedure: ARTHROPLASTY KNEE TOTAL;  Surgeon: Roque Jean MD;  Location: [facility];  Service: Orthopedics    AZ ARTHRP KNE CONDYLE&PLATU MEDIAL&LAT COMPARTMENTS Right 9/5/2017    Procedure: ARTHROPLASTY KNEE TOTAL;  Surgeon: Roque Jean MD;  Location: [facility];  Service: Orthopedics    TONSILLECTOMY      WISDOM TOOTH EXTRACTION       Family History   Problem Relation Age of Onset    Diabetes Mother     COPD Father     Cancer Father         Gastric     "Cancer Paternal Uncle         Gastric    Breast cancer Family     Thyroid cancer Family       reports that he quit smoking about 52 years ago. His smoking use included cigarettes. He has never used smokeless tobacco. He reports that he does not drink alcohol and does not use drugs.  Current Outpatient Medications   Medication Instructions    albuterol (Ventolin HFA) 90 mcg/act inhaler 2 puffs, Inhalation, Every 6 hours PRN    amLODIPine (NORVASC) 5 mg, Oral, Daily    benzonatate (TESSALON) 200 mg, Oral, 3 times daily PRN    chlorthalidone 25 mg, Oral, Daily    dorzolamide-timolol (COSOPT) 22.3-6.8 MG/ML ophthalmic solution 1 drop, 2 times daily    finasteride (PROSCAR) 5 mg, Oral, Daily    losartan (COZAAR) 50 mg, Oral, 2 times daily    metoprolol succinate (TOPROL-XL) 50 mg, Oral, Daily    tamsulosin (FLOMAX) 0.4 mg, Oral, Daily at bedtime    vitamin C 1,000 mg, Daily     Allergies   Allergen Reactions    Celebrex [Celecoxib] GI Intolerance     Nausea - pt able to tolerate with Prevacid    Influenza Vaccines        \"I get the flu or feel horrible for months\"         Objective   /71 (BP Location: Left arm, Patient Position: Sitting, Cuff Size: Large)   Pulse 57   Ht 5' 8\" (1.727 m)   Wt 117 kg (257 lb)   BMI 39.08 kg/m²      Physical Exam  Vitals and nursing note reviewed.   Constitutional:       General: He is not in acute distress.     Appearance: He is well-developed.   HENT:      Head: Normocephalic and atraumatic.   Eyes:      Conjunctiva/sclera: Conjunctivae normal.   Cardiovascular:      Rate and Rhythm: Normal rate and regular rhythm.      Pulses: Normal pulses.      Heart sounds: Normal heart sounds. No murmur heard.  Pulmonary:      Effort: Pulmonary effort is normal. No respiratory distress.      Breath sounds: Normal breath sounds.   Abdominal:      Tenderness: There is no right CVA tenderness or left CVA tenderness.   Musculoskeletal:         General: No swelling.      Cervical back: Neck " supple.      Right lower leg: No edema.      Left lower leg: No edema.   Skin:     General: Skin is warm and dry.      Capillary Refill: Capillary refill takes less than 2 seconds.   Neurological:      Mental Status: He is alert.   Psychiatric:         Mood and Affect: Mood normal.

## 2025-03-05 NOTE — ASSESSMENT & PLAN NOTE
- Cystoscopy 11/2023: Prostatic enlargement with outflow obstruction and bladder diverticulum  - Current Rx: Finasteride 5 mg daily, tamsulosin 0.4 mg daily  - Ongoing management per urology

## 2025-03-05 NOTE — ASSESSMENT & PLAN NOTE
- Etiology/risk factors for CKD: Hypertension, diabetes, obesity related hyperfiltration  - Baseline serum creatinine appears to be around 1.0-1.2, most recently 1.06 02/2025  - Cystatin C 1.20/GFR 58 02/2025  -Combined Cystatin C/creatinine GFR 68 mL/min  - Proteinuria: UACR/UPCR: No proteinuria/albuminuria as of 02/2025  - Imaging: Right renal cyst, no hydronephrosis  - Renal function is currently stable  - Discussed risk factor reduction to retard progression of chronic kidney disease  - Discussed intensive blood pressure control as below   - Discussed weight loss with exercise and dietary modifications  - Discussed avoidance of NSAIDs  - Reassurance was provided  - Repeat lab work in 1 year  - Office follow-up in 1 year    # Screening for anemia   - Target Hb: More than 10 g/dL  - Most recent hemoglobin 14.2 g/dL 02/2025     # Electrolytes/Acid Base status   - No electrolyte or acid-base disturbance

## 2025-03-05 NOTE — PATIENT INSTRUCTIONS
Your kidney function is stable.    There is no protein in the urine which is a good sign.    Your blood pressure is very well-controlled on current regimen.    Repeat lab testing in 1 year to follow-up in office in 1 year.    Send repeat kidney ultrasound in 1 year to follow-up on kidney cyst.

## 2025-03-06 LAB
ALDOST SERPL-MCNC: 9.5 NG/DL (ref 0–30)
ALDOST/RENIN PLAS-RTO: 0.4 {RATIO} (ref 0–30)
RENIN PLAS-CCNC: 26.41 NG/ML/HR (ref 0.17–5.38)

## 2025-03-18 ENCOUNTER — RA CDI HCC (OUTPATIENT)
Dept: OTHER | Facility: HOSPITAL | Age: 75
End: 2025-03-18

## 2025-03-18 NOTE — PROGRESS NOTES
K65.4  HCC coding opportunities          Chart Reviewed number of suggestions sent to Provider: 1     Patients Insurance     Medicare Insurance: Medicare

## 2025-03-20 ENCOUNTER — OFFICE VISIT (OUTPATIENT)
Dept: INTERNAL MEDICINE CLINIC | Facility: CLINIC | Age: 75
End: 2025-03-20
Payer: MEDICARE

## 2025-03-20 VITALS
HEIGHT: 68 IN | DIASTOLIC BLOOD PRESSURE: 80 MMHG | TEMPERATURE: 98.6 F | BODY MASS INDEX: 38.19 KG/M2 | OXYGEN SATURATION: 98 % | SYSTOLIC BLOOD PRESSURE: 122 MMHG | HEART RATE: 55 BPM | WEIGHT: 252 LBS

## 2025-03-20 DIAGNOSIS — N18.2 TYPE 2 DIABETES MELLITUS WITH STAGE 2 CHRONIC KIDNEY DISEASE, WITHOUT LONG-TERM CURRENT USE OF INSULIN  (HCC): ICD-10-CM

## 2025-03-20 DIAGNOSIS — Z00.00 WELCOME TO MEDICARE PREVENTIVE VISIT: ICD-10-CM

## 2025-03-20 DIAGNOSIS — I10 PRIMARY HYPERTENSION: Primary | ICD-10-CM

## 2025-03-20 DIAGNOSIS — Z12.5 SCREENING FOR PROSTATE CANCER: ICD-10-CM

## 2025-03-20 DIAGNOSIS — R79.9 ABNORMAL FINDING OF BLOOD CHEMISTRY, UNSPECIFIED: ICD-10-CM

## 2025-03-20 DIAGNOSIS — E11.22 TYPE 2 DIABETES MELLITUS WITH STAGE 2 CHRONIC KIDNEY DISEASE, WITHOUT LONG-TERM CURRENT USE OF INSULIN  (HCC): ICD-10-CM

## 2025-03-20 PROCEDURE — G2211 COMPLEX E/M VISIT ADD ON: HCPCS | Performed by: INTERNAL MEDICINE

## 2025-03-20 PROCEDURE — G0403 EKG FOR INITIAL PREVENT EXAM: HCPCS | Performed by: INTERNAL MEDICINE

## 2025-03-20 PROCEDURE — 99214 OFFICE O/P EST MOD 30 MIN: CPT | Performed by: INTERNAL MEDICINE

## 2025-03-20 PROCEDURE — G0402 INITIAL PREVENTIVE EXAM: HCPCS | Performed by: INTERNAL MEDICINE

## 2025-03-20 NOTE — ASSESSMENT & PLAN NOTE
HbA1c: 7.4   Current medications: None  Statin: No  Albuminuria/ACEi/ARB: No significant microalbuminuria  Foot exam: Due  Retinopathy: Follows with ophthalmology     New diagnosis, reviewed today.  A1c of 7.4 February 2025, previously 7.0 August 2024.  Discussed importance of trying to maintain A1c at or below 7.0.  Discussed healthy diet and lifestyle, importance of moderation of carbohydrate intake.  Weight loss would also be beneficial  -After discussion, we will defer starting any medications today.  He would be a good candidate for SGLT2i or GLP-1 RA if medication needed.  Will reassess in 4 months at next follow-up    Orders:  •  Lipid Panel with Direct LDL reflex; Future  •  Hemoglobin A1C; Future  •  Basic metabolic panel; Future

## 2025-03-20 NOTE — PROGRESS NOTES
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  Of note, patient on metoprolol succinate  Orders:  •  POCT ECG    Screening for prostate cancer  Bill elects to continue with prostate cancer screening.  Will check PSA with next set of labs  Orders:  •  PSA, Total Screen; Future    Type 2 diabetes mellitus with stage 2 chronic kidney disease, without long-term current use of insulin  (HCC)    HbA1c: 7.4   Current medications: None  Statin: No  Albuminuria/ACEi/ARB: No significant microalbuminuria  Foot exam: Due  Retinopathy: Follows with ophthalmology     New diagnosis, reviewed today.  A1c of 7.2025, previously 7..  Discussed importance of trying to maintain A1c at or below 7.0.  Discussed healthy diet and lifestyle, importance of moderation of carbohydrate intake.  Weight loss would also be beneficial  -After discussion, we will defer starting any medications today.  He would be a good candidate for SGLT2i or GLP-1 RA if medication needed.  Will reassess in 4 months at next follow-up    Orders:  •  Lipid Panel with Direct LDL reflex; Future  •  Hemoglobin A1C; Future  •  Basic metabolic panel; Future       Preventive health issues were discussed with patient, and age appropriate screening tests were ordered as noted in patient's After Visit Summary. Personalized health advice  and appropriate referrals for health education or preventive services given if needed, as noted in patient's After Visit Summary.    History of Present Illness     HPI   Patient Care Team:  Eddie Proctor DO as PCP - General (Internal Medicine)  Boom Hilton MD (Nephrology)    Review of Systems  Medical History Reviewed by provider this encounter:  Tobacco  Allergies  Meds  Problems  Med Hx  Surg Hx  Fam Hx       Annual Wellness Visit Questionnaire   Fredi is here for his Welcome to Medicare visit.     Health Risk Assessment:   Patient rates overall health as good. Patient feels that their physical health rating is same. Patient is very satisfied with their life. Eyesight was rated as same. Hearing was rated as same. Patient feels that their emotional and mental health rating is same. Patients states they are never, rarely angry. Patient states they are sometimes unusually tired/fatigued. Pain experienced in the last 7 days has been none. Patient states that he has experienced no weight loss or gain in last 6 months.     Depression Screening:   PHQ-2 Score: 0      Fall Risk Screening:   In the past year, patient has experienced: no history of falling in past year      Home Safety:  Patient does not have trouble with stairs inside or outside of their home. Patient has working smoke alarms and has working carbon monoxide detector. Home safety hazards include: none.     Nutrition:   Current diet is Regular.     Medications:   Patient is currently taking over-the-counter supplements. OTC medications include: see medication list. Patient is able to manage medications.     Activities of Daily Living (ADLs)/Instrumental Activities of Daily Living (IADLs):   Walk and transfer into and out of bed and chair?: Yes  Dress and groom yourself?: Yes    Bathe or shower yourself?: Yes    Feed yourself? Yes  Do your laundry/housekeeping?: Yes  Manage your money, pay your bills and track your expenses?: Yes  Make  your own meals?: Yes    Do your own shopping?: Yes    Previous Hospitalizations:   Any hospitalizations or ED visits within the last 12 months?: Yes    How many hospitalizations have you had in the last year?: 1-2    Advance Care Planning:   Living will: No    Advanced directive: Yes      Cognitive Screening:   Provider or family/friend/caregiver concerned regarding cognition?: No    PREVENTIVE SCREENINGS      Cardiovascular Screening:    General: Screening Not Indicated and History Lipid Disorder      Diabetes Screening:     General: Screening Not Indicated and History Diabetes      Colorectal Cancer Screening:     General: Screening Current      Prostate Cancer Screening:    General: Risks and Benefits Discussed      Osteoporosis Screening:    General: Screening Not Indicated      Abdominal Aortic Aneurysm (AAA) Screening:    Risk factors include: age between 65-76 yo and tobacco use        Lung Cancer Screening:     General: Screening Not Indicated      Hepatitis C Screening:    General: Screening Current    Screening, Brief Intervention, and Referral to Treatment (SBIRT)     Screening  Typical number of drinks in a day: 0  Typical number of drinks in a week: 0  Interpretation: Low risk drinking behavior.    Single Item Drug Screening:  How often have you used an illegal drug (including marijuana) or a prescription medication for non-medical reasons in the past year? never    Single Item Drug Screen Score: 0  Interpretation: Negative screen for possible drug use disorder    Brief Intervention  Alcohol & drug use screenings were reviewed. No concerns regarding substance use disorder identified.     Social Drivers of Health     Food Insecurity: No Food Insecurity (3/20/2025)    Hunger Vital Sign    • Worried About Running Out of Food in the Last Year: Never true    • Ran Out of Food in the Last Year: Never true   Transportation Needs: No Transportation Needs (3/20/2025)    PRAPARE - Transportation    • Lack of  "Transportation (Medical): No    • Lack of Transportation (Non-Medical): No   Housing Stability: Unknown (3/20/2025)    Housing Stability Vital Sign    • Unable to Pay for Housing in the Last Year: No    • Homeless in the Last Year: No   Utilities: Not At Risk (3/20/2025)    [facility] Utilities    • Threatened with loss of utilities: No     Vision Screening    Right eye Left eye Both eyes   Without correction      With correction 20/25 20/25 20/20       Objective   /80 (BP Location: Left arm, Patient Position: Sitting, Cuff Size: Large)   Pulse 55   Temp 98.6 °F (37 °C) (Temporal)   Ht 5' 8\" (1.727 m)   Wt 114 kg (252 lb)   SpO2 98%   BMI 38.32 kg/m²     Physical Exam  Cardiovascular:      Rate and Rhythm: Normal rate and regular rhythm.      Heart sounds: No murmur heard.  Pulmonary:      Effort: Pulmonary effort is normal.      Breath sounds: Normal breath sounds. No wheezing or rales.         "

## 2025-03-20 NOTE — ASSESSMENT & PLAN NOTE
Orders:  •  Lipid Panel with Direct LDL reflex; Future  •  Hemoglobin A1C; Future  •  Basic metabolic panel; Future

## 2025-03-20 NOTE — PATIENT INSTRUCTIONS
Medicare Preventive Visit Patient Instructions  Thank you for completing your Welcome to Medicare Visit or Medicare Annual Wellness Visit today. Your next wellness visit will be due in one year (3/21/2026).  The screening/preventive services that you may require over the next 5-10 years are detailed below. Some tests may not apply to you based off risk factors and/or age. Screening tests ordered at today's visit but not completed yet may show as past due. Also, please note that scanned in results may not display below.  Preventive Screenings:  Service Recommendations Previous Testing/Comments   Colorectal Cancer Screening  Colonoscopy    Fecal Occult Blood Test (FOBT)/Fecal Immunochemical Test (FIT)  Fecal DNA/Cologuard Test  Flexible Sigmoidoscopy Age: 45-75 years old   Colonoscopy: every 10 years (May be performed more frequently if at higher risk)  OR  FOBT/FIT: every 1 year  OR  Cologuard: every 3 years  OR  Sigmoidoscopy: every 5 years  Screening may be recommended earlier than age 45 if at higher risk for colorectal cancer. Also, an individualized decision between you and your healthcare provider will decide whether screening between the ages of 76-85 would be appropriate. Colonoscopy: 01/12/2022  FOBT/FIT: Not on file  Cologuard: Not on file  Sigmoidoscopy: Not on file          Prostate Cancer Screening Individualized decision between patient and health care provider in men between ages of 55-69   Medicare will cover every 12 months beginning on the day after your 50th birthday PSA: 1.76 ng/mL           Hepatitis C Screening Once for adults born between 1945 and 1965  More frequently in patients at high risk for Hepatitis C Hep C Antibody: 11/27/2019        Diabetes Screening 1-2 times per year if you're at risk for diabetes or have pre-diabetes Fasting glucose: 147 mg/dL (2/28/2025)  A1C: 7.4 % (2/28/2025)      Cholesterol Screening Once every 5 years if you don't have a lipid disorder. May order more often  based on risk factors. Lipid panel: 02/28/2025         Other Preventive Screenings Covered by Medicare:  Abdominal Aortic Aneurysm (AAA) Screening: covered once if your at risk. You're considered to be at risk if you have a family history of AAA or a male between the age of 65-75 who smoking at least 100 cigarettes in your lifetime.  Lung Cancer Screening: covers low dose CT scan once per year if you meet all of the following conditions: (1) Age 55-77; (2) No signs or symptoms of lung cancer; (3) Current smoker or have quit smoking within the last 15 years; (4) You have a tobacco smoking history of at least 20 pack years (packs per day x number of years you smoked); (5) You get a written order from a healthcare provider.  Glaucoma Screening: covered annually if you're considered high risk: (1) You have diabetes OR (2) Family history of glaucoma OR (3)  aged 50 and older OR (4)  American aged 65 and older  Osteoporosis Screening: covered every 2 years if you meet one of the following conditions: (1) Have a vertebral abnormality; (2) On glucocorticoid therapy for more than 3 months; (3) Have primary hyperparathyroidism; (4) On osteoporosis medications and need to assess response to drug therapy.  HIV Screening: covered annually if you're between the age of 15-65. Also covered annually if you are younger than 15 and older than 65 with risk factors for HIV infection. For pregnant patients, it is covered up to 3 times per pregnancy.    Immunizations:  Immunization Recommendations   Influenza Vaccine Annual influenza vaccination during flu season is recommended for all persons aged >= 6 months who do not have contraindications   Pneumococcal Vaccine   * Pneumococcal conjugate vaccine = PCV13 (Prevnar 13), PCV15 (Vaxneuvance), PCV20 (Prevnar 20)  * Pneumococcal polysaccharide vaccine = PPSV23 (Pneumovax) Adults 19-65 yo with certain risk factors or if 65+ yo  If never received any pneumonia vaccine:  recommend Prevnar 20 (PCV20)  Give PCV20 if previously received 1 dose of PCV13 or PPSV23   Hepatitis B Vaccine 3 dose series if at intermediate or high risk (ex: diabetes, end stage renal disease, liver disease)   Respiratory syncytial virus (RSV) Vaccine - COVERED BY MEDICARE PART D  * RSVPreF3 (Arexvy) CDC recommends that adults 60 years of age and older may receive a single dose of RSV vaccine using shared clinical decision-making (SCDM)   Tetanus (Td) Vaccine - COST NOT COVERED BY MEDICARE PART B Following completion of primary series, a booster dose should be given every 10 years to maintain immunity against tetanus. Td may also be given as tetanus wound prophylaxis.   Tdap Vaccine - COST NOT COVERED BY MEDICARE PART B Recommended at least once for all adults. For pregnant patients, recommended with each pregnancy.   Shingles Vaccine (Shingrix) - COST NOT COVERED BY MEDICARE PART B  2 shot series recommended in those 19 years and older who have or will have weakened immune systems or those 50 years and older     Health Maintenance Due:      Topic Date Due   • Colorectal Cancer Screening  01/10/2029   • Hepatitis C Screening  Completed     Immunizations Due:      Topic Date Due   • Influenza Vaccine (1) Never done     Advance Directives   What are advance directives?  Advance directives are legal documents that state your wishes and plans for medical care. These plans are made ahead of time in case you lose your ability to make decisions for yourself. Advance directives can apply to any medical decision, such as the treatments you want, and if you want to donate organs.   What are the types of advance directives?  There are many types of advance directives, and each state has rules about how to use them. You may choose a combination of any of the following:  Living will:  This is a written record of the treatment you want. You can also choose which treatments you do not want, which to limit, and which to stop  at a certain time. This includes surgery, medicine, IV fluid, and tube feedings.   Durable power of  for healthcare (DPAHC):  This is a written record that states who you want to make healthcare choices for you when you are unable to make them for yourself. This person, called a proxy, is usually a family member or a friend. You may choose more than 1 proxy.  Do not resuscitate (DNR) order:  A DNR order is used in case your heart stops beating or you stop breathing. It is a request not to have certain forms of treatment, such as CPR. A DNR order may be included in other types of advance directives.  Medical directive:  This covers the care that you want if you are in a coma, near death, or unable to make decisions for yourself. You can list the treatments you want for each condition. Treatment may include pain medicine, surgery, blood transfusions, dialysis, IV or tube feedings, and a ventilator (breathing machine).  Values history:  This document has questions about your views, beliefs, and how you feel and think about life. This information can help others choose the care that you would choose.  Why are advance directives important?  An advance directive helps you control your care. Although spoken wishes may be used, it is better to have your wishes written down. Spoken wishes can be misunderstood, or not followed. Treatments may be given even if you do not want them. An advance directive may make it easier for your family to make difficult choices about your care.   Weight Management   Why it is important to manage your weight:  Being overweight increases your risk of health conditions such as heart disease, high blood pressure, type 2 diabetes, and certain types of cancer. It can also increase your risk for osteoarthritis, sleep apnea, and other respiratory problems. Aim for a slow, steady weight loss. Even a small amount of weight loss can lower your risk of health problems.  How to lose weight safely:   A safe and healthy way to lose weight is to eat fewer calories and get regular exercise. You can lose up about 1 pound a week by decreasing the number of calories you eat by 500 calories each day.   Healthy meal plan for weight management:  A healthy meal plan includes a variety of foods, contains fewer calories, and helps you stay healthy. A healthy meal plan includes the following:  Eat whole-grain foods more often.  A healthy meal plan should contain fiber. Fiber is the part of grains, fruits, and vegetables that is not broken down by your body. Whole-grain foods are healthy and provide extra fiber in your diet. Some examples of whole-grain foods are whole-wheat breads and pastas, oatmeal, brown rice, and bulgur.  Eat a variety of vegetables every day.  Include dark, leafy greens such as spinach, kale, landy greens, and mustard greens. Eat yellow and orange vegetables such as carrots, sweet potatoes, and winter squash.   Eat a variety of fruits every day.  Choose fresh or canned fruit (canned in its own juice or light syrup) instead of juice. Fruit juice has very little or no fiber.  Eat low-fat dairy foods.  Drink fat-free (skim) milk or 1% milk. Eat fat-free yogurt and low-fat cottage cheese. Try low-fat cheeses such as mozzarella and other reduced-fat cheeses.  Choose meat and other protein foods that are low in fat.  Choose beans or other legumes such as split peas or lentils. Choose fish, skinless poultry (chicken or turkey), or lean cuts of red meat (beef or pork). Before you cook meat or poultry, cut off any visible fat.   Use less fat and oil.  Try baking foods instead of frying them. Add less fat, such as margarine, sour cream, regular salad dressing and mayonnaise to foods. Eat fewer high-fat foods. Some examples of high-fat foods include french fries, doughnuts, ice cream, and cakes.  Eat fewer sweets.  Limit foods and drinks that are high in sugar. This includes candy, cookies, regular soda, and  sweetened drinks.  Exercise:  Exercise at least 30 minutes per day on most days of the week. Some examples of exercise include walking, biking, dancing, and swimming. You can also fit in more physical activity by taking the stairs instead of the elevator or parking farther away from stores. Ask your healthcare provider about the best exercise plan for you.      © Copyright [employer] 2018 Information is for End User's use only and may not be sold, redistributed or otherwise used for commercial purposes. All illustrations and images included in CareNotes® are the copyrighted property of [employer] or [employer]

## 2025-03-24 DIAGNOSIS — N18.2 STAGE 2 CHRONIC KIDNEY DISEASE: ICD-10-CM

## 2025-03-24 RX ORDER — METOPROLOL SUCCINATE 50 MG/1
50 TABLET, EXTENDED RELEASE ORAL DAILY
Qty: 90 TABLET | Refills: 1 | Status: SHIPPED | OUTPATIENT
Start: 2025-03-24

## 2025-04-15 LAB
LEFT EYE DIABETIC RETINOPATHY: NORMAL
RIGHT EYE DIABETIC RETINOPATHY: NORMAL

## 2025-05-15 DIAGNOSIS — N18.2 STAGE 2 CHRONIC KIDNEY DISEASE: ICD-10-CM

## 2025-05-15 DIAGNOSIS — I10 PRIMARY HYPERTENSION: ICD-10-CM

## 2025-05-16 RX ORDER — AMLODIPINE BESYLATE 5 MG/1
5 TABLET ORAL DAILY
Qty: 90 TABLET | Refills: 1 | Status: SHIPPED | OUTPATIENT
Start: 2025-05-16

## 2025-05-16 RX ORDER — CHLORTHALIDONE 25 MG/1
25 TABLET ORAL DAILY
Qty: 90 TABLET | Refills: 1 | Status: SHIPPED | OUTPATIENT
Start: 2025-05-16

## 2025-05-21 DIAGNOSIS — N40.1 BENIGN PROSTATIC HYPERPLASIA WITH URINARY OBSTRUCTION: ICD-10-CM

## 2025-05-21 DIAGNOSIS — N13.8 BENIGN PROSTATIC HYPERPLASIA WITH URINARY OBSTRUCTION: ICD-10-CM

## 2025-05-21 RX ORDER — FINASTERIDE 5 MG/1
5 TABLET, FILM COATED ORAL DAILY
Qty: 90 TABLET | Refills: 1 | Status: SHIPPED | OUTPATIENT
Start: 2025-05-21

## 2025-07-11 ENCOUNTER — APPOINTMENT (OUTPATIENT)
Dept: LAB | Facility: MEDICAL CENTER | Age: 75
End: 2025-07-11
Payer: MEDICARE

## 2025-07-11 DIAGNOSIS — N18.2 TYPE 2 DIABETES MELLITUS WITH STAGE 2 CHRONIC KIDNEY DISEASE, WITHOUT LONG-TERM CURRENT USE OF INSULIN  (HCC): ICD-10-CM

## 2025-07-11 DIAGNOSIS — E11.22 TYPE 2 DIABETES MELLITUS WITH STAGE 2 CHRONIC KIDNEY DISEASE, WITHOUT LONG-TERM CURRENT USE OF INSULIN  (HCC): ICD-10-CM

## 2025-07-11 DIAGNOSIS — R79.9 ABNORMAL FINDING OF BLOOD CHEMISTRY, UNSPECIFIED: ICD-10-CM

## 2025-07-11 DIAGNOSIS — Z12.5 SCREENING FOR PROSTATE CANCER: ICD-10-CM

## 2025-07-11 LAB
ANION GAP SERPL CALCULATED.3IONS-SCNC: 9 MMOL/L (ref 4–13)
BUN SERPL-MCNC: 18 MG/DL (ref 5–25)
CALCIUM SERPL-MCNC: 9.2 MG/DL (ref 8.4–10.2)
CHLORIDE SERPL-SCNC: 102 MMOL/L (ref 96–108)
CHOLEST SERPL-MCNC: 136 MG/DL (ref ?–200)
CO2 SERPL-SCNC: 29 MMOL/L (ref 21–32)
CREAT SERPL-MCNC: 1.02 MG/DL (ref 0.6–1.3)
EST. AVERAGE GLUCOSE BLD GHB EST-MCNC: 157 MG/DL
GFR SERPL CREATININE-BSD FRML MDRD: 72 ML/MIN/1.73SQ M
GLUCOSE P FAST SERPL-MCNC: 135 MG/DL (ref 65–99)
HBA1C MFR BLD: 7.1 %
HDLC SERPL-MCNC: 29 MG/DL
LDLC SERPL CALC-MCNC: 65 MG/DL (ref 0–100)
POTASSIUM SERPL-SCNC: 4 MMOL/L (ref 3.5–5.3)
PSA SERPL-MCNC: 0.88 NG/ML (ref 0–4)
SODIUM SERPL-SCNC: 140 MMOL/L (ref 135–147)
TRIGL SERPL-MCNC: 210 MG/DL (ref ?–150)

## 2025-07-11 PROCEDURE — 80048 BASIC METABOLIC PNL TOTAL CA: CPT

## 2025-07-11 PROCEDURE — 80061 LIPID PANEL: CPT

## 2025-07-11 PROCEDURE — G0103 PSA SCREENING: HCPCS

## 2025-07-11 PROCEDURE — 83036 HEMOGLOBIN GLYCOSYLATED A1C: CPT

## 2025-07-11 PROCEDURE — 36415 COLL VENOUS BLD VENIPUNCTURE: CPT

## 2025-07-21 ENCOUNTER — OFFICE VISIT (OUTPATIENT)
Dept: INTERNAL MEDICINE CLINIC | Facility: CLINIC | Age: 75
End: 2025-07-21
Payer: MEDICARE

## 2025-07-21 VITALS
DIASTOLIC BLOOD PRESSURE: 62 MMHG | SYSTOLIC BLOOD PRESSURE: 110 MMHG | WEIGHT: 234 LBS | HEART RATE: 71 BPM | OXYGEN SATURATION: 96 % | TEMPERATURE: 96.6 F | BODY MASS INDEX: 35.46 KG/M2 | RESPIRATION RATE: 18 BRPM | HEIGHT: 68 IN

## 2025-07-21 DIAGNOSIS — E11.22 TYPE 2 DIABETES MELLITUS WITH STAGE 2 CHRONIC KIDNEY DISEASE, WITHOUT LONG-TERM CURRENT USE OF INSULIN  (HCC): ICD-10-CM

## 2025-07-21 DIAGNOSIS — N18.2 TYPE 2 DIABETES MELLITUS WITH STAGE 2 CHRONIC KIDNEY DISEASE, WITHOUT LONG-TERM CURRENT USE OF INSULIN  (HCC): ICD-10-CM

## 2025-07-21 DIAGNOSIS — N40.1 BENIGN PROSTATIC HYPERPLASIA WITH URINARY OBSTRUCTION: ICD-10-CM

## 2025-07-21 DIAGNOSIS — I10 PRIMARY HYPERTENSION: Primary | ICD-10-CM

## 2025-07-21 DIAGNOSIS — N13.8 BENIGN PROSTATIC HYPERPLASIA WITH URINARY OBSTRUCTION: ICD-10-CM

## 2025-07-21 DIAGNOSIS — R22.41 LUMP OF SKIN OF RIGHT LOWER EXTREMITY: ICD-10-CM

## 2025-07-21 PROBLEM — U07.1 COVID-19 VIRUS INFECTION: Status: RESOLVED | Noted: 2020-12-01 | Resolved: 2025-07-21

## 2025-07-21 PROBLEM — D64.9 NORMOCYTIC ANEMIA: Status: RESOLVED | Noted: 2022-08-08 | Resolved: 2025-07-21

## 2025-07-21 PROBLEM — R42 LIGHTHEADEDNESS: Status: RESOLVED | Noted: 2022-08-08 | Resolved: 2025-07-21

## 2025-07-21 PROBLEM — N17.9 AKI (ACUTE KIDNEY INJURY) (HCC): Status: RESOLVED | Noted: 2022-08-08 | Resolved: 2025-07-21

## 2025-07-21 PROBLEM — R10.31 RIGHT INGUINAL PAIN: Status: RESOLVED | Noted: 2021-06-15 | Resolved: 2025-07-21

## 2025-07-21 PROCEDURE — 99214 OFFICE O/P EST MOD 30 MIN: CPT | Performed by: INTERNAL MEDICINE

## 2025-07-21 PROCEDURE — G2211 COMPLEX E/M VISIT ADD ON: HCPCS | Performed by: INTERNAL MEDICINE

## 2025-07-21 NOTE — ASSESSMENT & PLAN NOTE
A1c 7.1 on recent labs July 2025, previously 7.4 February 2025    Current medications: None  Statin: No  Albuminuria/ACEi/ARB: No significant microalbuminuria  Retinopathy: Follows with ophthalmology     A1c trend improving.  He is successfully lost approximately 20+ pounds dating back to earlier this year.  He is not particularly interested in injectable therapy but would be open to oral medication, would be a good candidate for SGLT2i.  After discussion, we will defer any medication for now.  Will reassess in 4 months    Orders:  •  Hemoglobin A1C; Future  •  Basic metabolic panel; Future  •  Lipid Panel with Direct LDL reflex; Future
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
## 2025-07-21 NOTE — PROGRESS NOTES
Diabetic Foot Exam    Patient's shoes and socks removed.    Right Foot/Ankle   Right Foot Inspection  Skin Exam: skin normal, skin intact and dry skin. No warmth, no callus, no erythema, no maceration, no abnormal color, no pre-ulcer, no ulcer and no callus.     Toe Exam: ROM and strength within normal limits.     Sensory   Vibration: diminished  Monofilament testing: diminished    Vascular  The right DP pulse is 2+. The right PT pulse is 2+.     Left Foot/Ankle  Left Foot Inspection  Skin Exam: skin normal, skin intact and dry skin. No warmth, no erythema, no maceration, normal color, no pre-ulcer, no ulcer and no callus.     Toe Exam: ROM and strength within normal limits.     Sensory   Vibration: absent  Monofilament testing: diminished    Vascular  The left DP pulse is 2+. The left PT pulse is 2+.     Assign Risk Category  No deformity present  No loss of protective sensation  No weak pulses  Risk: 0

## 2025-07-24 ENCOUNTER — HOSPITAL ENCOUNTER (OUTPATIENT)
Dept: ULTRASOUND IMAGING | Facility: MEDICAL CENTER | Age: 75
Discharge: HOME | End: 2025-07-24
Attending: INTERNAL MEDICINE
Payer: MEDICARE

## 2025-07-24 DIAGNOSIS — R22.41 LUMP OF SKIN OF RIGHT LOWER EXTREMITY: ICD-10-CM

## 2025-07-24 PROCEDURE — 76882 US LMTD JT/FCL EVL NVASC XTR: CPT

## 2025-08-03 DIAGNOSIS — N13.8 BENIGN PROSTATIC HYPERPLASIA WITH URINARY OBSTRUCTION: ICD-10-CM

## 2025-08-03 DIAGNOSIS — N40.1 BENIGN PROSTATIC HYPERPLASIA WITH URINARY OBSTRUCTION: ICD-10-CM

## 2025-08-05 RX ORDER — TAMSULOSIN HYDROCHLORIDE 0.4 MG/1
0.4 CAPSULE ORAL
Qty: 90 CAPSULE | Refills: 1 | Status: SHIPPED | OUTPATIENT
Start: 2025-08-05

## 2025-08-20 DIAGNOSIS — I1A.0 RESISTANT HYPERTENSION: ICD-10-CM

## 2025-08-21 RX ORDER — LOSARTAN POTASSIUM 50 MG/1
50 TABLET ORAL 2 TIMES DAILY
Qty: 180 TABLET | Refills: 1 | Status: SHIPPED | OUTPATIENT
Start: 2025-08-21

## (undated) DEVICE — 3000CC GUARDIAN II: Brand: GUARDIAN

## (undated) DEVICE — CEMENT MIXING CARTRIDGE PRISM II

## (undated) DEVICE — DRESSING MEPILEX AG BORDER 4 X 12 IN

## (undated) DEVICE — STOCKINETTE REGULAR

## (undated) DEVICE — TISSEEL FIBRIN 4ML FROZEN

## (undated) DEVICE — IMPERVIOUS STOCKINETTE: Brand: DEROYAL

## (undated) DEVICE — PADDING CAST 6IN COTTON STRL

## (undated) DEVICE — 3M™ DURAPORE™ SURGICAL TAPE 1538-3, 3 INCH X 10 YARD (7,5CM X 9,1M), 4 ROLLS/BOX: Brand: 3M™ DURAPORE™

## (undated) DEVICE — GLOVE INDICATOR PI UNDERGLOVE SZ 7 BLUE

## (undated) DEVICE — SPONGE LAP 18 X 18 IN

## (undated) DEVICE — ANTIBACTERIAL UNDYED BRAIDED (POLYGLACTIN 910), SYNTHETIC ABSORBABLE SUTURE: Brand: COATED VICRYL

## (undated) DEVICE — PROXIMATE SKIN STAPLERS (35 WIDE) CONTAINS 35 STAINLESS STEEL STAPLES (FIXED HEAD): Brand: PROXIMATE

## (undated) DEVICE — GLOVE SRG BIOGEL 8.5

## (undated) DEVICE — DRESSING MEPILEX BORDER 4 X 12 IN

## (undated) DEVICE — CAPIT KNEE ATTUNE FB CEMENT - DEPUY

## (undated) DEVICE — SUT STRATAFIX SPIRAL PDS PLUS 1 CTX 18 IN SXPP1A400

## (undated) DEVICE — SUT FIBERWIRE #2 1/2 CIRCLE T-5 38IN AR-7200

## (undated) DEVICE — WEBRIL 6 IN UNSTERILE

## (undated) DEVICE — TRAY FOLEY 16FR URIMETER SURESTEP

## (undated) DEVICE — GLOVE SRG BIOGEL 7

## (undated) DEVICE — NEEDLE 22 G X 1 1/2 SAFETY

## (undated) DEVICE — SAW BLADE RECIPROCATING 179

## (undated) DEVICE — GLOVE INDICATOR PI UNDERGLOVE SZ 8 BLUE

## (undated) DEVICE — JP 3-SPRING RES W/10FR PVC DRAIN/TR: Brand: CARDINAL HEALTH

## (undated) DEVICE — ADHESIVE SKN CLSR HISTOACRYL FLEX 0.5ML LF

## (undated) DEVICE — SURGICAL GOWN, XL SMARTSLEEVE: Brand: CONVERTORS

## (undated) DEVICE — PACK TOTAL KNEE PBDS

## (undated) DEVICE — SCD SEQUENTIAL COMPRESSION COMFORT SLEEVE MEDIUM KNEE LENGTH: Brand: KENDALL SCD

## (undated) DEVICE — HOOD: Brand: FLYTE

## (undated) DEVICE — INTENDED FOR TISSUE SEPARATION, AND OTHER PROCEDURES THAT REQUIRE A SHARP SURGICAL BLADE TO PUNCTURE OR CUT.: Brand: BARD-PARKER SAFETY BLADES SIZE 10, STERILE

## (undated) DEVICE — THE SIMPULSE SOLO SYSTEM WITH ULTREX RETRACTABLE SPLASH SHIELD TIP: Brand: SIMPULSE SOLO

## (undated) DEVICE — SYRINGE 20ML LL

## (undated) DEVICE — 3M™ IOBAN™ 2 ANTIMICROBIAL INCISE DRAPE 6648EZ: Brand: IOBAN™ 2

## (undated) DEVICE — SKIN MARKER DUAL TIP WITH RULER CAP, FLEXIBLE RULER AND LABELS: Brand: DEVON

## (undated) DEVICE — ABDOMINAL PAD: Brand: DERMACEA

## (undated) DEVICE — CUFF TOURNIQUET 30 X 4 IN QUICK CONNECT DISP 1BLA

## (undated) DEVICE — SUT VICRYL PLUS 1 CTX 36 IN VCP371H

## (undated) DEVICE — SAW BLADE OSCILLATING BRAZOL 167

## (undated) DEVICE — 3M™ TEGADERM™ TRANSPARENT FILM DRESSING FRAME STYLE, 1624W, 2-3/8 IN X 2-3/4 IN (6 CM X 7 CM), 100/CT 4CT/CASE: Brand: 3M™ TEGADERM™

## (undated) DEVICE — CHLORAPREP HI-LITE 26ML ORANGE

## (undated) DEVICE — COBAN 6 IN STERILE

## (undated) DEVICE — COBAN 4 IN STERILE

## (undated) DEVICE — TWIST DRILL 3.2MM DIA 127.0MM LONG

## (undated) DEVICE — COOL TEMP PAD

## (undated) DEVICE — TIBURON SPLIT SHEET: Brand: CONVERTORS

## (undated) DEVICE — ACE WRAP 6 IN UNSTERILE

## (undated) DEVICE — HOOD: Brand: FLYTE, SURGICOOL